# Patient Record
Sex: MALE | Race: BLACK OR AFRICAN AMERICAN | Employment: OTHER | URBAN - METROPOLITAN AREA
[De-identification: names, ages, dates, MRNs, and addresses within clinical notes are randomized per-mention and may not be internally consistent; named-entity substitution may affect disease eponyms.]

---

## 2022-07-08 ENCOUNTER — HOSPITAL ENCOUNTER (OUTPATIENT)
Dept: CT IMAGING | Age: 82
Discharge: HOME | End: 2022-07-08
Payer: COMMERCIAL

## 2022-07-08 DIAGNOSIS — R19.05 ABDOMINAL SWELLING, PERIUMBILICAL REGION: ICD-10-CM

## 2022-07-08 DIAGNOSIS — C34.32 MALIGNANT NEOPLASM OF LOWER LOBE BRONCHUS, LEFT (HCC): ICD-10-CM

## 2022-07-08 PROCEDURE — 71250 CT THORAX DX C-: CPT

## 2022-08-02 ENCOUNTER — APPOINTMENT (OUTPATIENT)
Dept: GENERAL RADIOLOGY | Age: 82
End: 2022-08-02
Payer: MEDICARE

## 2022-08-02 ENCOUNTER — HOSPITAL ENCOUNTER (OUTPATIENT)
Age: 82
Setting detail: OBSERVATION
Discharge: NURSING, REHAB OR LONG-TERM CARE | End: 2022-08-04
Attending: EMERGENCY MEDICINE
Payer: MEDICARE

## 2022-08-02 DIAGNOSIS — R07.9 CHEST PAIN, UNSPECIFIED TYPE: Primary | ICD-10-CM

## 2022-08-02 DIAGNOSIS — R07.2 PRECORDIAL PAIN: ICD-10-CM

## 2022-08-02 LAB
A/G RATIO: 1.2 (ref 1.1–2.2)
ALBUMIN SERPL-MCNC: 4 G/DL (ref 3.4–5)
ALP BLD-CCNC: 132 U/L (ref 40–129)
ALT SERPL-CCNC: 21 U/L (ref 10–40)
ANION GAP SERPL CALCULATED.3IONS-SCNC: 4 MMOL/L (ref 3–16)
AST SERPL-CCNC: 27 U/L (ref 15–37)
BASOPHILS ABSOLUTE: 0.1 K/UL (ref 0–0.2)
BASOPHILS RELATIVE PERCENT: 1.3 %
BILIRUB SERPL-MCNC: 0.7 MG/DL (ref 0–1)
BUN BLDV-MCNC: 12 MG/DL (ref 7–20)
CALCIUM SERPL-MCNC: 9.2 MG/DL (ref 8.3–10.6)
CHLORIDE BLD-SCNC: 101 MMOL/L (ref 99–110)
CHOLESTEROL, TOTAL: 118 MG/DL (ref 0–199)
CO2: 34 MMOL/L (ref 21–32)
CREAT SERPL-MCNC: 1.2 MG/DL (ref 0.8–1.3)
D DIMER: <0.27 UG/ML FEU (ref 0–0.6)
EOSINOPHILS ABSOLUTE: 0.2 K/UL (ref 0–0.6)
EOSINOPHILS RELATIVE PERCENT: 2.2 %
GFR AFRICAN AMERICAN: >60
GFR NON-AFRICAN AMERICAN: 58
GLUCOSE BLD-MCNC: 90 MG/DL (ref 70–99)
HCT VFR BLD CALC: 41.3 % (ref 40.5–52.5)
HDLC SERPL-MCNC: 50 MG/DL (ref 40–60)
HEMOGLOBIN: 14 G/DL (ref 13.5–17.5)
LDL CHOLESTEROL CALCULATED: 57 MG/DL
LYMPHOCYTES ABSOLUTE: 2.8 K/UL (ref 1–5.1)
LYMPHOCYTES RELATIVE PERCENT: 30.5 %
MCH RBC QN AUTO: 31.4 PG (ref 26–34)
MCHC RBC AUTO-ENTMCNC: 33.9 G/DL (ref 31–36)
MCV RBC AUTO: 92.7 FL (ref 80–100)
MONOCYTES ABSOLUTE: 1.1 K/UL (ref 0–1.3)
MONOCYTES RELATIVE PERCENT: 11.8 %
NEUTROPHILS ABSOLUTE: 4.9 K/UL (ref 1.7–7.7)
NEUTROPHILS RELATIVE PERCENT: 54.2 %
PDW BLD-RTO: 12.6 % (ref 12.4–15.4)
PLATELET # BLD: 249 K/UL (ref 135–450)
PMV BLD AUTO: 9.2 FL (ref 5–10.5)
POTASSIUM REFLEX MAGNESIUM: 4.2 MMOL/L (ref 3.5–5.1)
PRO-BNP: 208 PG/ML (ref 0–449)
RBC # BLD: 4.46 M/UL (ref 4.2–5.9)
SODIUM BLD-SCNC: 139 MMOL/L (ref 136–145)
TOTAL PROTEIN: 7.4 G/DL (ref 6.4–8.2)
TRIGL SERPL-MCNC: 55 MG/DL (ref 0–150)
TROPONIN: <0.01 NG/ML
TROPONIN: <0.01 NG/ML
VLDLC SERPL CALC-MCNC: 11 MG/DL
WBC # BLD: 9 K/UL (ref 4–11)

## 2022-08-02 PROCEDURE — 80061 LIPID PANEL: CPT

## 2022-08-02 PROCEDURE — 83880 ASSAY OF NATRIURETIC PEPTIDE: CPT

## 2022-08-02 PROCEDURE — G0378 HOSPITAL OBSERVATION PER HR: HCPCS

## 2022-08-02 PROCEDURE — 80053 COMPREHEN METABOLIC PANEL: CPT

## 2022-08-02 PROCEDURE — 6360000002 HC RX W HCPCS: Performed by: INTERNAL MEDICINE

## 2022-08-02 PROCEDURE — 36415 COLL VENOUS BLD VENIPUNCTURE: CPT

## 2022-08-02 PROCEDURE — 99285 EMERGENCY DEPT VISIT HI MDM: CPT

## 2022-08-02 PROCEDURE — 85025 COMPLETE CBC W/AUTO DIFF WBC: CPT

## 2022-08-02 PROCEDURE — 71045 X-RAY EXAM CHEST 1 VIEW: CPT

## 2022-08-02 PROCEDURE — 96372 THER/PROPH/DIAG INJ SC/IM: CPT

## 2022-08-02 PROCEDURE — 93005 ELECTROCARDIOGRAM TRACING: CPT | Performed by: EMERGENCY MEDICINE

## 2022-08-02 PROCEDURE — 6370000000 HC RX 637 (ALT 250 FOR IP): Performed by: INTERNAL MEDICINE

## 2022-08-02 PROCEDURE — 84484 ASSAY OF TROPONIN QUANT: CPT

## 2022-08-02 PROCEDURE — 85379 FIBRIN DEGRADATION QUANT: CPT

## 2022-08-02 RX ORDER — FAMOTIDINE 20 MG/1
20 TABLET, FILM COATED ORAL NIGHTLY PRN
COMMUNITY

## 2022-08-02 RX ORDER — ASPIRIN 81 MG/1
81 TABLET, CHEWABLE ORAL DAILY
Status: DISCONTINUED | OUTPATIENT
Start: 2022-08-02 | End: 2022-08-04 | Stop reason: HOSPADM

## 2022-08-02 RX ORDER — DONEPEZIL HYDROCHLORIDE 5 MG/1
10 TABLET, FILM COATED ORAL NIGHTLY
Status: DISCONTINUED | OUTPATIENT
Start: 2022-08-02 | End: 2022-08-04 | Stop reason: HOSPADM

## 2022-08-02 RX ORDER — TAMSULOSIN HYDROCHLORIDE 0.4 MG/1
0.4 CAPSULE ORAL DAILY
COMMUNITY

## 2022-08-02 RX ORDER — NITROGLYCERIN 0.4 MG/1
0.4 TABLET SUBLINGUAL EVERY 5 MIN PRN
Status: DISCONTINUED | OUTPATIENT
Start: 2022-08-02 | End: 2022-08-04 | Stop reason: HOSPADM

## 2022-08-02 RX ORDER — FAMOTIDINE 20 MG/1
20 TABLET, FILM COATED ORAL DAILY
Status: DISCONTINUED | OUTPATIENT
Start: 2022-08-02 | End: 2022-08-04 | Stop reason: HOSPADM

## 2022-08-02 RX ORDER — ASPIRIN 81 MG/1
81 TABLET, CHEWABLE ORAL DAILY
COMMUNITY

## 2022-08-02 RX ORDER — ALBUTEROL SULFATE 90 UG/1
2 AEROSOL, METERED RESPIRATORY (INHALATION) EVERY 4 HOURS PRN
Status: DISCONTINUED | OUTPATIENT
Start: 2022-08-02 | End: 2022-08-04 | Stop reason: HOSPADM

## 2022-08-02 RX ORDER — LANOLIN ALCOHOL/MO/W.PET/CERES
5 CREAM (GRAM) TOPICAL NIGHTLY
Status: DISCONTINUED | OUTPATIENT
Start: 2022-08-02 | End: 2022-08-04 | Stop reason: HOSPADM

## 2022-08-02 RX ORDER — ATENOLOL 25 MG/1
TABLET ORAL
COMMUNITY
Start: 2022-06-21

## 2022-08-02 RX ORDER — ATORVASTATIN CALCIUM 40 MG/1
TABLET, FILM COATED ORAL
COMMUNITY
Start: 2022-06-21 | End: 2022-09-04

## 2022-08-02 RX ORDER — ACETAMINOPHEN 325 MG/1
325 TABLET ORAL EVERY 6 HOURS PRN
Status: DISCONTINUED | OUTPATIENT
Start: 2022-08-02 | End: 2022-08-04 | Stop reason: HOSPADM

## 2022-08-02 RX ORDER — ALBUTEROL SULFATE 90 UG/1
2 AEROSOL, METERED RESPIRATORY (INHALATION) EVERY 4 HOURS PRN
COMMUNITY

## 2022-08-02 RX ORDER — ATORVASTATIN CALCIUM 40 MG/1
40 TABLET, FILM COATED ORAL NIGHTLY
Status: DISCONTINUED | OUTPATIENT
Start: 2022-08-02 | End: 2022-08-04 | Stop reason: HOSPADM

## 2022-08-02 RX ORDER — ACETAMINOPHEN 325 MG/1
325 TABLET ORAL EVERY 6 HOURS PRN
COMMUNITY

## 2022-08-02 RX ORDER — ENOXAPARIN SODIUM 100 MG/ML
30 INJECTION SUBCUTANEOUS DAILY
Status: DISCONTINUED | OUTPATIENT
Start: 2022-08-02 | End: 2022-08-04 | Stop reason: HOSPADM

## 2022-08-02 RX ORDER — LISINOPRIL 10 MG/1
TABLET ORAL
COMMUNITY
Start: 2022-06-21

## 2022-08-02 RX ORDER — LORATADINE 10 MG/1
10 TABLET ORAL DAILY
COMMUNITY

## 2022-08-02 RX ORDER — ATENOLOL 25 MG/1
25 TABLET ORAL DAILY
Status: DISCONTINUED | OUTPATIENT
Start: 2022-08-02 | End: 2022-08-04 | Stop reason: HOSPADM

## 2022-08-02 RX ORDER — ONDANSETRON 4 MG/1
4 TABLET, FILM COATED ORAL EVERY 8 HOURS PRN
Status: ON HOLD | COMMUNITY
End: 2022-09-06 | Stop reason: HOSPADM

## 2022-08-02 RX ORDER — CHOLECALCIFEROL (VITAMIN D3) 125 MCG
5 CAPSULE ORAL NIGHTLY
COMMUNITY

## 2022-08-02 RX ORDER — TAMSULOSIN HYDROCHLORIDE 0.4 MG/1
0.4 CAPSULE ORAL DAILY
Status: DISCONTINUED | OUTPATIENT
Start: 2022-08-02 | End: 2022-08-04 | Stop reason: HOSPADM

## 2022-08-02 RX ORDER — LISINOPRIL 10 MG/1
10 TABLET ORAL DAILY
Status: DISCONTINUED | OUTPATIENT
Start: 2022-08-02 | End: 2022-08-04 | Stop reason: HOSPADM

## 2022-08-02 RX ORDER — MORPHINE SULFATE 2 MG/ML
2 INJECTION, SOLUTION INTRAMUSCULAR; INTRAVENOUS EVERY 4 HOURS PRN
Status: DISCONTINUED | OUTPATIENT
Start: 2022-08-02 | End: 2022-08-04 | Stop reason: HOSPADM

## 2022-08-02 RX ORDER — VALACYCLOVIR HYDROCHLORIDE 500 MG/1
1000 TABLET, FILM COATED ORAL 2 TIMES DAILY
Status: DISCONTINUED | OUTPATIENT
Start: 2022-08-02 | End: 2022-08-04 | Stop reason: HOSPADM

## 2022-08-02 RX ORDER — TRAMADOL HYDROCHLORIDE 50 MG/1
50 TABLET ORAL EVERY 6 HOURS PRN
Status: DISCONTINUED | OUTPATIENT
Start: 2022-08-02 | End: 2022-08-04 | Stop reason: HOSPADM

## 2022-08-02 RX ORDER — ONDANSETRON 2 MG/ML
4 INJECTION INTRAMUSCULAR; INTRAVENOUS EVERY 6 HOURS PRN
Status: DISCONTINUED | OUTPATIENT
Start: 2022-08-02 | End: 2022-08-04 | Stop reason: HOSPADM

## 2022-08-02 RX ORDER — ONDANSETRON 4 MG/1
4 TABLET, ORALLY DISINTEGRATING ORAL EVERY 8 HOURS PRN
Status: DISCONTINUED | OUTPATIENT
Start: 2022-08-02 | End: 2022-08-04 | Stop reason: HOSPADM

## 2022-08-02 RX ORDER — DONEPEZIL HYDROCHLORIDE 10 MG/1
TABLET, FILM COATED ORAL
COMMUNITY
Start: 2022-06-21

## 2022-08-02 RX ADMIN — ASPIRIN 81 MG: 81 TABLET, CHEWABLE ORAL at 17:53

## 2022-08-02 RX ADMIN — ATENOLOL 25 MG: 25 TABLET ORAL at 17:53

## 2022-08-02 RX ADMIN — LISINOPRIL 10 MG: 10 TABLET ORAL at 17:53

## 2022-08-02 RX ADMIN — ENOXAPARIN SODIUM 30 MG: 100 INJECTION SUBCUTANEOUS at 17:54

## 2022-08-02 RX ADMIN — TRAMADOL HYDROCHLORIDE 50 MG: 50 TABLET, COATED ORAL at 17:53

## 2022-08-02 RX ADMIN — ATORVASTATIN CALCIUM 40 MG: 40 TABLET, FILM COATED ORAL at 20:30

## 2022-08-02 RX ADMIN — DONEPEZIL HYDROCHLORIDE 10 MG: 5 TABLET, FILM COATED ORAL at 20:30

## 2022-08-02 RX ADMIN — MELATONIN TAB 3 MG 4.5 MG: 3 TAB at 20:30

## 2022-08-02 RX ADMIN — VALACYCLOVIR HYDROCHLORIDE 1000 MG: 500 TABLET, FILM COATED ORAL at 20:30

## 2022-08-02 ASSESSMENT — PAIN SCALES - GENERAL
PAINLEVEL_OUTOF10: 4
PAINLEVEL_OUTOF10: 0

## 2022-08-02 ASSESSMENT — PAIN DESCRIPTION - LOCATION: LOCATION: GROIN

## 2022-08-02 ASSESSMENT — PAIN DESCRIPTION - DESCRIPTORS: DESCRIPTORS: SORE

## 2022-08-02 ASSESSMENT — PAIN DESCRIPTION - ORIENTATION: ORIENTATION: LEFT

## 2022-08-02 ASSESSMENT — HEART SCORE: ECG: 1

## 2022-08-02 NOTE — ED PROVIDER NOTES
[address] PROVIDER NOTE    Patient Identification  Pt Name: Alli Reilly  MRN: [id number]  Abelardogfjessica 1940  Date of evaluation: 8/2/2022  Provider: Ranjit Greene MD  PCP: No primary care provider on file. Chief Complaint  Chest Pain ([facility], c/o intermittent chest pain for the last few months, sometimes radiates to arm, every hour or two)      HPI  (History provided by patient)  This is a 78 y.o. male who was brought in by EMS transportation for intermittent chest pain for several months. He states the pain is usually barely noticeable. However, it was much more severe today, rated 8/10. It is substernal in location with pain radiating to both arms. He describes the pain as sharp. It is not worse with depe breathing or movement. He denies new cough, but has had an occasional cough for some time. He is not having fever or chills. He has occasional associated shortness of breath. He is not having nausea, vomiting, or abdominal pain. He denies modifiers to the pain. ROS  10 systems reviewed, pertinent positives/negatives per HPI otherwise noted to be negative. I have reviewed the following nursing documentation:  Allergies: Patient has no known allergies. Past medical history:   Past Medical History:   Diagnosis Date    Benign prostatic hyperplasia without lower urinary tract symptoms     Carpal tunnel syndrome     Cerebral infarction [facility])     Cognitive communication deficit     Emphysema, unspecified ([facility] [address]     Hyperlipidemia     Hypertension     Muscle weakness     Neuralgia and neuritis     Nicotine dependence     Polyosteoarthritis     Radiculopathy     TIA (transient ischemic attack)     Urge incontinence      Past surgical history: No known past surgical history. Home medications:   Previous Medications    No medications on file       Social history: No history of smoking.     Family history: No family history pertinent    Exam  ED Triage Vitals [08/02/22 0639]   BP Temp Temp Source Heart Rate Resp SpO2 Height Weight   (!) 180/74 98.7 °F (37.1 °C) Oral 60 21 99 % -- --     Nursing note and vitals reviewed. Constitutional: Well developed, well nourished. Non-toxic in appearance. HENT:      Head: Normocephalic and atraumatic. Ears: External ears normal.      Nose: Nose normal.     Mouth: Membrane mucosa moist and pink. Eyes: Anicteric sclera. No discharge. Neck: Supple. Trachea midline. Cardiovascular: RRR; no murmurs, rubs, or gallops. Pulmonary/Chest: Effort normal. No respiratory distress. CTAB. No stridor. No wheezes. No rales. Abdominal: Soft. No distension. Non-tender to palpation. Active bowel sounds. Musculoskeletal: Moves all extremities. No gross deformity. Neurological: Alert and oriented. Face symmetric. Speech is clear. Skin: Warm and dry. No rash. Psychiatric: Normal mood and affect. Behavior is normal.    EKG  The Ekg interpreted by me in the absence of a cardiologist shows. normal sinus rhythm with a rate of 61  Axis is   Normal  QTc is  within an acceptable range  Intervals and Durations are unremarkable. No specific ST-T wave changes appreciated. No evidence of acute ischemia. No previous EKG is available for comparison. Radiology  XR CHEST PORTABLE   Final Result   1. Patchy right basilar airspace disease either due to atelectasis or   developing pneumonia.              Labs  Results for orders placed or performed during the hospital encounter of 08/02/22   CBC with Auto Differential   Result Value Ref Range    WBC 9.0 4.0 - 11.0 K/uL    RBC 4.46 4.20 - 5.90 M/uL    Hemoglobin 14.0 13.5 - 17.5 g/dL    Hematocrit 41.3 40.5 - 52.5 %    MCV 92.7 80.0 - 100.0 fL    MCH 31.4 26.0 - 34.0 pg    MCHC 33.9 31.0 - 36.0 g/dL    RDW 12.6 12.4 - 15.4 %    Platelets 942 668 - 876 K/uL    MPV 9.2 5.0 - 10.5 fL    Neutrophils % 54.2 %    Lymphocytes % 30.5 %    Monocytes % 11.8 % Eosinophils % 2.2 %    Basophils % 1.3 %    Neutrophils Absolute 4.9 1.7 - 7.7 K/uL    Lymphocytes Absolute 2.8 1.0 - 5.1 K/uL    Monocytes Absolute 1.1 0.0 - 1.3 K/uL    Eosinophils Absolute 0.2 0.0 - 0.6 K/uL    Basophils Absolute 0.1 0.0 - 0.2 K/uL   Comprehensive Metabolic Panel w/ Reflex to MG   Result Value Ref Range    Sodium 139 136 - 145 mmol/L    Potassium reflex Magnesium 4.2 3.5 - 5.1 mmol/L    Chloride 101 99 - 110 mmol/L    CO2 34 (H) 21 - 32 mmol/L    Anion Gap 4 3 - 16    Glucose 90 70 - 99 mg/dL    BUN 12 7 - 20 mg/dL    Creatinine 1.2 0.8 - 1.3 mg/dL    GFR Non-African American 58 (A) >60    GFR African American >60 >60    Calcium 9.2 8.3 - 10.6 mg/dL    Total Protein 7.4 6.4 - 8.2 g/dL    Albumin 4.0 3.4 - 5.0 g/dL    Albumin/Globulin Ratio 1.2 1.1 - 2.2    Total Bilirubin 0.7 0.0 - 1.0 mg/dL    Alkaline Phosphatase 132 (H) 40 - 129 U/L    ALT 21 10 - 40 U/L    AST 27 15 - 37 U/L   D-Dimer, Quantitative   Result Value Ref Range    D-Dimer, Quant <0.27 0.00 - 0.60 ug/mL FEU   Brain Natriuretic Peptide   Result Value Ref Range    Pro- 0 - 449 pg/mL   Troponin   Result Value Ref Range    Troponin <0.01 <0.01 ng/mL     SEP-1  Is this patient to be included in the SEP-1 Core Measure due to severe sepsis or septic shock? No   Exclusion criteria - the patient is NOT to be included for SEP-1 Core Measure due to: Infection is not suspected     Screenings  Heart Score for chest pain patients  History: Moderately Suspicious  ECG: Non-Specifc repolarization disturbance/LBTB/PM  Patient Age: > 65 years  *Risk factors for Atherosclerotic disease: Hypercholesterolemia, Hypertension  Risk Factors: 1 or 2 risk factors  Troponin: < 1X normal limit  Heart Score Total: 5     MDM and ED Course  Patient presented to the emergency department from [facility] with chest pain. He has had intermittent episodes of chest pain for the last several months. They have generally been mild.  However, today's episode was much more severe and persistent. There is localized to the substernal chest with radiation down his arms. Initial troponin is normal. EKG shows no evidence of acute ischemia. Reviewing his records, he has never previously had a stress test or cardiac catheterization. He has multiple risk factors, including hyperlipidemia andhypertension. His heart score is 5. His D-dimer was negative, indicating very low risk of PE in this otherwise low risk patient. I consulted Dr. Elissa Arenas through [facility] for admission. He reviewed the patient's history, physical exam, labs, imaging studies, and emergency department course and has decided to admit Jessica Jon for further evaluation and treatment. As I have deemed necessary from their history, physical, and studies, I have considered and evaluated Jessica Jon for the following diagnoses:  PULMONARY EMBOLISM, ACUTE CORONARY SYNDROME, CARDIAC TAMPONADE, PNEUMOTHORAX, PNEUMONIA, PERICARDITIS, AORTIC DISSECTION/ANEURYSM, HEMOTHORAX    Final Impression  1. Chest pain, unspecified type        Blood pressure (!) 180/74, pulse 60, temperature 98.7 °F (37.1 °C), temperature source Oral, resp. rate 21, SpO2 99 %. Disposition:  DISPOSITION Decision To Admit 08/02/2022 09:27:30 AM    This chart was generated using the [address] [place] [address] dictation system. I created this record but it may contain dictation errors given the limitations of this technology.        Frankie Jeffery MD  08/10/22 1923

## 2022-08-02 NOTE — CARE COORDINATION
08/02/22 1415   Social/Functional History   Type of Home Facility  (Patient is LTC at Home at [facility]. Confirmed with Marlen in admissions.)   Services At/After Discharge   Mode of Transport at Discharge BLS   Condition of Participation: Discharge Planning   The Plan for Transition of Care is related to the following treatment goals: Plan is to return to Home at [facility] [address] at discharge.      Electronically signed by JEFF Overton, JOSE L on 8/2/2022 at 2:21 PM

## 2022-08-03 LAB
EKG ATRIAL RATE: 61 BPM
EKG DIAGNOSIS: NORMAL
EKG P AXIS: 81 DEGREES
EKG P-R INTERVAL: 160 MS
EKG Q-T INTERVAL: 470 MS
EKG QRS DURATION: 126 MS
EKG QTC CALCULATION (BAZETT): 473 MS
EKG R AXIS: 32 DEGREES
EKG T AXIS: 22 DEGREES
EKG VENTRICULAR RATE: 61 BPM
LV EF: 67 %
LVEF MODALITY: NORMAL

## 2022-08-03 PROCEDURE — 3430000000 HC RX DIAGNOSTIC RADIOPHARMACEUTICAL: Performed by: INTERNAL MEDICINE

## 2022-08-03 PROCEDURE — A9502 TC99M TETROFOSMIN: HCPCS | Performed by: INTERNAL MEDICINE

## 2022-08-03 PROCEDURE — 6370000000 HC RX 637 (ALT 250 FOR IP): Performed by: INTERNAL MEDICINE

## 2022-08-03 PROCEDURE — 78452 HT MUSCLE IMAGE SPECT MULT: CPT | Performed by: INTERNAL MEDICINE

## 2022-08-03 PROCEDURE — 6360000002 HC RX W HCPCS: Performed by: INTERNAL MEDICINE

## 2022-08-03 PROCEDURE — G0378 HOSPITAL OBSERVATION PER HR: HCPCS

## 2022-08-03 PROCEDURE — 93017 CV STRESS TEST TRACING ONLY: CPT | Performed by: INTERNAL MEDICINE

## 2022-08-03 PROCEDURE — 94760 N-INVAS EAR/PLS OXIMETRY 1: CPT

## 2022-08-03 PROCEDURE — 93010 ELECTROCARDIOGRAM REPORT: CPT | Performed by: INTERNAL MEDICINE

## 2022-08-03 RX ORDER — HALOPERIDOL 5 MG/ML
2 INJECTION INTRAMUSCULAR EVERY 6 HOURS PRN
Status: DISCONTINUED | OUTPATIENT
Start: 2022-08-03 | End: 2022-08-04 | Stop reason: HOSPADM

## 2022-08-03 RX ADMIN — ATENOLOL 25 MG: 25 TABLET ORAL at 08:06

## 2022-08-03 RX ADMIN — ASPIRIN 81 MG: 81 TABLET, CHEWABLE ORAL at 08:06

## 2022-08-03 RX ADMIN — VALACYCLOVIR HYDROCHLORIDE 1000 MG: 500 TABLET, FILM COATED ORAL at 12:20

## 2022-08-03 RX ADMIN — TETROFOSMIN 10 MILLICURIE: 1.38 INJECTION, POWDER, LYOPHILIZED, FOR SOLUTION INTRAVENOUS at 09:04

## 2022-08-03 RX ADMIN — VALACYCLOVIR HYDROCHLORIDE 1000 MG: 500 TABLET, FILM COATED ORAL at 20:05

## 2022-08-03 RX ADMIN — DONEPEZIL HYDROCHLORIDE 10 MG: 5 TABLET, FILM COATED ORAL at 20:05

## 2022-08-03 RX ADMIN — REGADENOSON 0.4 MG: 0.08 INJECTION, SOLUTION INTRAVENOUS at 10:41

## 2022-08-03 RX ADMIN — LISINOPRIL 10 MG: 10 TABLET ORAL at 08:06

## 2022-08-03 RX ADMIN — FAMOTIDINE 20 MG: 20 TABLET, FILM COATED ORAL at 08:06

## 2022-08-03 RX ADMIN — MELATONIN TAB 3 MG 4.5 MG: 3 TAB at 20:05

## 2022-08-03 RX ADMIN — TAMSULOSIN HYDROCHLORIDE 0.4 MG: 0.4 CAPSULE ORAL at 08:06

## 2022-08-03 RX ADMIN — ATORVASTATIN CALCIUM 40 MG: 40 TABLET, FILM COATED ORAL at 20:05

## 2022-08-03 RX ADMIN — TETROFOSMIN 30 MILLICURIE: 1.38 INJECTION, POWDER, LYOPHILIZED, FOR SOLUTION INTRAVENOUS at 10:37

## 2022-08-03 ASSESSMENT — PAIN DESCRIPTION - LOCATION: LOCATION: LEG

## 2022-08-03 ASSESSMENT — PAIN DESCRIPTION - ORIENTATION: ORIENTATION: LEFT

## 2022-08-03 ASSESSMENT — PAIN SCALES - GENERAL
PAINLEVEL_OUTOF10: 0
PAINLEVEL_OUTOF10: 4
PAINLEVEL_OUTOF10: 0
PAINLEVEL_OUTOF10: 0

## 2022-08-03 ASSESSMENT — PAIN DESCRIPTION - DESCRIPTORS: DESCRIPTORS: BURNING

## 2022-08-03 NOTE — PLAN OF CARE
Problem: Cardiovascular - Adult  Goal: Maintains optimal cardiac output and hemodynamic stability  8/2/2022 2304 by Molly William RN  Outcome: Progressing     Problem: Cardiovascular - Adult  Goal: Absence of cardiac dysrhythmias or at baseline  8/2/2022 2304 by Molly William RN  Outcome: Progressing     Problem: Skin/Tissue Integrity - Adult  Goal: Skin integrity remains intact  8/2/2022 2304 by Molly William RN  Outcome: Progressing     Problem: Pain  Goal: Verbalizes/displays adequate comfort level or baseline comfort level  8/2/2022 2304 by Molly William RN  Outcome: Progressing     Problem: ABCDS Injury Assessment  Goal: Absence of physical injury  8/2/2022 2304 by Molly William RN  Outcome: Progressing

## 2022-08-04 VITALS
DIASTOLIC BLOOD PRESSURE: 66 MMHG | BODY MASS INDEX: 23.51 KG/M2 | HEIGHT: 69 IN | SYSTOLIC BLOOD PRESSURE: 152 MMHG | HEART RATE: 64 BPM | OXYGEN SATURATION: 95 % | WEIGHT: 158.73 LBS | RESPIRATION RATE: 16 BRPM | TEMPERATURE: 98.2 F

## 2022-08-04 PROBLEM — I10 PRIMARY HYPERTENSION: Status: ACTIVE | Noted: 2022-08-04

## 2022-08-04 PROBLEM — F03.90 DEMENTIA (HCC): Status: ACTIVE | Noted: 2022-08-04

## 2022-08-04 PROBLEM — I20.0 UNSTABLE ANGINA (HCC): Status: ACTIVE | Noted: 2022-08-04

## 2022-08-04 LAB
ANION GAP SERPL CALCULATED.3IONS-SCNC: 7 MMOL/L (ref 3–16)
BASOPHILS ABSOLUTE: 0.1 K/UL (ref 0–0.2)
BASOPHILS RELATIVE PERCENT: 0.8 %
BUN BLDV-MCNC: 17 MG/DL (ref 7–20)
CALCIUM SERPL-MCNC: 9.4 MG/DL (ref 8.3–10.6)
CHLORIDE BLD-SCNC: 103 MMOL/L (ref 99–110)
CO2: 28 MMOL/L (ref 21–32)
CREAT SERPL-MCNC: 1 MG/DL (ref 0.8–1.3)
EOSINOPHILS ABSOLUTE: 0.2 K/UL (ref 0–0.6)
EOSINOPHILS RELATIVE PERCENT: 1.8 %
GFR AFRICAN AMERICAN: >60
GFR NON-AFRICAN AMERICAN: >60
GLUCOSE BLD-MCNC: 99 MG/DL (ref 70–99)
HCT VFR BLD CALC: 41.6 % (ref 40.5–52.5)
HEMOGLOBIN: 14 G/DL (ref 13.5–17.5)
LYMPHOCYTES ABSOLUTE: 3.3 K/UL (ref 1–5.1)
LYMPHOCYTES RELATIVE PERCENT: 31.2 %
MCH RBC QN AUTO: 31.4 PG (ref 26–34)
MCHC RBC AUTO-ENTMCNC: 33.7 G/DL (ref 31–36)
MCV RBC AUTO: 93.2 FL (ref 80–100)
MONOCYTES ABSOLUTE: 0.9 K/UL (ref 0–1.3)
MONOCYTES RELATIVE PERCENT: 8.7 %
NEUTROPHILS ABSOLUTE: 6.1 K/UL (ref 1.7–7.7)
NEUTROPHILS RELATIVE PERCENT: 57.5 %
PDW BLD-RTO: 12.7 % (ref 12.4–15.4)
PLATELET # BLD: 251 K/UL (ref 135–450)
PMV BLD AUTO: 9.3 FL (ref 5–10.5)
POTASSIUM SERPL-SCNC: 4.9 MMOL/L (ref 3.5–5.1)
RBC # BLD: 4.47 M/UL (ref 4.2–5.9)
SARS-COV-2, NAAT: NOT DETECTED
SODIUM BLD-SCNC: 138 MMOL/L (ref 136–145)
WBC # BLD: 10.6 K/UL (ref 4–11)

## 2022-08-04 PROCEDURE — G0378 HOSPITAL OBSERVATION PER HR: HCPCS

## 2022-08-04 PROCEDURE — 6370000000 HC RX 637 (ALT 250 FOR IP): Performed by: INTERNAL MEDICINE

## 2022-08-04 PROCEDURE — 80048 BASIC METABOLIC PNL TOTAL CA: CPT

## 2022-08-04 PROCEDURE — 36415 COLL VENOUS BLD VENIPUNCTURE: CPT

## 2022-08-04 PROCEDURE — 87635 SARS-COV-2 COVID-19 AMP PRB: CPT

## 2022-08-04 PROCEDURE — 85025 COMPLETE CBC W/AUTO DIFF WBC: CPT

## 2022-08-04 PROCEDURE — 99223 1ST HOSP IP/OBS HIGH 75: CPT | Performed by: INTERNAL MEDICINE

## 2022-08-04 RX ORDER — TRAMADOL HYDROCHLORIDE 50 MG/1
50 TABLET ORAL EVERY 6 HOURS PRN
Qty: 12 TABLET | Refills: 0 | Status: SHIPPED | OUTPATIENT
Start: 2022-08-04 | End: 2022-08-07

## 2022-08-04 RX ORDER — NITROGLYCERIN 0.4 MG/1
TABLET SUBLINGUAL
Qty: 25 TABLET | Refills: 3 | Status: SHIPPED | OUTPATIENT
Start: 2022-08-04

## 2022-08-04 RX ORDER — VALACYCLOVIR HYDROCHLORIDE 1 G/1
1000 TABLET, FILM COATED ORAL 2 TIMES DAILY
Qty: 10 TABLET | Refills: 0 | Status: SHIPPED | OUTPATIENT
Start: 2022-08-04 | End: 2022-08-09

## 2022-08-04 RX ADMIN — ATENOLOL 25 MG: 25 TABLET ORAL at 08:08

## 2022-08-04 RX ADMIN — ASPIRIN 81 MG: 81 TABLET, CHEWABLE ORAL at 08:08

## 2022-08-04 RX ADMIN — VALACYCLOVIR HYDROCHLORIDE 1000 MG: 500 TABLET, FILM COATED ORAL at 08:11

## 2022-08-04 RX ADMIN — FAMOTIDINE 20 MG: 20 TABLET, FILM COATED ORAL at 08:08

## 2022-08-04 RX ADMIN — TAMSULOSIN HYDROCHLORIDE 0.4 MG: 0.4 CAPSULE ORAL at 08:08

## 2022-08-04 RX ADMIN — LISINOPRIL 10 MG: 10 TABLET ORAL at 08:08

## 2022-08-04 ASSESSMENT — PAIN SCALES - GENERAL
PAINLEVEL_OUTOF10: 0

## 2022-08-04 NOTE — PLAN OF CARE
Problem: Discharge Planning  Goal: Discharge to home or other facility with appropriate resources  8/4/2022 1704 by Jasen Sexton RN  Outcome: Completed  8/4/2022 1110 by Jasen Sexton RN  Outcome: Progressing     Problem: Cardiovascular - Adult  Goal: Maintains optimal cardiac output and hemodynamic stability  8/4/2022 1704 by Jasen Sexton RN  Outcome: Completed  8/4/2022 1110 by Jasen Sexton RN  Outcome: Progressing  Goal: Absence of cardiac dysrhythmias or at baseline  8/4/2022 1704 by Jasen Sexton RN  Outcome: Completed  8/4/2022 1110 by Jasen Sexton RN  Outcome: Progressing     Problem: Skin/Tissue Integrity - Adult  Goal: Skin integrity remains intact  8/4/2022 1704 by Jasen Sexton RN  Outcome: Completed  8/4/2022 1110 by Jasen Sexton RN  Outcome: Progressing  Goal: Incisions, wounds, or drain sites healing without S/S of infection  8/4/2022 1704 by Jasen Sexton RN  Outcome: Completed  8/4/2022 1110 by Jasen Sexton RN  Outcome: Progressing  Goal: Oral mucous membranes remain intact  8/4/2022 1704 by Jasen Sexton RN  Outcome: Completed  8/4/2022 1110 by Jasen Sexton RN  Outcome: Progressing     Problem: Pain  Goal: Verbalizes/displays adequate comfort level or baseline comfort level  8/4/2022 1704 by Jasen Sexton RN  Outcome: Completed  8/4/2022 1110 by Jasen Sexton RN  Outcome: Progressing     Problem: ABCDS Injury Assessment  Goal: Absence of physical injury  8/4/2022 1704 by Jasen Sexton RN  Outcome: Completed  8/4/2022 1110 by Jasen Sexton RN  Outcome: Progressing     Problem: Safety - Adult  Goal: Free from fall injury  8/4/2022 1704 by Jasen Sexton RN  Outcome: Completed  8/4/2022 1110 by Jasne Sexton RN  Outcome: Progressing     Problem: Skin/Tissue Integrity  Goal: Absence of new skin breakdown  Description: 1. Monitor for areas of redness and/or skin breakdown  2. Assess vascular access sites hourly  3. Every 4-6 hours minimum:  Change oxygen saturation probe site  4.   Every 4-6 hours:  If on nasal continuous positive airway pressure, respiratory therapy assess nares and determine need for appliance change or resting period.   8/4/2022 1704 by Mckayla Reynaga RN  Outcome: Completed  8/4/2022 1110 by Mckayla Reynaga RN  Outcome: Progressing

## 2022-08-04 NOTE — PLAN OF CARE
Problem: Skin/Tissue Integrity - Adult  Goal: Skin integrity remains intact  Outcome: Progressing  Goal: Incisions, wounds, or drain sites healing without S/S of infection  Outcome: Progressing  Goal: Oral mucous membranes remain intact  Outcome: Progressing     Problem: Pain  Goal: Verbalizes/displays adequate comfort level or baseline comfort level  Outcome: Progressing     Problem: ABCDS Injury Assessment  Goal: Absence of physical injury  Outcome: Progressing

## 2022-08-04 NOTE — DISCHARGE INSTR - COC
Continuity of Care Form    Patient Name: Jerson Preciado   :  1940  MRN:  [id number]    Admit date:  2022  Discharge date:  2022    Code Status Order: Full Code   Advance Directives:     Admitting Physician:  Inga Stanley MD  PCP: No primary care provider on file.     Discharging Nurse: [facility] Unit/Room#: 3AN-3301/3301-01  Discharging Unit Phone Number: [phone]    Emergency Contact:   Extended Emergency Contact Information  Primary Emergency Contact: [address] Phone: [phone]  Relation: Child  Secondary Emergency Contact: [address] Phone: [phone]  Relation: Child    Past Surgical History:  Past Surgical History:   Procedure Laterality Date    LUNG REMOVAL, PARTIAL Right     noted on CT scan    TONSILLECTOMY         Immunization History:   Immunization History   Administered Date(s) Administered    COVID-19, PFIZER PURPLE top, DILUTE for use, (age 15 y+), 30mcg/0.3mL 2021, 2021, 2021       Active Problems:  Patient Active Problem List   Diagnosis Code    Chest pain R07.9    Unstable angina ([facility] [address] I20.0    Primary hypertension I10    Dementia ([facility] [address] F03.90       Isolation/Infection:   Isolation            Contact          Patient Infection Status       None to display            Nurse Assessment:  Last Vital Signs: /67   Pulse 58   Temp 98 °F (36.7 °C) (Oral)   Resp 16   Ht 5' 9\" (1.753 m)   Wt 158 lb 11.7 oz (72 kg)   SpO2 96%   BMI 23.44 kg/m²     Last documented pain score (0-10 scale): Pain Level: 0  Last Weight:   Wt Readings from Last 1 Encounters:   22 158 lb 11.7 oz (72 kg)     Mental Status:  disoriented and alert    IV Access:  - None    Nursing Mobility/ADLs:  Walking   Dependent  Transfer  Dependent  Bathing  Dependent  Dressing  Dependent  Toileting  Dependent  Feeding  Dependent  Med Admin  Assisted  Med Delivery   whole    Wound Care Documentation and Therapy: Elimination:  Continence: Bowel: No  Bladder: No  Urinary Catheter: None   Colostomy/Ileostomy/Ileal Conduit: No       Date of Last BM: 8/3/22    Intake/Output Summary (Last 24 hours) at 8/4/2022 1204  Last data filed at 8/4/2022 0616  Gross per 24 hour   Intake 240 ml   Output 800 ml   Net -560 ml     I/O last 3 completed shifts: In: 5 [P.O.:720]  Out: 1100 [Urine:1100]    Safety Concerns: At Risk for Falls and wheelchair bound     Impairments/Disabilities:      Wheelchair     Nutrition Therapy:  Current Nutrition Therapy:   - Oral Diet:  General    Routes of Feeding: Oral  Liquids: No Restrictions  Daily Fluid Restriction: no  Last Modified Barium Swallow with Video (Video Swallowing Test): not done    Treatments at the Time of Hospital Discharge:   Respiratory Treatments: none  Oxygen Therapy:  is not on home oxygen therapy. Ventilator:    - No ventilator support    Rehab Therapies: Physical Therapy and Occupational Therapy  Weight Bearing Status/Restrictions: No weight bearing restrictions  Other Medical Equipment (for information only, NOT a DME order):  wheelchair  Other Treatments: none    Patient's personal belongings (please select all that are sent with patient):  Marcell POWERS SIGNATURE:  Electronically signed by Jean Louis RN on 8/4/22 at 12:43 PM EDT    CASE MANAGEMENT/SOCIAL WORK SECTION    Inpatient Status Date: ***    Readmission Risk Assessment Score:  Readmission Risk              Risk of Unplanned Readmission:  0           Discharging to Facility/ Agency   Name:  Home at [facility].   Gilmer, Jeff Piedad  Report: [phone]  Fax: [phone]   Address:  Phone:  Fax:    Dialysis Facility (if applicable)   Name:  Address:  Dialysis Schedule:  Phone:  Fax:    / signature: Electronically signed by Joellen Izaguirre RN on 8/4/22 at 4:04 PM EDT    PHYSICIAN SECTION    Prognosis: Guarded    Condition at Discharge: Stable    Rehab Potential (if transferring to Rehab): Fair    Recommended Labs or Other Treatments After Discharge: Patient must follow up with Cardiology as out patient for positive stress test  for coronary studies     Physician Certification: I certify the above information and transfer of Jody Pretty  is necessary for the continuing treatment of the diagnosis listed and that he requires Intermediate Nursing Care for greater 30 days.      Update Admission H&P: No change in H&P    PHYSICIAN SIGNATURE:  Electronically signed by Shelley Mclean MD on 8/4/22 at 12:05 PM EDT

## 2022-08-04 NOTE — CARE COORDINATION
Discharge Plan:   Patient discharged to: Home at [facility]. [facility], [address] [facility]  Report: [phone]  Fax: [phone]     SW/DC Planner faxed, CHE and AVS   Narcotic Prescriptions faxed were: No  RN: Tyrell Beck will call report       Medical Transport with: [employer]  [phone]   time: 65  Family advised of discharge?:- son- Spurgeon Dubin?:   N/A    All discharge needs met per case management.

## 2022-08-04 NOTE — H&P
[facility]                     [facility], [address]                              HISTORY AND PHYSICAL    PATIENT NAME: Chace Berman                   :        1940  MED REC NO:   [id number]                          ROOM:       1413  ACCOUNT NO:   [de-identified]                           ADMIT DATE: 2022  PROVIDER:     Ramirez Israel MD    HISTORY OF PRESENT ILLNESS:  The patient is an 78-year-old black  American gentleman, who came to the emergency room from [facility] and Home at [facility] with an intermittent chest pain  for the last few months. Sometimes, it radiates to the arm. It is  definitely worse with activity. There is no nausea or vomiting. No  diaphoresis. No loss of consciousness. The patient is a poor  historian. He does appear to have some memory and orientation loss. PAST MEDICAL HISTORY:  Pertinent for cerebral infarction,  osteoarthritis, emphysema, benign prostatic hyperplasia, nicotine  dependence, hypertension, generalized muscle weakness, hyperlipidemia,  radiculopathy, TIA, carpal tunnel syndrome, peripheral neuropathy and  neuralgia, cognitive-communication deficit, urge incontinence, COVID-19  disease, acute prostatitis. PAST SURGICAL HISTORY:  Pertinent for tonsillectomy, partial  pneumonectomy. MEDICATIONS:  The patient is on Tenormin, Lipitor, Aricept, Lovenox,  Pepcid, lisinopril, melatonin, Flomax, and Valtrex. ALLERGIES:  No known allergies. SOCIAL HISTORY:  The patient is a  man. He had four children,  now there are three living. He quit smoking 40 years ago. He would  have an occasional alcoholic beverage. He used to work in a foundry. He has also worked as a . There is no history  of substance abuse. He is presently institutionalized at Home at  [facility]. There is no history of substance abuse.     FAMILY HISTORY:  Both his parents are  because of natural  causes. No further history available. REVIEW OF SYSTEM:  Negative for loss of consciousness. No diaphoresis. No visual blurring. No convulsions. No dysphagia. Nutritionally  adequate with a body mass index of 23.44. Does have symptoms consistent  with exertional angina. There is no orthopnea. No paroxysmal nocturnal  dyspnea. No abdominal pain. No hematemesis. No melena. No  genitourinary complaint except some dysuria. No hematuria. Does have  chronic musculoskeletal pain and polyarthralgia. PHYSICAL EXAMINATION:  GENERAL:  He is alert, awake, and oriented x1, moderately distressed,  confused 78year-old pleasant and cooperative man. VITAL SIGNS:  His temperature is 97.7, blood pressure 138/72,  respirations 16, heart rate 59. O2 sat 97% on room air. HEENT:  Oral mucosa dry. SKIN:  Warm and dry. NECK:  Supple. Faint carotid bruit. Mild jugular venous distention. No lymphadenopathy. No thyromegaly. CHEST:  Lungs with vesicular breath sound. No chest wall tenderness. There is no wheezing. HEART:  Regular rate and rhythm. S1 and S2, 2/6 holosystolic murmur. No gallop rhythm. ABDOMEN:  Soft, nontender. Bowel sounds present. EXTREMITIES:  Show no cyanosis or edema. Distal pulsations are weak. NEUROLOGIC:  The patient appears grossly intact. Generalized  neuromuscular weakness, poor coordination, unable to participate in a  detailed sensorimotor evaluation. Babinski's is equivocal on the left  side, present on the right side. LABORATORY DATA:  Lab evaluation shows sodium 139, potassium 4.2,  chloride 101, CO2 of 34, BUN 12, creatinine 1.2, anion gap is 4, GFR is  58. ProBNP level 208. While I am dictating this, at least two sets of  troponin have been negative. Bilirubin is 0.7. White blood cell count  9.0, hemoglobin/hematocrit 14 and 41.3, platelet count 517.     DIAGNOSTIC DATA:  Chest x-ray, portable, patchy right basilar airspace  disease due to atelectasis or developing pneumonia. While I am  dictating this, even nuclear medicine myocardial perfusion stress test  has been done. The patient developed symptoms likely related to  Power Pilling. There were stress-induced shortness of breath, dizziness, and  cramps. Symptoms resolved with aminophylline. No significant rhythm  abnormality. Myocardial perfusion test was abnormal with reversible  ischemia, moderate _____, low moderate risk. ASSESSMENT:  Unstable angina, atherosclerotic heart disease, dementia,  hypertension. PLAN:  The patient has been admitted. Put on chest pain pathway. Cardiology consultation. The patient may benefit from invasive coronary  valve. The patient is a full code.         Iker Marcano MD    D: 08/04/2022 0:35:15       T: 08/04/2022 0:38:28     [id number]  Job#: [id number]     Doc#: [id number]    CC:

## 2022-08-04 NOTE — CONSULTS
Cardiovascular Consultation     Attending Physician: Corrine Altamirano MD    PATIENT: Aurora Cardona  : 1940  MRN: [id number]    Reason for Consultation:   Chief Complaint   Patient presents with    Chest Pain     Pt via [facility] from [facility], c/o intermittent chest pain for the last few months, sometimes radiates to arm, every hour or two     History of present illness:   Mr. Aurora Cardona is a 78 y.o. male patient, who resides at [facility], was transferred by [facility]  with concerns of chest pain. Heena Phillips ruled out for acute coronary syndrome and completed nuclear stress testing. Consultation is to discuss results. Patient on arrival was found to be in contact isolation for concern of shingles. He has a history of dementia, CVA, hypertension, hyperlipidemia. He answers questions appropriately and states that chest pain started several months ago. He describes it as \"spasmodic\", being intermittent without pattern in his chest and at times radiating to his arm. No shortness of breath lightheadedness diaphoresis or vomiting. He is a fair historian but continues to state that it feels like spasm. Denies pain today.     Medical History:      Diagnosis Date    Acute prostatitis 2021    Benign prostatic hyperplasia without lower urinary tract symptoms     Carpal tunnel syndrome     Cerebral infarction [facility])     Cognitive communication deficit     COVID-19 2021    Emphysema, unspecified (HCC)     Hyperlipidemia     Hypertension     Muscle weakness     Neuralgia and neuritis     Nicotine dependence     Polyosteoarthritis     Radiculopathy     TIA (transient ischemic attack)     Urge incontinence        Surgical History:      Procedure Laterality Date    LUNG REMOVAL, PARTIAL Right     noted on CT scan    TONSILLECTOMY         Social History:  Social History     Socioeconomic History    Marital status:      Spouse name: Emeterio Monzon Number of children: 4    Years of education: 18    Highest education level: Not on file   Occupational History    Not on file   Tobacco Use    Smoking status: Former     Packs/day: 0.25     Years: 3.00     Pack years: 0.75     Types: Cigarettes     Quit date: 1987     Years since quittin.5    Smokeless tobacco: Never   Vaping Use    Vaping Use: Never used   Substance and Sexual Activity    Alcohol use: Never    Drug use: Never    Sexual activity: Not Currently     Partners: Female   Other Topics Concern    Not on file   Social History Narrative    Not on file     Social Determinants of Health     Financial Resource Strain: Not on file   Food Insecurity: Not on file   Transportation Needs: Not on file   Physical Activity: Not on file   Stress: Not on file   Social Connections: Not on file   Intimate Partner Violence: Not on file   Housing Stability: Not on file        Family History:  No evidence for sudden cardiac death or premature CAD.       Problem Relation Age of Onset    No Known Problems Mother     No Known Problems Father        Medications:  haloperidol lactate (HALDOL) injection 2 mg, Q6H PRN  nitroGLYCERIN (NITROSTAT) SL tablet 0.4 mg, Q5 Min PRN  morphine (PF) injection 2 mg, Q4H PRN  ondansetron (ZOFRAN) injection 4 mg, Q6H PRN  enoxaparin Sodium (LOVENOX) injection 30 mg, Daily  acetaminophen (TYLENOL) tablet 325 mg, Q6H PRN  albuterol sulfate HFA (PROVENTIL;VENTOLIN;PROAIR) 108 (90 Base) MCG/ACT inhaler 2 puff, Q4H PRN  aspirin chewable tablet 81 mg, Daily  atenolol (TENORMIN) tablet 25 mg, Daily  atorvastatin (LIPITOR) tablet 40 mg, Nightly  donepezil (ARICEPT) tablet 10 mg, Nightly  famotidine (PEPCID) tablet 20 mg, Daily  lisinopril (PRINIVIL;ZESTRIL) tablet 10 mg, Daily  melatonin tablet 4.5 mg, Nightly  ondansetron (ZOFRAN-ODT) disintegrating tablet 4 mg, Q8H PRN  tamsulosin (FLOMAX) capsule 0.4 mg, Daily  traMADol (ULTRAM) tablet 50 mg, Q6H PRN  valACYclovir (VALTREX) tablet 1,000 mg, BID      Allergies:  Patient has no known allergies.      Review of Systems:   [x]Full ROS obtained and negative except as mentioned in HPI    Physical Examination:    /67   Pulse 58   Temp 98 °F (36.7 °C) (Oral)   Resp 16   Ht 5' 9\" (1.753 m)   Wt 158 lb 11.7 oz (72 kg)   SpO2 96%   BMI 23.44 kg/m²   Wt Readings from Last 3 Encounters:   22 158 lb 11.7 oz (72 kg)       GENERAL: Well developed, well nourished, no acute distress  NEUROLOGICAL: Alert    PSYCH: Normal mood and affect   SKIN: Warm and dry, without lesions  HEENT: Normocephalic, atraumatic, Sclera non-icteric, mucous membranes moist  NECK: supple, JVP normal, thyroid not enlarged   CAROTID: Normal upstroke, no bruits  CARDIAC: Normal PMI, regular rate and rhythm, normal S1S2, no murmur, rub  RESPIRATORY: Normal respiratory effort, clear to auscultation bilaterally  EXTREMITIES: No cyanosis, clubbing or edema, palpable pulses bilaterally   MUSCULOSKELETAL: No joint swelling or tenderness, no chest wall tenderness  GASTROINTESTINAL:  soft, non-tender, no bruit    Labs:  Lab Review   Lab Results   Component Value Date/Time     2022 08:13 AM    K 4.9 2022 08:13 AM    K 4.2 2022 07:13 AM     2022 08:13 AM    CO2 28 2022 08:13 AM    BUN 17 2022 08:13 AM    CREATININE 1.0 2022 08:13 AM    GLUCOSE 99 2022 08:13 AM    CALCIUM 9.4 2022 08:13 AM     Lab Results   Component Value Date/Time    TROPONINI <0.01 2022 06:28 PM     Lab Results   Component Value Date/Time    WBC 10.6 2022 08:13 AM    HGB 14.0 2022 08:13 AM    HCT 41.6 2022 08:13 AM    MCV 93.2 2022 08:13 AM     2022 08:13 AM     Lab Results   Component Value Date/Time    CHOL 118 2022 07:13 AM    TRIG 55 2022 07:13 AM    HDL 50 2022 07:13 AM       Imaging:  I have reviewed the below testing personally:    EK/2/22  Normal sinus rhythm  Right bundle branch block    STRESS TEST:   8/3/22   Summary    The overall quality of the study is good. There is subdiaphragmatic    attenuation. Left ventricular cavity size is normal. Right ventricular cavity size is    normal.        SPECT imaging demonstrates a small area of mildly decreased perfusion in the    apex and apical inferior segment. The defect is absent at rest and present    at stress, consistent with ischemia. Sum stress score of 1. No visual TID. Calculated TID is 0.97. Left ventricular ejection fraction is normal at 67%. COVID19 negative   ProBNP 208   Troponin <0.01 x 2    Impression/Recommendations    Mr. Godfrey Hernandez is a 78 y.o. male patient    Chest pain, intermittent  Hypertension, stable   Hyperlipidemia, controlled  Cognitive deficit  Hx. CVA   COPD  Former tobacco        Keara Casas is limited as a historian. He does not characterize symptoms in recent months to be crescendo in nature. Nuclear pharmacologic stress test was with a small sized, mild intensity defect in question. Given concern for Shingles placing him in isolation, discussed managing the potential for mild ischemia and/or vasopasm medically at this time. Aspirin, statin, beta blocker. We can revisit additional medications in outpatient follow up. Thank you for allowing me to participate in the care of your patient. Please do not hesitate to call. Matthew Moore DO, [facility]  Interventional Cardiology     o: [phone]  [address] [place] [address]., [address] [facility], [address]      NOTE:  This report was transcribed using voice recognition software. Every effort was made to ensure accuracy; however, inadvertent computerized transcription errors may be present.

## 2022-09-04 ENCOUNTER — HOSPITAL ENCOUNTER (OUTPATIENT)
Age: 82
Setting detail: OBSERVATION
Discharge: NURSING, REHAB OR LONG-TERM CARE | End: 2022-09-06
Attending: PEDIATRICS | Admitting: INTERNAL MEDICINE
Payer: MEDICARE

## 2022-09-04 ENCOUNTER — APPOINTMENT (OUTPATIENT)
Dept: GENERAL RADIOLOGY | Age: 82
End: 2022-09-04
Payer: MEDICARE

## 2022-09-04 DIAGNOSIS — R07.9 CHEST PAIN, UNSPECIFIED TYPE: Primary | ICD-10-CM

## 2022-09-04 LAB
ANION GAP SERPL CALCULATED.3IONS-SCNC: 9 MMOL/L (ref 3–16)
BASOPHILS ABSOLUTE: 0 K/UL (ref 0–0.2)
BASOPHILS RELATIVE PERCENT: 0.6 %
BUN BLDV-MCNC: 15 MG/DL (ref 7–20)
CALCIUM SERPL-MCNC: 9.2 MG/DL (ref 8.3–10.6)
CHLORIDE BLD-SCNC: 99 MMOL/L (ref 99–110)
CO2: 29 MMOL/L (ref 21–32)
CREAT SERPL-MCNC: 1.2 MG/DL (ref 0.8–1.3)
EOSINOPHILS ABSOLUTE: 0.2 K/UL (ref 0–0.6)
EOSINOPHILS RELATIVE PERCENT: 2.1 %
GFR AFRICAN AMERICAN: >60
GFR NON-AFRICAN AMERICAN: 58
GLUCOSE BLD-MCNC: 94 MG/DL (ref 70–99)
HCT VFR BLD CALC: 37.8 % (ref 40.5–52.5)
HEMOGLOBIN: 13 G/DL (ref 13.5–17.5)
LYMPHOCYTES ABSOLUTE: 3.2 K/UL (ref 1–5.1)
LYMPHOCYTES RELATIVE PERCENT: 37.1 %
MCH RBC QN AUTO: 31.6 PG (ref 26–34)
MCHC RBC AUTO-ENTMCNC: 34.4 G/DL (ref 31–36)
MCV RBC AUTO: 91.9 FL (ref 80–100)
MONOCYTES ABSOLUTE: 0.5 K/UL (ref 0–1.3)
MONOCYTES RELATIVE PERCENT: 6.4 %
NEUTROPHILS ABSOLUTE: 4.6 K/UL (ref 1.7–7.7)
NEUTROPHILS RELATIVE PERCENT: 53.8 %
PDW BLD-RTO: 12.6 % (ref 12.4–15.4)
PLATELET # BLD: 239 K/UL (ref 135–450)
PMV BLD AUTO: 8.7 FL (ref 5–10.5)
POTASSIUM REFLEX MAGNESIUM: 4.5 MMOL/L (ref 3.5–5.1)
RBC # BLD: 4.12 M/UL (ref 4.2–5.9)
SODIUM BLD-SCNC: 137 MMOL/L (ref 136–145)
TROPONIN: <0.01 NG/ML
WBC # BLD: 8.5 K/UL (ref 4–11)

## 2022-09-04 PROCEDURE — 80048 BASIC METABOLIC PNL TOTAL CA: CPT

## 2022-09-04 PROCEDURE — 71045 X-RAY EXAM CHEST 1 VIEW: CPT

## 2022-09-04 PROCEDURE — 36415 COLL VENOUS BLD VENIPUNCTURE: CPT

## 2022-09-04 PROCEDURE — 84484 ASSAY OF TROPONIN QUANT: CPT

## 2022-09-04 PROCEDURE — 93005 ELECTROCARDIOGRAM TRACING: CPT | Performed by: PHYSICIAN ASSISTANT

## 2022-09-04 PROCEDURE — 85025 COMPLETE CBC W/AUTO DIFF WBC: CPT

## 2022-09-04 PROCEDURE — 99285 EMERGENCY DEPT VISIT HI MDM: CPT

## 2022-09-04 RX ORDER — AMLODIPINE BESYLATE 10 MG/1
1 TABLET ORAL DAILY
COMMUNITY
Start: 2022-08-22

## 2022-09-04 RX ORDER — ATORVASTATIN CALCIUM 20 MG/1
1 TABLET, FILM COATED ORAL DAILY
COMMUNITY
Start: 2022-08-18

## 2022-09-04 ASSESSMENT — HEART SCORE: ECG: 1

## 2022-09-05 LAB
EKG ATRIAL RATE: 56 BPM
EKG DIAGNOSIS: NORMAL
EKG P AXIS: 89 DEGREES
EKG P-R INTERVAL: 174 MS
EKG Q-T INTERVAL: 496 MS
EKG QRS DURATION: 134 MS
EKG QTC CALCULATION (BAZETT): 478 MS
EKG R AXIS: 56 DEGREES
EKG T AXIS: 49 DEGREES
EKG VENTRICULAR RATE: 56 BPM
TROPONIN: <0.01 NG/ML

## 2022-09-05 PROCEDURE — 6360000002 HC RX W HCPCS: Performed by: PEDIATRICS

## 2022-09-05 PROCEDURE — 1200000000 HC SEMI PRIVATE

## 2022-09-05 PROCEDURE — G0378 HOSPITAL OBSERVATION PER HR: HCPCS

## 2022-09-05 PROCEDURE — 93010 ELECTROCARDIOGRAM REPORT: CPT | Performed by: INTERNAL MEDICINE

## 2022-09-05 PROCEDURE — 6370000000 HC RX 637 (ALT 250 FOR IP): Performed by: PEDIATRICS

## 2022-09-05 PROCEDURE — 36415 COLL VENOUS BLD VENIPUNCTURE: CPT

## 2022-09-05 PROCEDURE — 96372 THER/PROPH/DIAG INJ SC/IM: CPT

## 2022-09-05 PROCEDURE — 97161 PT EVAL LOW COMPLEX 20 MIN: CPT

## 2022-09-05 PROCEDURE — 84484 ASSAY OF TROPONIN QUANT: CPT

## 2022-09-05 PROCEDURE — 99214 OFFICE O/P EST MOD 30 MIN: CPT | Performed by: INTERNAL MEDICINE

## 2022-09-05 PROCEDURE — 2580000003 HC RX 258: Performed by: PEDIATRICS

## 2022-09-05 PROCEDURE — 97530 THERAPEUTIC ACTIVITIES: CPT

## 2022-09-05 PROCEDURE — 94760 N-INVAS EAR/PLS OXIMETRY 1: CPT

## 2022-09-05 RX ORDER — FAMOTIDINE 20 MG/1
20 TABLET, FILM COATED ORAL NIGHTLY PRN
Status: DISCONTINUED | OUTPATIENT
Start: 2022-09-05 | End: 2022-09-06 | Stop reason: HOSPADM

## 2022-09-05 RX ORDER — TAMSULOSIN HYDROCHLORIDE 0.4 MG/1
0.4 CAPSULE ORAL DAILY
Status: DISCONTINUED | OUTPATIENT
Start: 2022-09-05 | End: 2022-09-06 | Stop reason: HOSPADM

## 2022-09-05 RX ORDER — ONDANSETRON 4 MG/1
4 TABLET, ORALLY DISINTEGRATING ORAL EVERY 8 HOURS PRN
Status: DISCONTINUED | OUTPATIENT
Start: 2022-09-05 | End: 2022-09-06 | Stop reason: HOSPADM

## 2022-09-05 RX ORDER — CETIRIZINE HYDROCHLORIDE 10 MG/1
10 TABLET ORAL DAILY
Status: DISCONTINUED | OUTPATIENT
Start: 2022-09-05 | End: 2022-09-06 | Stop reason: HOSPADM

## 2022-09-05 RX ORDER — DONEPEZIL HYDROCHLORIDE 10 MG/1
10 TABLET, FILM COATED ORAL NIGHTLY
Status: DISCONTINUED | OUTPATIENT
Start: 2022-09-05 | End: 2022-09-06 | Stop reason: HOSPADM

## 2022-09-05 RX ORDER — AMLODIPINE BESYLATE 10 MG/1
10 TABLET ORAL DAILY
Status: DISCONTINUED | OUTPATIENT
Start: 2022-09-05 | End: 2022-09-06 | Stop reason: HOSPADM

## 2022-09-05 RX ORDER — SODIUM CHLORIDE 9 MG/ML
INJECTION, SOLUTION INTRAVENOUS PRN
Status: DISCONTINUED | OUTPATIENT
Start: 2022-09-05 | End: 2022-09-06 | Stop reason: HOSPADM

## 2022-09-05 RX ORDER — ATENOLOL 25 MG/1
25 TABLET ORAL DAILY
Status: DISCONTINUED | OUTPATIENT
Start: 2022-09-05 | End: 2022-09-06 | Stop reason: HOSPADM

## 2022-09-05 RX ORDER — LANOLIN ALCOHOL/MO/W.PET/CERES
6 CREAM (GRAM) TOPICAL NIGHTLY
Status: DISCONTINUED | OUTPATIENT
Start: 2022-09-05 | End: 2022-09-05

## 2022-09-05 RX ORDER — ACETAMINOPHEN 650 MG/1
650 SUPPOSITORY RECTAL EVERY 6 HOURS PRN
Status: DISCONTINUED | OUTPATIENT
Start: 2022-09-05 | End: 2022-09-06 | Stop reason: HOSPADM

## 2022-09-05 RX ORDER — SODIUM CHLORIDE 0.9 % (FLUSH) 0.9 %
5-40 SYRINGE (ML) INJECTION EVERY 12 HOURS SCHEDULED
Status: DISCONTINUED | OUTPATIENT
Start: 2022-09-05 | End: 2022-09-06 | Stop reason: HOSPADM

## 2022-09-05 RX ORDER — LISINOPRIL 10 MG/1
10 TABLET ORAL DAILY
Status: DISCONTINUED | OUTPATIENT
Start: 2022-09-05 | End: 2022-09-06 | Stop reason: HOSPADM

## 2022-09-05 RX ORDER — ALBUTEROL SULFATE 90 UG/1
2 AEROSOL, METERED RESPIRATORY (INHALATION) EVERY 4 HOURS PRN
Status: DISCONTINUED | OUTPATIENT
Start: 2022-09-05 | End: 2022-09-06 | Stop reason: HOSPADM

## 2022-09-05 RX ORDER — ONDANSETRON 2 MG/ML
4 INJECTION INTRAMUSCULAR; INTRAVENOUS EVERY 6 HOURS PRN
Status: DISCONTINUED | OUTPATIENT
Start: 2022-09-05 | End: 2022-09-06 | Stop reason: HOSPADM

## 2022-09-05 RX ORDER — ACETAMINOPHEN 325 MG/1
650 TABLET ORAL EVERY 6 HOURS PRN
Status: DISCONTINUED | OUTPATIENT
Start: 2022-09-05 | End: 2022-09-06 | Stop reason: HOSPADM

## 2022-09-05 RX ORDER — ACETAMINOPHEN 325 MG/1
325 TABLET ORAL EVERY 6 HOURS PRN
Status: DISCONTINUED | OUTPATIENT
Start: 2022-09-05 | End: 2022-09-05

## 2022-09-05 RX ORDER — AMLODIPINE BESYLATE 10 MG/1
10 TABLET ORAL DAILY
Status: DISCONTINUED | OUTPATIENT
Start: 2022-09-05 | End: 2022-09-05

## 2022-09-05 RX ORDER — POLYETHYLENE GLYCOL 3350 17 G/17G
17 POWDER, FOR SOLUTION ORAL DAILY PRN
Status: DISCONTINUED | OUTPATIENT
Start: 2022-09-05 | End: 2022-09-06 | Stop reason: HOSPADM

## 2022-09-05 RX ORDER — ENOXAPARIN SODIUM 100 MG/ML
40 INJECTION SUBCUTANEOUS DAILY
Status: DISCONTINUED | OUTPATIENT
Start: 2022-09-05 | End: 2022-09-06 | Stop reason: HOSPADM

## 2022-09-05 RX ORDER — SODIUM CHLORIDE 0.9 % (FLUSH) 0.9 %
5-40 SYRINGE (ML) INJECTION PRN
Status: DISCONTINUED | OUTPATIENT
Start: 2022-09-05 | End: 2022-09-06 | Stop reason: HOSPADM

## 2022-09-05 RX ORDER — ATORVASTATIN CALCIUM 20 MG/1
20 TABLET, FILM COATED ORAL DAILY
Status: DISCONTINUED | OUTPATIENT
Start: 2022-09-05 | End: 2022-09-06 | Stop reason: HOSPADM

## 2022-09-05 RX ORDER — ASPIRIN 81 MG/1
81 TABLET, CHEWABLE ORAL DAILY
Status: DISCONTINUED | OUTPATIENT
Start: 2022-09-05 | End: 2022-09-06 | Stop reason: HOSPADM

## 2022-09-05 RX ORDER — LANOLIN ALCOHOL/MO/W.PET/CERES
6 CREAM (GRAM) TOPICAL NIGHTLY
Status: DISCONTINUED | OUTPATIENT
Start: 2022-09-05 | End: 2022-09-06 | Stop reason: HOSPADM

## 2022-09-05 RX ADMIN — Medication 6 MG: at 02:07

## 2022-09-05 RX ADMIN — LISINOPRIL 10 MG: 10 TABLET ORAL at 09:06

## 2022-09-05 RX ADMIN — ATORVASTATIN CALCIUM 20 MG: 20 TABLET, FILM COATED ORAL at 09:06

## 2022-09-05 RX ADMIN — Medication 10 ML: at 09:08

## 2022-09-05 RX ADMIN — ATENOLOL 25 MG: 25 TABLET ORAL at 09:06

## 2022-09-05 RX ADMIN — TAMSULOSIN HYDROCHLORIDE 0.4 MG: 0.4 CAPSULE ORAL at 09:06

## 2022-09-05 RX ADMIN — DONEPEZIL HYDROCHLORIDE 10 MG: 10 TABLET, FILM COATED ORAL at 21:13

## 2022-09-05 RX ADMIN — Medication 5000 UNITS: at 09:06

## 2022-09-05 RX ADMIN — CETIRIZINE HYDROCHLORIDE 10 MG: 10 TABLET, FILM COATED ORAL at 09:06

## 2022-09-05 RX ADMIN — ENOXAPARIN SODIUM 40 MG: 100 INJECTION SUBCUTANEOUS at 09:05

## 2022-09-05 RX ADMIN — AMLODIPINE BESYLATE 10 MG: 10 TABLET ORAL at 09:06

## 2022-09-05 RX ADMIN — ASPIRIN 81 MG CHEWABLE TABLET 81 MG: 81 TABLET CHEWABLE at 09:06

## 2022-09-05 RX ADMIN — DONEPEZIL HYDROCHLORIDE 10 MG: 10 TABLET, FILM COATED ORAL at 02:07

## 2022-09-05 RX ADMIN — Medication 10 ML: at 21:14

## 2022-09-05 RX ADMIN — Medication 6 MG: at 21:13

## 2022-09-05 ASSESSMENT — ENCOUNTER SYMPTOMS
SHORTNESS OF BREATH: 1
VOMITING: 0
COUGH: 0
NAUSEA: 1
CHEST TIGHTNESS: 1
COLOR CHANGE: 0

## 2022-09-05 NOTE — ED NOTES
.ED SBAR report provider to Mathieu Fairchild[address]. Patient to be transported to Room 5116 via stretcher by ED tech. IV site clean, dry, and intact. MEWS score 0 and pain assessed as 0/10 and documented.       [address]  09/05/22 8415

## 2022-09-05 NOTE — PROGRESS NOTES
Pt S/E. Pt was admitted overnight with chest pain  H&P noted and agree with the plan  Labs noted    Cardio consulted due to previous stress test 8/2/22 with small area of reversible ischemia, was to be managed medically with asa, bb, statin. Troponins negative, ecg reviewed, no ischemic changes. Pt, ot  Can be discharged back to snf today if able, but given holiday may be tomorrow.

## 2022-09-05 NOTE — PROGRESS NOTES
Prognosis: Good  Decision Making: Low Complexity  History: See below  Exam: Strength; ROM; Balance; Ambulation  Clinical Presentation: Stable  Activity Tolerance  Activity Tolerance: Patient tolerated evaluation without incident     Plan   Plan  Specific Instructions for Next Treatment: No acute PT goals  Safety Devices  Type of Devices: All fall risk precautions in place, Call light within reach, Bed alarm in place, Left in bed  Restraints  Restraints Initially in Place: No     Restrictions  Restrictions/Precautions  Restrictions/Precautions: Fall Risk     Subjective   General  Chart Reviewed: Yes  Additional Pertinent Hx: Alli Reilly is a 78 y.o. M h/o HTN, HLD who presents with central chest pressure graded 5/10 without radiation, dyspnea, N, diaphoresis that lasted several hours last night without any exacerbating or alleviating factors that occurred spontaneously. No orthopnea/pnd/sick contacts. Upon workup in ED, EKG showed SR with RBBB without ST-T wave changes, Jia negative x 3, CXR unremarkable. Response To Previous Treatment: Not applicable  Referring Practitioner: Dr. Yvonne Gibbs  Referral Date : 09/05/22  Diagnosis: Chest pain; Unstable angina  Follows Commands: Within Functional Limits  Subjective  Subjective: Pt disoriented to time. Oriented to place and situation. Agreeable to evaluation.          Social/Functional History  Social/Functional History  Lives With: Alone  Type of Home: Facility (Home at [facility] - unclear if senior living vs (I) living)  Home Layout: One level  Home Access: Level entry  Bathroom Shower/Tub: Walk-in shower  Bathroom Toilet: Handicap height  Bathroom Equipment: Grab bars in shower, Shower chair, Grab bars around toilet  Home Equipment: [facility], Rollator  Has the patient had two or more falls in the past year or any fall with injury in the past year?: No  ADL Assistance: Independent  Ambulation Assistance: Independent (With/without rollator)  Transfer Assistance: Independent  Active : Yes  Additional Comments: Will need to verify PLOF information    Vision/Hearing  Vision  Vision: Impaired  Vision Exceptions: Wears glasses for reading  Hearing  Hearing: Within functional limits      Objective     AROM RLE (degrees)  RLE AROM: WFL  AROM LLE (degrees)  LLE AROM : WFL  AROM RUE (degrees)  RUE AROM : WFL  AROM LUE (degrees)  LUE AROM : WFL    Strength RLE  Strength RLE: WFL  Strength LLE  Strength LLE: WFL  Strength RUE  Strength RUE: WFL  Strength LUE  Strength LUE: WFL       Bed mobility  Supine to Sit: Independent  Sit to Supine: Independent    Transfers  Sit to Stand: Modified independent  Stand to sit: Modified independent    Ambulation  Surface: level tile  Device: Rolling Walker  Assistance: Modified Independent  Quality of Gait: Fast pace, mildly kyphotic posture, no LOB. No c/o chest pain or fatigue. Distance: 50', 25'  Stairs/Curb  Stairs?: No      AM-PAC Score  AM-PAC Inpatient Mobility Raw Score : 23 (09/05/22 1434)  AM-PAC Inpatient T-Scale Score : 56.93 (09/05/22 1434)  Mobility Inpatient CMS 0-100% Score: 11.2 (09/05/22 1434)  Mobility Inpatient CMS G-Code Modifier : CI (09/05/22 1434)     Goals  Short Term Goals  Time Frame for Short term goals: No acute PT goals  Patient Goals   Patient goals :  To return to facility      Therapy Time   Individual Concurrent Group Co-treatment   Time In 1405         Time Out 1435         Minutes 30         Timed Code Treatment Minutes: [address] [facility] Evaluation    Lorenzo Medrano PT   Electronically signed by Lorenzo Medrano, [place] [id number] on 9/5/2022 at 2:40 PM

## 2022-09-05 NOTE — ED PROVIDER NOTES
[id number]        Pt Name: Paula Arroyo  MRN: [id number]  [place] 1940  Date of evaluation: 9/4/2022  Provider: MELISA Berrios  PCP: No primary care provider on file. Note Started: 1:32 AM EDT       SUJEY. I have evaluated this patient. My supervising physician was available for consultation. CHIEF COMPLAINT       Chief Complaint   Patient presents with    Chest Pain     States he was having chest pain around 1830. Patient was given 3 nitro by nursing home and 324 of asprin by EMS. EMS states they found him in a wheelchair. Pain is 9/10 pressure. Glucose 79       HISTORY OF PRESENT ILLNESS   (Location, Timing/Onset, Context/Setting, Quality, Duration, Modifying Factors, Severity, Associated Signs and Symptoms)  Note limiting factors. Chief Complaint: Chest pain     Paula Arroyo is a 78 y.o. male who presents to the emergency department today via EMS with complaints of chest pain. Patient reports that he was sitting down resting, when he began to experience chest pain. He describes it as a crushing type pain located in his chest that went into his arm. He felt short of breath, nauseated. He is unsure if he has had pain like this before. He states he does not have a lot of pain at this moment. He has no further complaints. Nursing Notes were all reviewed and agreed with or any disagreements were addressed in the HPI. REVIEW OF SYSTEMS    (2-9 systems for level 4, 10 or more for level 5)     Review of Systems   Constitutional:  Negative for chills and fever. Respiratory:  Positive for chest tightness and shortness of breath. Negative for cough. Cardiovascular:  Negative for chest pain and palpitations. Gastrointestinal:  Positive for nausea. Negative for vomiting. Skin:  Negative for color change and rash. Neurological:  Negative for syncope and light-headedness.    Psychiatric/Behavioral:  Negative for agitation and confusion. Positives and Pertinent negatives as per HPI. Except as noted above in the ROS, all other systems were reviewed and negative. PAST MEDICAL HISTORY     Past Medical History:   Diagnosis Date    Acute prostatitis 11/02/2021    Benign prostatic hyperplasia without lower urinary tract symptoms     Carpal tunnel syndrome     Cerebral infarction [facility])     Cognitive communication deficit     COVID-19 12/30/2021    Emphysema, unspecified (HCC)     Hyperlipidemia     Hypertension     Muscle weakness     Neuralgia and neuritis     Nicotine dependence     Polyosteoarthritis     Radiculopathy     TIA (transient ischemic attack)     Urge incontinence          SURGICAL HISTORY     Past Surgical History:   Procedure Laterality Date    LUNG REMOVAL, PARTIAL Right     noted on CT scan    TONSILLECTOMY           CURRENTMEDICATIONS       Current Discharge Medication List        CONTINUE these medications which have NOT CHANGED    Details   atorvastatin (LIPITOR) 20 MG tablet Take 1 tablet by mouth daily      amLODIPine (NORVASC) 10 MG tablet Take 1 tablet by mouth daily      nitroGLYCERIN (NITROSTAT) 0.4 MG SL tablet up to max of 3 total doses. If no relief after 1 dose, call 911. Qty: 25 tablet, Refills: 3      atenolol (TENORMIN) 25 MG tablet       donepezil (ARICEPT) 10 MG tablet       lisinopril (PRINIVIL;ZESTRIL) 10 MG tablet       albuterol sulfate HFA (PROVENTIL;VENTOLIN;PROAIR) 108 (90 Base) MCG/ACT inhaler Inhale 2 puffs into the lungs every 4 hours as needed for Wheezing      aspirin 81 MG chewable tablet Take 81 mg by mouth in the morning. loratadine (CLARITIN) 10 MG tablet Take 10 mg by mouth in the morning. famotidine (PEPCID) 20 MG tablet Take 20 mg by mouth nightly as needed      tamsulosin (FLOMAX) 0.4 MG capsule Take 0.4 mg by mouth in the morning.       melatonin 5 MG TABS tablet Take 5 mg by mouth nightly      ondansetron (ZOFRAN) 4 MG tablet Take 4 mg by mouth every 8 hours as needed for Nausea or Vomiting      acetaminophen (TYLENOL) 325 MG tablet Take 325 mg by mouth every 6 hours as needed for Pain      Cholecalciferol (VITAMIN D3) 125 MCG (5000 UT) TABS Take 1 tablet by mouth in the morning. ALLERGIES     Patient has no known allergies. FAMILYHISTORY       Family History   Problem Relation Age of Onset    No Known Problems Mother     No Known Problems Father           SOCIAL HISTORY       Social History     Tobacco Use    Smoking status: Former     Packs/day: 0.25     Years: 3.00     Pack years: 0.75     Types: Cigarettes     Quit date: 1987     Years since quittin.6    Smokeless tobacco: Never   Vaping Use    Vaping Use: Never used   Substance Use Topics    Alcohol use: Never    Drug use: Never       SCREENINGS      Heart Score for chest pain patients  History: Moderately Suspicious  ECG: Non-Specifc repolarization disturbance/LBTB/PM  Patient Age: > 65 years  *Risk factors for Atherosclerotic disease: Hypertension, Hypercholesterolemia, Cigarette smoking  Risk Factors: > 3 Risk factors or history of atherosclerotic disease*  Troponin: < 1X normal limit  Heart Score Total: 6      PHYSICAL EXAM    (up to 7 for level 4, 8 or more for level 5)     ED Triage Vitals [22]   BP Temp Temp Source Heart Rate Resp SpO2 Height Weight   (!) 152/60 97.8 °F (36.6 °C) Oral 61 13 97 % 5' 9\" (1.753 m) 156 lb 8.4 oz (71 kg)       Physical Exam  Vitals and nursing note reviewed. Constitutional:       General: He is not in acute distress. Appearance: He is well-developed. He is not ill-appearing, toxic-appearing or diaphoretic. HENT:      Head: Normocephalic and atraumatic. Eyes:      Conjunctiva/sclera: Conjunctivae normal.      Pupils: Pupils are equal, round, and reactive to light. Cardiovascular:      Rate and Rhythm: Normal rate and regular rhythm. Pulmonary:      Effort: Pulmonary effort is normal. No respiratory distress.       Breath sounds: Normal breath sounds. Chest:      Chest wall: No tenderness. Abdominal:      General: Bowel sounds are normal. There is no distension. Palpations: Abdomen is soft. Tenderness: There is no abdominal tenderness. There is no guarding or rebound. Musculoskeletal:      Right lower leg: No edema. Left lower leg: No edema. Skin:     General: Skin is warm and dry. Findings: No rash. Neurological:      General: No focal deficit present. Mental Status: He is alert and oriented to person, place, and time. Psychiatric:         Mood and Affect: Mood normal.         Behavior: Behavior normal. Behavior is cooperative. DIAGNOSTIC RESULTS   LABS:    Labs Reviewed   CBC WITH AUTO DIFFERENTIAL - Abnormal; Notable for the following components:       Result Value    RBC 4.12 (*)     Hemoglobin 13.0 (*)     Hematocrit 37.8 (*)     All other components within normal limits   BASIC METABOLIC PANEL W/ REFLEX TO MG FOR LOW K - Abnormal; Notable for the following components:    GFR Non- 58 (*)     All other components within normal limits   TROPONIN   TROPONIN   TROPONIN       When ordered only abnormal lab results are displayed. All other labs were within normal range or not returned as of this dictation. EKG: When ordered, EKG's are interpreted by the Emergency Department Physician in the absence of a cardiologist.  Please see their note for interpretation of EKG. RADIOLOGY:   Non-plain film images such as CT, Ultrasound and MRI are read by the radiologist. Plain radiographic images are visualized and preliminarily interpreted by the ED Provider with the below findings:    Interpretation per the Radiologist below, if available at the time of this note:    XR CHEST PORTABLE   Preliminary Result   No acute cardiopulmonary findings.            XR CHEST PORTABLE    Result Date: 9/4/2022  EXAMINATION: ONE XRAY VIEW OF THE CHEST 9/4/2022 9:14 pm COMPARISON: Radiograph 08/02/2022, CT chest 07/08/2022 HISTORY: ORDERING SYSTEM PROVIDED HISTORY: Chest pain TECHNOLOGIST PROVIDED HISTORY: Reason for exam:->Chest pain Reason for Exam: chest pain FINDINGS: The cardiomediastinal silhouette is unchanged. Partially calcified left upper lobe mass is similar to prior. 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 No new focal consolidation, pneumothorax, or pleural effusion. Osseous structures are grossly unchanged and diffusely demineralized. No acute cardiopulmonary findings.            PROCEDURES   Unless otherwise noted below, none     Procedures      CONSULTS:  IP CONSULT TO HOSPITALIST  IP CONSULT TO CARDIOLOGY      EMERGENCY DEPARTMENT COURSE and DIFFERENTIAL DIAGNOSIS/MDM:   Vitals:    Vitals:    09/04/22 2045 09/05/22 0030 09/05/22 0100 09/05/22 0108   BP: (!) 152/60 133/60 (!) 150/65    Pulse: 61 51 53    Resp: 13 13 12    Temp: 97.8 °F (36.6 °C)  97.5 °F (36.4 °C)    TempSrc: Oral  Oral    SpO2: 97% 97% 96%    Weight: 156 lb 8.4 oz (71 kg)   150 lb 9.2 oz (68.3 kg)   Height: 5' 9\" (1.753 m)          Patient was given the following medications:  Medications   tamsulosin (FLOMAX) capsule 0.4 mg (has no administration in time range)   melatonin tablet 5 mg (has no administration in time range)   cetirizine (ZYRTEC) tablet 10 mg (has no administration in time range)   lisinopril (PRINIVIL;ZESTRIL) tablet 10 mg (has no administration in time range)   famotidine (PEPCID) tablet 20 mg (has no administration in time range)   donepezil (ARICEPT) tablet 10 mg (has no administration in time range)   Vitamin D3 TABS 5,000 Units (has no administration in time range)   atorvastatin (LIPITOR) tablet 20 mg (has no administration in time range)   atenolol (TENORMIN) tablet 25 mg (has no administration in time range)   aspirin chewable tablet 81 mg (has no administration in time range)   amLODIPine (NORVASC) tablet 10 mg (has no administration in time range)   albuterol sulfate HFA (PROVENTIL;VENTOLIN;PROAIR) 108 (90 Base) MCG/ACT inhaler 2 puff (has no administration in time range)   sodium chloride flush 0.9 % injection 5-40 mL (has no administration in time range)   sodium chloride flush 0.9 % injection 5-40 mL (has no administration in time range)   0.9 % sodium chloride infusion (has no administration in time range)   enoxaparin (LOVENOX) injection 40 mg (has no administration in time range)   ondansetron (ZOFRAN-ODT) disintegrating tablet 4 mg (has no administration in time range)     Or   ondansetron (ZOFRAN) injection 4 mg (has no administration in time range)   polyethylene glycol (GLYCOLAX) packet 17 g (has no administration in time range)   acetaminophen (TYLENOL) tablet 650 mg (has no administration in time range)     Or   acetaminophen (TYLENOL) suppository 650 mg (has no administration in time range)         Is this patient to be included in the SEP-1 Core Measure due to severe sepsis or septic shock? No   Exclusion criteria - the patient is NOT to be included for SEP-1 Core Measure due to: Infection is not suspected    ED COURSE & MEDICAL DECISION MAKING    - The patient presented to the ER with complaints of chest pain. Vital signs were reviewed. Exam as above. Peripheral IV placed. Labs, Imaging ordered. - Pertinent Labs & Imaging studies reviewed. (See chart for details)   -  Patient seen and evaluated in the emergency department. -  Triage and nursing notes reviewed and incorporated. -  Old chart records reviewed and incorporated. Based on record review, it appears that the patient had a similar episode earlier this month. He did have a stress test done that showed a reversible area of ischemia that was considered a low to moderate risk study. However further testing was declined at the time and medical management was recommended because the patient also had concurrent shingles. -  SUJEY. I have evaluated this patient.   My supervising physician was available for consultation.  -  Differential diagnosis includes: acute coronary syndrome, pulmonary embolism, COPD/asthma, pneumonia, musculoskeletal, reflux/PUD/gastritis, pneumothorax, CHF, thoracic aortic dissection, anxiety  -  Work-up included:  See above  -  ED treatment included: Prior to arrival patient was given aspirin nitroglycerin by EMS, and on my reevaluation he is pain-free  - Consults: Hospitalist for admission  -  Results discussed with patient and/or family. Labs show no leukocytosis, hemoglobin of 13, hematocrit of 33.8, metabolic panel with no concerning abnormalities. Troponin is less than 0.01. Imaging studies show no acute process on chest x-ray. Please see attending physician's note for EKG interpretation. His heart score is 6. At this time, we recommend admission, as the patient would benefit from admission for further evaluation management. The patient and/or family is agreeable with plan of care and disposition.  -  Disposition: Admission  - Critical Care: The total critical care time I independently spent while evaluating and treating this patient was 12 minutes. This excludes time spent doing separately billable procedures. This includes time at the bedside, data interpretation, medication management, obtaining critical history from collateral sources if the patient is unable to provide it directly, and physician consultation. Specifics of interventions taken and potentially life-threatening diagnostic considerations are listed above in the medical decision making. If this was a shared visit with an physician, the time in this attestation is non-concurrent critical care time out of the total shared critical care time provided by the physician and myself. FINAL IMPRESSION      1. Chest pain, unspecified type          DISPOSITION/PLAN   DISPOSITION Admitted 09/05/2022 12:17:19 AM      PATIENT REFERRED TO:  No follow-up provider specified.     DISCHARGE MEDICATIONS:  Current Discharge Medication List          DISCONTINUED MEDICATIONS:  Current Discharge Medication List                 (Please note that portions of this note were completed with a voice recognition program.  Efforts were made to edit the dictations but occasionally words are mis-transcribed.)    MELISA Blount (electronically signed)           Colette Blount  09/05/22 6154

## 2022-09-05 NOTE — PLAN OF CARE
Problem: Safety - Adult  Goal: Free from fall injury  Outcome: Progressing  Bed and chair alarm utilized     Problem: Musculoskeletal - Adult  Goal: Return mobility to safest level of function  Outcome: Progressing   Patient has ambulated to the bathroom, and to the chair this shift.

## 2022-09-05 NOTE — CONSULTS
Cardiology Consultation   Date: 9/5/2022  Admit Date:  9/4/2022  Reason for Consultation: chest pressure, dyspnea, N  Consult Requesting Physician: Mayte Samaniego DO     Chief Complaint   Patient presents with    Chest Pain     States he was having chest pain around 1830. Patient was given 3 nitro by nursing home and 324 of asprin by EMS. EMS states they found him in a wheelchair. Pain is 9/10 pressure. Glucose 79     HPI: Eura Leyden is a 78 y.o. M h/o HTN, HLD who presents with central chest pressure graded 5/10 without radiation, dyspnea, N, diaphoresis that lasted several hours last night without any exacerbating or alleviating factors that occurred spontaneously. No orthopnea/pnd/sick contacts. Upon workup in ED, EKG showed SR with RBBB without ST-T wave changes, Jia negative x 3, CXR unremarkable. Past Medical History:   Diagnosis Date    Acute prostatitis 11/02/2021    Benign prostatic hyperplasia without lower urinary tract symptoms     Carpal tunnel syndrome     Cerebral infarction [facility])     Cognitive communication deficit     COVID-19 12/30/2021    Emphysema, unspecified (HCC)     Hyperlipidemia     Hypertension     Muscle weakness     Neuralgia and neuritis     Nicotine dependence     Polyosteoarthritis     Radiculopathy     TIA (transient ischemic attack)     Urge incontinence         Past Surgical History:   Procedure Laterality Date    LUNG REMOVAL, PARTIAL Right     noted on CT scan    TONSILLECTOMY         No Known Allergies    Social History:  Reviewed. reports that he quit smoking about 35 years ago. His smoking use included cigarettes. He has a 0.75 pack-year smoking history. He has never used smokeless tobacco. He reports that he does not drink alcohol and does not use drugs. Family History:  Reviewed. family history includes No Known Problems in his father and mother. No premature CAD. Review of System:  All other systems reviewed except for that noted above.  Pertinent negatives and positives are:     General: negative for fever, chills   Ophthalmic ROS: negative for - eye pain or loss of vision  ENT ROS: negative for - headaches, sore throat   Respiratory: negative for - cough, sputum  Cardiovascular: Reviewed in HPI  Gastrointestinal: negative for - abdominal pain, diarrhea, N/V  Hematology: negative for - bleeding, blood clots, bruising or jaundice  Genito-Urinary:  negative for - Dysuria or incontinence  Musculoskeletal: negative for - Joint swelling, muscle pain  Neurological: negative for - confusion, dizziness, headaches   Psychiatric: No anxiety, no depression. Dermatological: negative for - rash    Physical Examination:  Vitals:    22 0906   BP: (!) 152/61   Pulse: 62   Resp:    Temp:    SpO2:         Intake/Output Summary (Last 24 hours) at 2022 1112  Last data filed at 2022 0908  Gross per 24 hour   Intake 10 ml   Output --   Net 10 ml     In: 10 [I.V.:10]  Out: -    Wt Readings from Last 3 Encounters:   22 150 lb 9.2 oz (68.3 kg)   22 158 lb 11.7 oz (72 kg)     Temp  Av.7 °F (36.5 °C)  Min: 97.5 °F (36.4 °C)  Max: 97.8 °F (36.6 °C)  Pulse  Av.8  Min: 51  Max: 62  BP  Min: 133/60  Max: 154/69  SpO2  Av.6 %  Min: 96 %  Max: 100 %    Telemetry: Sinus rhythm with v-rates 50's bpm  Constitutional: Alert. Oriented to person, place, and time. No distress. Head: Normocephalic and atraumatic. Mouth/Throat: Lips appear moist. Oropharynx is clear and moist.  Eyes: Conjunctivae normal. EOM are normal.   Neck: Neck supple. No lymphadenopathy. No rigidity. No JVD present. Cardiovascular: Normal rate, regular rhythm. Normal S1&S2. Carotid pulse 2+ bilaterally. Pulmonary/Chest: Bilateral respiratory sounds present. No respiratory accessory muscle use. No wheezes, No rhonchi. No bibasilar rales. Abdominal: Soft. Normal bowel sounds present. No distension, No tenderness. No splenomegaly. No hernia. Musculoskeletal: No tenderness. Full range of motion in bilateral upper and lower extremities. No pitting BLE edema    Lymphadenopathy: Has no cervical adenopathy. Neurological: Alert and oriented. Cranial nerve II-XII grossly intact, No gross deficit to touch. Skin: Skin is warm and dry. No rash, lesions, ulcerations noted. Psychiatric: No anxiety nor agitation. Labs:  Reviewed. Recent Labs     09/04/22 2123      K 4.5   CL 99   CO2 29   BUN 15   CREATININE 1.2     Recent Labs     09/04/22 2123   WBC 8.5   HGB 13.0*   HCT 37.8*   MCV 91.9        Lab Results   Component Value Date/Time    TROPONINI <0.01 09/05/2022 08:42 AM     No results found for: BNP  No results found for: PROTIME, INR  Lab Results   Component Value Date/Time    CHOL 118 08/02/2022 07:13 AM    HDL 50 08/02/2022 07:13 AM    TRIG 55 08/02/2022 07:13 AM       Diagnostic and imaging results reviewed. ECG: SR with RBBB with v-rates 56 bpm without ST-T wave changes. NMS 8/3/2022:   Summary    The overall quality of the study is good. There is subdiaphragmatic    attenuation. Left ventricular cavity size is normal. Right ventricular cavity size is    normal.        SPECT imaging demonstrates a small area of mildly decreased perfusion in the    apex and apical inferior segment. The defect is absent at rest and present    at stress, consistent with ischemia. Sum stress score of 1. No visual TID. Calculated TID is 0.97. Left ventricular ejection fraction is normal at 67%. Cath: n/a    I independently reviewed and interpreted the ECG, telemetry, serology, echocardiographic results.     Scheduled Meds:   tamsulosin  0.4 mg Oral Daily    cetirizine  10 mg Oral Daily    lisinopril  10 mg Oral Daily    donepezil  10 mg Oral Nightly    vitamin D  5,000 Units Oral Weekly    atorvastatin  20 mg Oral Daily    atenolol  25 mg Oral Daily    aspirin  81 mg Oral Daily    sodium chloride flush  5-40 mL IntraVENous 2 times per day    enoxaparin  40 mg SubCUTAneous Daily    amLODIPine  10 mg Oral Daily    melatonin  6 mg Oral Nightly     Continuous Infusions:   sodium chloride       PRN Meds:.famotidine, albuterol sulfate HFA, sodium chloride flush, sodium chloride, ondansetron **OR** ondansetron, polyethylene glycol, acetaminophen **OR** acetaminophen     Assessment:   Patient Active Problem List    Diagnosis Date Noted    Unstable angina ([facility] [address] 08/04/2022    Primary hypertension 08/04/2022    Dementia ([facility] 08/04/2022    Chest pain 08/02/2022      Active Hospital Problems    Diagnosis Date Noted    Chest pain [R07.9] 08/02/2022     Priority: Medium         Recommendation(s):    Chest pressure  -history does not appear consistent with ACS  -Jia negative x 3  -EKG unremarkable for ACS  -NMS 8/2022 reveals a very small area of possible ischemia. Would not suspect that this is clinically relevant to this hospital's admission. Would recommend outpatient general cardiology follow-up to manage such.  -cont ASA 81mg, atenolol 25mg, atorva 20, lisinopril    Will sign off. Thank you for allowing me to participate in the care of Luis Enrique [facility] . If you have any questions/comments, please do not hesitate to contact us.       Itzel Myrick MD, MS, [facility], [facility]  Cardiac Electrophysiology  [address]  [address] [facility], [address] [facility]  [place] [facility] [address]  [phone]

## 2022-09-05 NOTE — H&P
Hospital Medicine History & Physical      PCP: No primary care provider on file. Date of Admission: 2022    Date of Service: Pt seen/examined on 22 and Admitted to Inpatient with expected LOS greater than two midnights due to medical therapy. Chief Complaint:  chest pain       History Of Present Illness:       78 y.o. male who presented to [facility] with report that he had chest pain tonight, he thought he was going to pass out, and he had a hard time catching his breath. He described the sensation to ED staff as a large plank sitting on his chest.   He reports symptoms started while he was walking. Symptoms lasted 20-30 minutes before lessening. He reports pressure in his chest now is \"not as bad. \"     Presenting TNI < 0.01    Heena Phillpis reports he did receive aspirin today. He had a NM myocardial spect study 22  Results included    SPECT imaging demonstrates a small area of mildly decreased perfusion in the    apex and apical inferior segment. The defect is absent at rest and present    at stress, consistent with ischemia. **Myocardial perfusion is abnormal. Reversible ischemia. Low-moderate risk    scan**     It seems that further workup was limited as he had recently suffered from shingles - but this has since resolved. See note from cardiology consult 22 - medical management was planned at that time. He denies running out of his medications at home and taking them as prescribed.      ROS:   No fever   + intermittent cough   No nausea   No vomiting   Occasional constipation   No dysuria   Denies recent bleeding     FamHx:   - he isn't sure what his parents  from     SocHx:   - occasionally smokes, cigarettes   - does not drink   -    - has 4 children       Past Medical History:          Diagnosis Date    Acute prostatitis 2021    Benign prostatic hyperplasia without lower urinary tract symptoms     Carpal tunnel syndrome     Cerebral infarction [facility])     Cognitive communication deficit     COVID-19 12/30/2021    Emphysema, unspecified ([facility] [address]     Hyperlipidemia     Hypertension     Muscle weakness     Neuralgia and neuritis     Nicotine dependence     Polyosteoarthritis     Radiculopathy     TIA (transient ischemic attack)     Urge incontinence        Past Surgical History:          Procedure Laterality Date    LUNG REMOVAL, PARTIAL Right     noted on CT scan    TONSILLECTOMY         Medications Prior to Admission:      Prior to Admission medications    Medication Sig Start Date End Date Taking? Authorizing Provider   atorvastatin (LIPITOR) 20 MG tablet Take 1 tablet by mouth daily 8/18/22   Historical Provider, MD   amLODIPine (NORVASC) 10 MG tablet Take 1 tablet by mouth daily 8/22/22   Historical Provider, MD   nitroGLYCERIN (NITROSTAT) 0.4 MG SL tablet up to max of 3 total doses. If no relief after 1 dose, call 911. 8/4/22   José Miguel Martinez MD   atenolol (TENORMIN) 25 MG tablet  6/21/22   Historical Provider, MD   donepezil (ARICEPT) 10 MG tablet  6/21/22   Historical Provider, MD   lisinopril (PRINIVIL;ZESTRIL) 10 MG tablet  6/21/22   Historical Provider, MD   albuterol sulfate HFA (PROVENTIL;VENTOLIN;PROAIR) 108 (90 Base) MCG/ACT inhaler Inhale 2 puffs into the lungs every 4 hours as needed for Wheezing    Historical Provider, MD   aspirin 81 MG chewable tablet Take 81 mg by mouth in the morning. Historical Provider, MD   loratadine (CLARITIN) 10 MG tablet Take 10 mg by mouth in the morning. Historical Provider, MD   famotidine (PEPCID) 20 MG tablet Take 20 mg by mouth nightly as needed    Historical Provider, MD   tamsulosin (FLOMAX) 0.4 MG capsule Take 0.4 mg by mouth in the morning.     Historical Provider, MD   melatonin 5 MG TABS tablet Take 5 mg by mouth nightly    Historical Provider, MD   ondansetron (ZOFRAN) 4 MG tablet Take 4 mg by mouth every 8 hours as needed for Nausea or Vomiting    Historical Provider, MD   acetaminophen (TYLENOL) 325 MG tablet Take 325 mg by mouth every 6 hours as needed for Pain    Historical Provider, MD   Cholecalciferol (VITAMIN D3) 125 MCG (5000 UT) TABS Take 1 tablet by mouth in the morning. Historical Provider, MD       Allergies:  Patient has no known allergies. Social History:      The patient currently lives at home with his wife     TOBACCO:   reports that he quit smoking about 35 years ago. His smoking use included cigarettes. He has a 0.75 pack-year smoking history. He has never used smokeless tobacco.  ETOH:   reports no history of alcohol use. E-cigarette/Vaping       Questions Responses    E-cigarette/Vaping Use Never User    Start Date     Passive Exposure No    Quit Date     Counseling Given No    Comments               Family History:               Problem Relation Age of Onset    No Known Problems Mother     No Known Problems Father        REVIEW OF SYSTEMS COMPLETED:   Pertinent positives as noted in the HPI. All other systems reviewed and negative. PHYSICAL EXAM PERFORMED:    BP (!) 150/65   Pulse 53   Temp 97.5 °F (36.4 °C) (Oral)   Resp 12   Ht 5' 9\" (1.753 m)   Wt 150 lb 9.2 oz (68.3 kg)   SpO2 96%   BMI 22.24 kg/m²     General appearance:  No apparent distress, appears stated age and cooperative. HEENT:  Normal cephalic, atraumatic without obvious deformity. Pupils equal, round, and reactive to light. Extra ocular muscles intact. Conjunctivae/corneas clear. Neck: Supple, with full range of motion. No jugular venous distention. Trachea midline. Respiratory:  Normal respiratory effort. Clear to auscultation, bilaterally without Rales/Wheezes/Rhonchi. Cardiovascular:  Regular rate and rhythm with normal S1/S2 without murmurs, rubs or gallops. Abdomen: Soft, non-tender, non-distended with normal bowel sounds. Musculoskeletal:  No clubbing, cyanosis or edema bilaterally. Full range of motion without deformity.   Skin: Skin color, texture, turgor normal.  No rashes or lesions. Neurologic:    Speech clear   No facial droop  Moves ext x 4   Psychiatric:  Alert and oriented   Capillary Refill: Brisk,3 seconds, normal  Peripheral Pulses: +2 palpable, equal bilaterally       Labs:     Recent Labs     09/04/22 2123   WBC 8.5   HGB 13.0*   HCT 37.8*        Recent Labs     09/04/22 2123      K 4.5   CL 99   CO2 29   BUN 15   CREATININE 1.2   CALCIUM 9.2     No results for input(s): AST, ALT, BILIDIR, BILITOT, ALKPHOS in the last 72 hours. No results for input(s): INR in the last 72 hours. Recent Labs     09/04/22 2123 09/05/22  0247   TROPONINI <0.01 <0.01       Urinalysis:    No results found for: Nito Neth, BACTERIA, RBCUA, BLOODU, [address], GLUCOSEU    Radiology:     CXR: I have reviewed the CXR with the following interpretation:   EKG:  I have reviewed the EKG with the following interpretation:     XR CHEST PORTABLE   Preliminary Result   No acute cardiopulmonary findings.              Consults:    IP CONSULT TO HOSPITALIST  IP CONSULT TO CARDIOLOGY    ASSESSMENT:    Active Hospital Problems    Diagnosis Date Noted    Chest pain [R07.9] 08/02/2022     Priority: Medium       Berdine Alicia is a 78 y.o. male     Chest pain:   - cardiology consulted   - serial TNI   - telemetry   - aspirin 81 mg po daily   - atenolol 25 mg po daily     Normocytic anemia:   - pt denies recent bleeding   - follow     Hx of cerebral infarction with communication deficit, dementia:   - donepezil 10 mg po daily     Hypertension:   - amlodipine 10 mg po daily   - lisinopril 10 mg po daily     Dyslipidemia:   - atorvastatin 20 mg po daily     Reflux:   - famotidine 20 mg po qhs     Rhinitis:   - loratadine --> cetirizine     BPH:   - tamsulosin 0.4 mg po daily     Sleep aid:   - melatonin qhs     Supplement:   - vit D     DVT Prophylaxis: enoxaparin   Diet: Diet NPO Exceptions are: Sips of Water with Meds, Ice Chips, Sips of Clear Liquids  Code Status: Full Code  - alternative decision maker - son Alan Reddy)     PT/OT Eval Status: not consulted     Dispo - pending eval / improvement        Shelly Mendez MD    Thank you No primary care provider on file. for the opportunity to be involved in this patient's care.

## 2022-09-06 VITALS
HEART RATE: 54 BPM | BODY MASS INDEX: 22.73 KG/M2 | RESPIRATION RATE: 18 BRPM | HEIGHT: 69 IN | WEIGHT: 153.44 LBS | TEMPERATURE: 98.5 F | OXYGEN SATURATION: 98 % | SYSTOLIC BLOOD PRESSURE: 137 MMHG | DIASTOLIC BLOOD PRESSURE: 77 MMHG

## 2022-09-06 PROBLEM — R07.89 MUSCULOSKELETAL CHEST PAIN: Status: ACTIVE | Noted: 2022-09-06

## 2022-09-06 LAB
ALBUMIN SERPL-MCNC: 3.5 G/DL (ref 3.4–5)
ANION GAP SERPL CALCULATED.3IONS-SCNC: 11 MMOL/L (ref 3–16)
BASOPHILS ABSOLUTE: 0.1 K/UL (ref 0–0.2)
BASOPHILS RELATIVE PERCENT: 0.8 %
BUN BLDV-MCNC: 17 MG/DL (ref 7–20)
CALCIUM SERPL-MCNC: 9.2 MG/DL (ref 8.3–10.6)
CHLORIDE BLD-SCNC: 104 MMOL/L (ref 99–110)
CO2: 26 MMOL/L (ref 21–32)
CREAT SERPL-MCNC: 1.2 MG/DL (ref 0.8–1.3)
EOSINOPHILS ABSOLUTE: 0.1 K/UL (ref 0–0.6)
EOSINOPHILS RELATIVE PERCENT: 1.9 %
GFR AFRICAN AMERICAN: >60
GFR NON-AFRICAN AMERICAN: 58
GLUCOSE BLD-MCNC: 85 MG/DL (ref 70–99)
HCT VFR BLD CALC: 36.6 % (ref 40.5–52.5)
HEMOGLOBIN: 12.8 G/DL (ref 13.5–17.5)
LYMPHOCYTES ABSOLUTE: 3.1 K/UL (ref 1–5.1)
LYMPHOCYTES RELATIVE PERCENT: 42.5 %
MAGNESIUM: 1.9 MG/DL (ref 1.8–2.4)
MCH RBC QN AUTO: 31.8 PG (ref 26–34)
MCHC RBC AUTO-ENTMCNC: 35 G/DL (ref 31–36)
MCV RBC AUTO: 91 FL (ref 80–100)
MONOCYTES ABSOLUTE: 0.6 K/UL (ref 0–1.3)
MONOCYTES RELATIVE PERCENT: 8.3 %
NEUTROPHILS ABSOLUTE: 3.4 K/UL (ref 1.7–7.7)
NEUTROPHILS RELATIVE PERCENT: 46.5 %
PDW BLD-RTO: 13 % (ref 12.4–15.4)
PHOSPHORUS: 3.6 MG/DL (ref 2.5–4.9)
PLATELET # BLD: 223 K/UL (ref 135–450)
PMV BLD AUTO: 9.3 FL (ref 5–10.5)
POTASSIUM SERPL-SCNC: 4.6 MMOL/L (ref 3.5–5.1)
RBC # BLD: 4.03 M/UL (ref 4.2–5.9)
SARS-COV-2, NAAT: NOT DETECTED
SODIUM BLD-SCNC: 141 MMOL/L (ref 136–145)
WBC # BLD: 7.3 K/UL (ref 4–11)

## 2022-09-06 PROCEDURE — 85025 COMPLETE CBC W/AUTO DIFF WBC: CPT

## 2022-09-06 PROCEDURE — 36415 COLL VENOUS BLD VENIPUNCTURE: CPT

## 2022-09-06 PROCEDURE — 6370000000 HC RX 637 (ALT 250 FOR IP): Performed by: PEDIATRICS

## 2022-09-06 PROCEDURE — G0378 HOSPITAL OBSERVATION PER HR: HCPCS

## 2022-09-06 PROCEDURE — 96372 THER/PROPH/DIAG INJ SC/IM: CPT

## 2022-09-06 PROCEDURE — 83735 ASSAY OF MAGNESIUM: CPT

## 2022-09-06 PROCEDURE — 80069 RENAL FUNCTION PANEL: CPT

## 2022-09-06 PROCEDURE — 87635 SARS-COV-2 COVID-19 AMP PRB: CPT

## 2022-09-06 PROCEDURE — 2580000003 HC RX 258: Performed by: PEDIATRICS

## 2022-09-06 PROCEDURE — 6360000002 HC RX W HCPCS: Performed by: PEDIATRICS

## 2022-09-06 RX ADMIN — LISINOPRIL 10 MG: 10 TABLET ORAL at 10:37

## 2022-09-06 RX ADMIN — AMLODIPINE BESYLATE 10 MG: 10 TABLET ORAL at 10:37

## 2022-09-06 RX ADMIN — ASPIRIN 81 MG CHEWABLE TABLET 81 MG: 81 TABLET CHEWABLE at 10:37

## 2022-09-06 RX ADMIN — CETIRIZINE HYDROCHLORIDE 10 MG: 10 TABLET, FILM COATED ORAL at 10:37

## 2022-09-06 RX ADMIN — ENOXAPARIN SODIUM 40 MG: 100 INJECTION SUBCUTANEOUS at 10:36

## 2022-09-06 RX ADMIN — TAMSULOSIN HYDROCHLORIDE 0.4 MG: 0.4 CAPSULE ORAL at 10:37

## 2022-09-06 RX ADMIN — ATORVASTATIN CALCIUM 20 MG: 20 TABLET, FILM COATED ORAL at 10:37

## 2022-09-06 RX ADMIN — ATENOLOL 25 MG: 25 TABLET ORAL at 10:37

## 2022-09-06 RX ADMIN — Medication 10 ML: at 10:37

## 2022-09-06 NOTE — DISCHARGE INSTR - COC
Continuity of Care Form    Patient Name: Ken Subramanian   :  1940  MRN:  [id number]    Admit date:  2022  Discharge date:  22    Code Status Order: Full Code   Advance Directives:     Admitting Physician:  Angelina Navarrete MD  PCP: No primary care provider on file.     Discharging Nurse: [facility] DR LEANNA PINEDA Unit/Room#: 7900  1826 Unit Phone Number: [phone]    Emergency Contact:   Extended Emergency Contact Information  Primary Emergency Contact: [address] Phone: [phone]  Relation: Child  Secondary Emergency Contact: [address] Phone: [phone]  Relation: Child    Past Surgical History:  Past Surgical History:   Procedure Laterality Date    LUNG REMOVAL, PARTIAL Right     noted on CT scan    TONSILLECTOMY         Immunization History:   Immunization History   Administered Date(s) Administered    COVID-19, PFIZER PURPLE top, DILUTE for use, (age 15 y+), 30mcg/0.3mL 2021, 2021, 2021       Active Problems:  Patient Active Problem List   Diagnosis Code    Chest pain R07.9    Unstable angina ([facility] [address] I20.0    Primary hypertension I10    Dementia ([facility] [address] F03.90    Musculoskeletal chest pain R07.89       Isolation/Infection:   Isolation            No Isolation          Patient Infection Status       None to display            Nurse Assessment:  Last Vital Signs: BP (!) 142/63   Pulse 62   Temp 98.1 °F (36.7 °C) (Oral)   Resp 14   Ht 5' 9\" (1.753 m)   Wt 153 lb 7 oz (69.6 kg)   SpO2 97%   BMI 22.66 kg/m²     Last documented pain score (0-10 scale):    Last Weight:   Wt Readings from Last 1 Encounters:   22 153 lb 7 oz (69.6 kg)     Mental Status:  disoriented, alert, and able to concentrate and follow conversation    IV Access:  - None    Nursing Mobility/ADLs:  Walking   Assisted  Transfer  Assisted  Bathing  Assisted  Dressing  Assisted  Toileting  Assisted  Feeding  [address]  Dependent  Med Delivery whole    Wound Care Documentation and Therapy:        Elimination:  Continence: Bowel: Yes  Bladder: No  Urinary Catheter: None   Colostomy/Ileostomy/Ileal Conduit: No       Date of Last BM: 9/6/22    Intake/Output Summary (Last 24 hours) at 9/6/2022 0930  Last data filed at 9/5/2022 1400  Gross per 24 hour   Intake 360 ml   Output --   Net 360 ml     I/O last 3 completed shifts: In: 400 [P.O.:390; I.V.:10]  Out: -     Safety Concerns:     None    Impairments/Disabilities:      None    Nutrition Therapy:  Current Nutrition Therapy:   - Oral Diet:  Cardiac    Routes of Feeding: Oral  Liquids: Thin Liquids  Daily Fluid Restriction: no  Last Modified Barium Swallow with Video (Video Swallowing Test): not done    Treatments at the Time of Hospital Discharge:   Respiratory Treatments: None  Oxygen Therapy:  is not on home oxygen therapy.   Ventilator:    - No ventilator support    Rehab Therapies: Facility to obtain part B therapies  Weight Bearing Status/Restrictions: No weight bearing restrictions  Other Medical Equipment (for information only, NOT a DME order):  wheelchair  Other Treatments: NA    Patient's personal belongings (please select all that are sent with patient):  Shoes, sweat pants    RN SIGNATURE:  Electronically signed by Yasmin Sierra RN on 9/6/22 at 2:25 PM EDT    CASE MANAGEMENT/SOCIAL WORK SECTION    Inpatient Status Date: 9/4/22    Readmission Risk Assessment Score:  Readmission Risk              Risk of Unplanned Readmission:  13         Discharging to Facility/ Agency   Name: [facility]  Address:  [address] [place] [address]  Phone:  [phone]  Fax:  [phone]     Transportation with [address] [facility] at 3:15pm    / signature: Electronically signed by Carmelo Pavon RN on 9/6/22 at 11:16 AM EDT    PHYSICIAN SECTION    Prognosis: Good    Condition at Discharge: Stable    Rehab Potential (if transferring to Rehab): Good    Recommended Labs or Other Treatments After Discharge: NA    Physician Certification: I certify the above information and transfer of Paula Arroyo  is necessary for the continuing treatment of the diagnosis listed and that he requires [facility] for less 30 days.      Update Admission H&P: No change in H&P    PHYSICIAN SIGNATURE:  Electronically signed by Jimy John MD on 9/6/22 at 9:30 AM EDT

## 2022-09-06 NOTE — ACP (ADVANCE CARE PLANNING)
Advance Care Planning     Advance Care Planning Activator (Inpatient)  Conversation Note      Date of ACP Conversation: 9/6/2022     Conversation Conducted with: Patient with [address] 49: Next of Kin by law (only applies in absence of above) (name) Blanche Dyson and [employer]    ACP Activator: Parvez Ingram RN    Health Care Decision Maker:     Current Designated Health Care Decision Maker:     Primary Decision Maker: Jazmine Teixeira - [phone]    Secondary Decision Maker: Landry Jones - Child - [phone]    Today we documented Decision Maker(s) consistent with Legal Next of Kin hierarchy. Care Preferences    Ventilation: \"If you were in your present state of health and suddenly became very ill and were unable to breathe on your own, what would your preference be about the use of a ventilator (breathing machine) if it were available to you? \"      Would the patient desire the use of ventilator (breathing machine)?: yes    \"If your health worsens and it becomes clear that your chance of recovery is unlikely, what would your preference be about the use of a ventilator (breathing machine) if it were available to you? \"     Would the patient desire the use of ventilator (breathing machine)?: No      Resuscitation  \"CPR works best to restart the heart when there is a sudden event, like a heart attack, in someone who is otherwise healthy. Unfortunately, CPR does not typically restart the heart for people who have serious health conditions or who are very sick. \"    \"In the event your heart stopped as a result of an underlying serious health condition, would you want attempts to be made to restart your heart (answer \"yes\" for attempt to resuscitate) or would you prefer a natural death (answer \"no\" for do not attempt to resuscitate)? \" yes       [] Yes   [] No   Educated Patient / Azalai Carson regarding differences between Advance Directives and portable DNR orders.     Length of ACP Conversation in minutes:      Conversation Outcomes:  [x] ACP discussion completed  [] Existing advance directive reviewed with patient; no changes to patient's previously recorded wishes  [] New Advance Directive completed  [] Portable Do Not Rescitate prepared for Provider review and signature  [] POLST/POST/MOLST/MOST prepared for Provider review and signature      Follow-up plan:    [] Schedule follow-up conversation to continue planning  [] Referred individual to Provider for additional questions/concerns   [] Advised patient/agent/surrogate to review completed ACP document and update if needed with changes in condition, patient preferences or care setting    [] This note routed to one or more involved healthcare providers    #[phone]  Electronically signed by Nahid Edwards RN on 9/6/2022 at 11:09 AM

## 2022-09-06 NOTE — CARE COORDINATION
INITIAL CASE MANAGEMENT ASSESSMENT    Met with patient to assess possible discharge needs. Explained Case Management role/services. 09/06/22 1109   Service Assessment   Patient Orientation Alert and Oriented;Person   Cognition Alert  (confusion)   Primary Caregiver Other (Comment)  (LTC at Home at [facility])   Dominic Null Children;Family Members   [address] [facility] is: Legal Next of Neville Garcia   PCP Verified by CM Yes  (provided by facility)   Prior Functional Level Assistance with the following:;Independent in ADLs/IADLs   Current Functional Level Independent in ADLs/IADLs   Can patient return to prior living arrangement Yes   Ability to make needs known: Good   Family able to assist with home care needs: Yes   Would you like for me to discuss the discharge plan with any other family members/significant others, and if so, who? No   Social/Functional History   Lives With Alone   Type of Home Facility   ADL Assistance Independent   Ambulation Assistance Independent   Transfer Assistance Independent     PT/OT Recs: Patient could benefit from further therapy. Facility to obtain part B therapies. PLAN/COMMENTS: Plan to return [address] to Home at [facility]. Patient could bene    Patient came from Home at [facility] prior to arrival.  Call to Michell at [phone] who confirmed the patient is:  [x] [address], no Precert required for return.  [] [facility] will be required for return. [] Skilled Nursing Care, no Precert required for return. [] [address] required for return.    [] Is fully vaccinated for Covid. [x] Has started Covid vaccination, but is not complete. [] Is not vaccinated for Covid. [] No Covid Test needed for return. [x] Rapid Covid test needed before return within: [x] 48 hours, [] 72 hours, [] 5 days, [] other   [] PCR Covid test needed before return.     [x] Confirmed with the patient or family that the plan is to

## 2022-09-06 NOTE — CARE COORDINATION
Patient to discharge back to Home at [facility] by [facility] [place] [facility] at 3:15. Attempted to notify Loc flores son, voicemail is full. Juan Villagran daughter with no answer. Notified Tim Lebron at [facility] and patient of transport back to facility.     #[phone]  Electronically signed by Peter Davis RN on 9/6/2022 at 11:19 AM

## 2022-09-06 NOTE — CARE COORDINATION
CASE MANAGEMENT DISCHARGE SUMMARY:    DISCHARGE DATE: 9/6/22    Discharging to Facility/ Agency   Name: [facility] at [address] [place] [address],[address]:  [address] [facility], Patricia Rajendra [address]  Phone:  [phone]  Fax:  [phone]: [address]: 3:15pm     INSURANCE [address] OBTAINED: N/A    JACK/PASALTHEA COMPLETED: N/A    #[phone]  Electronically signed by Amado Villa RN on 9/6/2022 at 11:14 AM

## 2022-09-06 NOTE — PROGRESS NOTES
Patient discharging, IV removed, catheter removed intact, dressing applied. Called Home at [facility] and gave Fenton March report on patient. Discharge paperwork sent with EMS/parametrics. Son, Cornel Canavan, called; no answer, left message to return call. Patient's belongings, shoes and sweat pants, sent with patient.

## 2022-09-06 NOTE — PROGRESS NOTES
Hospital Medicine Progress Note     Date:  9/6/2022    PCP: No primary care provider on file. (Tel: None)    Date of Admission: 9/4/2022    Chief complaint:   Chief Complaint   Patient presents with    Chest Pain     States he was having chest pain around 1830. Patient was given 3 nitro by nursing home and 324 of asprin by EMS. EMS states they found him in a wheelchair. Pain is 9/10 pressure. Glucose 79       Brief admission history: 69-year-old male with history of dementia, CVA, COPD, hyperlipidemia, essential hypertension, osteoarthritis, BPH, who presents to the emergency room with complaints of reproducible left-sided chest pain that has been ongoing for the past 1 to 2 weeks. He had normal serial troponin. Unremarkable EKG. He had a recent abnormal stress test on 8/2/2022 with small area of reversible defect in the apex and apical inferior segment. Because of this, cardiology was consulted to assist with evaluation. Per cardiologist, this area of defect is not clinically relevant to patient's presentation. Patient had reproducible chest wall tenderness on exam.  He also had normal D-dimer on presentation. Etiology of chest pain is not to be musculoskeletal.  Patient is then discharged in stable condition. Assessment/plan:  Musculoskeletal chest pain. Patient had negative serial troponin, EKG. Evaluated by cardiologist, deemed noncardiac etiology of chest pain. Patient advised to take Tylenol as needed for pain. He is stable for discharge back to SNF. Other comorbidities: history of dementia, CVA, COPD, hyperlipidemia, essential hypertension, osteoarthritis, BPH. Diet: ADULT DIET; Regular; Low Fat/Low Chol/High Fiber/DANE    Code status: Full Code   ----------  Subjective  Patient reports left-sided chest pain, ongoing for the past 1 to 2 weeks.     Objective  Physical exam:  Vitals: BP (!) 142/63   Pulse 62   Temp 98.1 °F (36.7 °C) (Oral)   Resp 14   Ht 5' 9\" (1.753 m)   Wt 153 lb 7 oz (69.6 kg)   SpO2 97%   BMI 22.66 kg/m²   Gen/overall appearance: Not in acute distress. Alert. Oriented x3. Head: Normocephalic, atraumatic  Eyes: EOMI, good acuity  ENT: Oral mucosa moist  Neck: No JVD, thyromegaly  CVS: Nml S1S2, no MRG, RRR  Pulm: Clear bilaterally. No crackles/wheezes  Chest: Reproducible left chest wall tenderness to palpation. Gastrointestinal: Soft, NT/ND, +BS  Musculoskeletal: No edema. Warm  Neuro: No focal deficit. Moves extremity spontaneously. Psychiatry: Appropriate affect. Not agitated. Skin: Warm, dry with normal turgor. No rash  Capillary refill: Brisk,< 3 seconds   Peripheral Pulses: +2 palpable, equal bilaterally      24HR INTAKE/OUTPUT:    Intake/Output Summary (Last 24 hours) at 9/6/2022 0922  Last data filed at 9/5/2022 1400  Gross per 24 hour   Intake 360 ml   Output --   Net 360 ml     I/O last 3 completed shifts: In: 400 [P.O.:390; I.V.:10]  Out: -   No intake/output data recorded.   Meds:    tamsulosin  0.4 mg Oral Daily    cetirizine  10 mg Oral Daily    lisinopril  10 mg Oral Daily    donepezil  10 mg Oral Nightly    vitamin D  5,000 Units Oral Weekly    atorvastatin  20 mg Oral Daily    atenolol  25 mg Oral Daily    aspirin  81 mg Oral Daily    sodium chloride flush  5-40 mL IntraVENous 2 times per day    enoxaparin  40 mg SubCUTAneous Daily    amLODIPine  10 mg Oral Daily    melatonin  6 mg Oral Nightly     Infusions:    sodium chloride       PRN Meds: famotidine, albuterol sulfate HFA, sodium chloride flush, sodium chloride, ondansetron **OR** ondansetron, polyethylene glycol, acetaminophen **OR** acetaminophen    Labs/imaging:  CBC:   Recent Labs     09/04/22 2123 09/06/22 0449   WBC 8.5 7.3   HGB 13.0* 12.8*    223     BMP:    Recent Labs     09/04/22 2123 09/06/22 0449    141   K 4.5  --    CL 99 104   CO2 29 26   BUN 15 17   CREATININE 1.2 1.2   GLUCOSE 94 85       Tono Villalobos MD  -------------------------------  Rounding

## 2022-09-08 NOTE — DISCHARGE SUMMARY
Hospital Discharge Summary    Patient's PCP: No primary care provider on file. Admit Date: 9/4/2022   Discharge Date: 9/6/2022    Admitting Physician: Dr. Hugh Lozada MD  Discharge Physician: Dr. Hugh Lozada MD     Consults:   IP CONSULT TO HOSPITALIST  IP CONSULT TO CARDIOLOGY    Brief HPI: Patient admitted with chest pain    Brief hospital course: 67-year-old male with history of dementia, CVA, COPD, hyperlipidemia, essential hypertension, osteoarthritis, BPH, who presents to the emergency room with complaints of reproducible left-sided chest pain that has been ongoing for the past 1 to 2 weeks. He had normal serial troponin. Unremarkable EKG. He had a recent abnormal stress test on 8/2/2022 with small area of reversible defect in the apex and apical inferior segment. Because of this, cardiology was consulted to assist with evaluation. Per cardiologist, this area of defect is not clinically relevant to patient's presentation. Patient had reproducible chest wall tenderness on exam.  He also had normal D-dimer on presentation. Etiology of chest pain is not to be musculoskeletal.  Patient is then discharged in stable condition. Assessment/plan:  Musculoskeletal chest pain. Patient had negative serial troponin, EKG. Evaluated by cardiologist, deemed noncardiac etiology of chest pain. Patient advised to take Tylenol as needed for pain. He is stable for discharge back to SNF. Other comorbidities: history of dementia, CVA, COPD, hyperlipidemia, essential hypertension, osteoarthritis, BPH. Invasive procedures:  None. Discharge Diagnoses:   See above. Physical Exam: /77   Pulse 54   Temp 98.5 °F (36.9 °C) (Oral)   Resp 18   Ht 5' 9\" (1.753 m)   Wt 153 lb 7 oz (69.6 kg)   SpO2 98%   BMI 22.66 kg/m²   Gen/overall appearance: Not in acute distress. Alert.  Oriented X3  Head: Normocephalic, atraumatic  Eyes: EOMI, good acuity  ENT: Oral mucosa moist  Neck: No JVD, thyromegaly  CVS: Nml S1S2, no MRG, RRR  Pulm: Clear bilaterally. No crackles/wheezes  Gastrointestinal: Soft, NT/ND, +BS  Musculoskeletal: No edema. Warm  Neuro: No focal deficit. Moves extremity spontaneously. Psychiatry: Appropriate affect. Not agitated. Skin: Warm, dry with normal turgor. No rash  Capillary refill: Brisk,< 3 seconds   Peripheral Pulses: +2 palpable, equal bilaterally     Significant diagnostic studies that may require follow up:  XR CHEST PORTABLE    Result Date: 9/5/2022  EXAMINATION: ONE XRAY VIEW OF THE CHEST 9/4/2022 9:14 pm COMPARISON: Radiograph 08/02/2022, CT chest 07/08/2022 HISTORY: ORDERING SYSTEM PROVIDED HISTORY: Chest pain TECHNOLOGIST PROVIDED HISTORY: Reason for exam:->Chest pain Reason for Exam: chest pain FINDINGS: The cardiomediastinal silhouette is unchanged. Partially calcified left upper lobe mass is similar to prior. 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 No new focal consolidation, pneumothorax, or pleural effusion. Osseous structures are grossly unchanged and diffusely demineralized. No acute cardiopulmonary findings. Treatments: As above. Discharge Medications:     Medication List        CONTINUE taking these medications      acetaminophen 325 MG tablet  Commonly known as: TYLENOL     albuterol sulfate  (90 Base) MCG/ACT inhaler  Commonly known as: PROVENTIL;VENTOLIN;PROAIR     amLODIPine 10 MG tablet  Commonly known as: NORVASC     aspirin 81 MG chewable tablet     atenolol 25 MG tablet  Commonly known as: TENORMIN     atorvastatin 20 MG tablet  Commonly known as: LIPITOR     donepezil 10 MG tablet  Commonly known as: ARICEPT     famotidine 20 MG tablet  Commonly known as: PEPCID     lisinopril 10 MG tablet  Commonly known as: PRINIVIL;ZESTRIL     loratadine 10 MG tablet  Commonly known as: CLARITIN     melatonin 5 MG Tabs tablet     nitroGLYCERIN 0.4 MG SL tablet  Commonly known as: NITROSTAT  up to max of 3 total doses.  If no relief after 1 dose, call 911.     tamsulosin 0.4 MG capsule  Commonly known as: FLOMAX     Vitamin D3 125 MCG (5000 UT) Tabs            STOP taking these medications      ondansetron 4 MG tablet  Commonly known as: ZOFRAN              Activity: activity as tolerated  Diet: Regular diet    Disposition: home  Discharged Condition: Stable  Follow Up: Matthew Jiménez, [address] [facility]  [address] [facility] [place] [address]  [phone]    Schedule an appointment as soon as possible for a visit in 1 week(s)      [facility] at [address] [place] [address]  [phone]          Code status:  Full     Total time spent on discharge, finalizing medications, referrals and arranging outpatient follow up was more than 30 minutes      Thank you Dr. Kim primary care provider on file. for the opportunity to be involved in this patients care.

## 2022-09-09 NOTE — DISCHARGE SUMMARY
[facility]                     [facility], [address]                               DISCHARGE SUMMARY    PATIENT NAME: Nguyễn Estrada                   :        1940  MED REC NO:   [id number]                          ROOM:       2838  ACCOUNT NO:   [de-identified]                           ADMIT DATE: 2022  PROVIDER:     Gucci Quezada MD                  DISCHARGE DATE:  2022    FINAL DIAGNOSES:  1. Unstable angina. 2.  Atherosclerotic heart disease. 3.  Dementia. 4.  Hypertension. DISCHARGE MEDICATIONS:  1. Tramadol 50 every 6 hours p.r.n.  2.  Nitrostat sublingual p.r.n.  3.  Valtrex 1 gm one tablet in the morning and one tablet before bedtime  for 5 days. 4.  Albuterol sulfate two puffs every 4 hours p.r.n.  5.  Aspirin 81 mg once a day. 6.  Claritin 10 mg once a day. 7.  Pepcid 20 mg nightly. 8.  Flomax 0.4 mg nightly. 9.  Melatonin 5 mg nightly. 10.  Tylenol 650 q. 6h. p.r.n. 11.  Vitamin D3 at 5000 units every day. 12.  Zofran orally disintegrating tablet 4 mg every 8 hours p.r.n. 13.  Atenolol 25 mg daily. 14.  Aricept 10 mg once a day. 15.  Lisinopril 10 mg once a day. 16.  Lipitor 40 mg once a day. HOSPITAL COURSE:  This 75-year-old black American gentleman came to the  emergency room from [facility] with an intermittent  chest pain lasting for few months. The patient was only oriented x1. Blood pressure was 138/72, temperature 97.7, respirations 16, heart rate  59. Lab eval showed sodium 139, potassium 4.2, chloride 101, CO2 of 34,  BUN 12, creatinine 1.2. Anion gap was 4, GFR 58. ProBNP level 208. Two sets of troponin were negative. Bilirubin was 0.7. White blood  cell count 9, hemoglobin/hematocrit was 14 and 41.3. Chest x-ray shows  patchy right basilar airspace disease due to atelectasis.   Nuclear  medicine myocardial perfusion stress test was done and the patient  developed symptoms likely related to [place]. There were  stress-induced shortness of breath, dizziness and cramp symptoms;  resolved with aminophylline. No significant rhythm abnormality. Myocardial perfusion test was abnormal with reversible ischemia. The  patient was referred to Cardiology. Dr. Jd Hanks saw the  patient. Mild intensity defect on the nuclear medicine. Pharmacologic  stress test was in question, which could be due to mild ischemia or  vasospasm. At this time, no invasive cardiology workup was recommended  and Dr. Walter Key offered to follow up the patient as an outpatient. Louise Gonzales made necessary arrangement for the patient to go back to  home at [facility]. The patient discharged in stable condition with  medical management.         Mary Kay Trejo MD    D: 09/08/2022 9:05:34       T: 09/08/2022 9:07:53     [email or web address]  Job#: [id number]     Doc#: [id number]    CC:

## 2022-11-16 NOTE — PROGRESS NOTES
08/02/22 1718   RT Protocol   History Pulmonary Disease 1   Respiratory pattern 0   Breath sounds 2   Cough 0   Bronchodilator Assessment Score 3
4 Eyes Skin Assessment     NAME:  Vasile Choudhury  YOB: 1940  MEDICAL RECORD NUMBER:  [id number]    The patient is being assess for  Admission    I agree that 2 RN's have performed a thorough Head to Toe Skin Assessment on the patient. ALL assessment sites listed below have been assessed. Areas assessed by both nurses:    Head, Face, Ears, Shoulders, Back, Chest, Arms, Elbows, Hands, Sacrum. Buttock, Coccyx, Ischium, and Legs. Feet and Heels        Does the Patient have a Wound? Other : Pt has x5 clusters of raised red bumps/blisters. Four are located on upper LLE. One cluster is located on lower back, above coccyx. Clusters are painful to the touch per pt.         [place] Prevention initiated:  No   Wound Care Orders initiated:  No    Pressure Injury (Stage 3,4, Unstageable, DTI, NWPT, and Complex wounds) if present place referral/consult order under [de-identified] No    New and Established Ostomies if present place consult order under : NA      Nurse 1 eSignature: Electronically signed by Dali Lawler RN on 8/2/22 at 6:37 PM EDT    **SHARE this note so that the co-signing nurse is able to place an eSignature**    Nurse 2 eSignature: Electronically signed by Qian Parisi RN on 8/2/22 at 6:47 PM EDT
Data- discharge order received, pt verbalized agreement to discharge, disposition to [facility] at [facility], [address] [place] reviewed and signed by physician. Action- AVS prepared, CHE completed/ reported faxed by case management/. Discharge instruction summary: Diet- regular, Activity- wheelchair bound, immunizations reviewed and updated, medications prescriptions to be filled at receiving facility except for the controlled prescriptions to be sent: n/a., Transfer code status: Full Code, LDAs to remain with discharge: none. DME used: none. Response- Bedside RN to call report to receiving facility. Pt belongings gathered, peripheral IV and cardiac monitoring removed. Disposition to Discharged via cart/stretcher and via ambulance to skilled nursing by EMS transportation, no complications reported. 1. WEIGHT: Admit Weight: 157 lb 13.6 oz (71.6 kg) (08/02/22 1730)        Today  Weight: 158 lb 11.7 oz (72 kg) (08/04/22 0007)       2.  O2 SAT.: SpO2: 96 % (08/04/22 1130)
Patient Active Problem List   Diagnosis    Chest pain    Unstable angina (HCC)    Primary hypertension    Dementia ([facility] [address]   H&P dictated
Patient seen , discharge dictated scripts given , arrangements made , CHE completed .  Discussed with nursing staff  And   If applicable ,  Discussed with  Patient's family , all questions answered and concerns addressed  When applicable
Patient seen in ED, room 23. Admission completed except for: Rights and Responsibilities, orientation to room, care plan, education, white board, height and weight, pain assessment and head to toe assessment. Patient is alert and oriented to person, place and situation. [facility] and is being admitted for intermittent chest pain. He states that he takes no medications, but a medication list did accompany him from the facility. All questions answered. Patient is from the long term care facility and all answers were provided by the patient and should be verified for accuracy.
RN call pt's son and updated him on the plan of care. RN was also able to get consent over the phone for the [facility]. Second Rn signed consent with this RN.
RN gave report to Anais Snider RN at Home at [facility].
Rounded on pt. Pt's IV was pulled out on the floor. Pt is a difficult stick. Unable to place new PIV. Call placed to DIT.
5

## 2023-02-07 ENCOUNTER — HOSPITAL ENCOUNTER (EMERGENCY)
Age: 83
Discharge: HOME | End: 2023-02-08
Payer: MEDICARE

## 2023-02-07 ENCOUNTER — APPOINTMENT (OUTPATIENT)
Dept: CT IMAGING | Age: 83
End: 2023-02-07
Payer: MEDICARE

## 2023-02-07 DIAGNOSIS — N30.01 ACUTE CYSTITIS WITH HEMATURIA: Primary | ICD-10-CM

## 2023-02-07 DIAGNOSIS — R10.30 LOWER ABDOMINAL PAIN: ICD-10-CM

## 2023-02-07 LAB
A/G RATIO: 0.9 (ref 1.1–2.2)
ALBUMIN SERPL-MCNC: 3.6 G/DL (ref 3.4–5)
ALP BLD-CCNC: 134 U/L (ref 40–129)
ALT SERPL-CCNC: 28 U/L (ref 10–40)
ANION GAP SERPL CALCULATED.3IONS-SCNC: 8 MMOL/L (ref 3–16)
AST SERPL-CCNC: 31 U/L (ref 15–37)
BACTERIA: ABNORMAL /HPF
BASOPHILS ABSOLUTE: 0.1 K/UL (ref 0–0.2)
BASOPHILS RELATIVE PERCENT: 0.6 %
BILIRUB SERPL-MCNC: 0.4 MG/DL (ref 0–1)
BILIRUBIN URINE: NEGATIVE
BLOOD, URINE: ABNORMAL
BUN BLDV-MCNC: 14 MG/DL (ref 7–20)
CALCIUM SERPL-MCNC: 9.4 MG/DL (ref 8.3–10.6)
CHLORIDE BLD-SCNC: 102 MMOL/L (ref 99–110)
CLARITY: ABNORMAL
CO2: 31 MMOL/L (ref 21–32)
COLOR: YELLOW
CREAT SERPL-MCNC: 1 MG/DL (ref 0.8–1.3)
EOSINOPHILS ABSOLUTE: 0.2 K/UL (ref 0–0.6)
EOSINOPHILS RELATIVE PERCENT: 2.1 %
EPITHELIAL CELLS, UA: 1 /HPF (ref 0–5)
GFR SERPL CREATININE-BSD FRML MDRD: >60 ML/MIN/{1.73_M2}
GLUCOSE BLD-MCNC: 87 MG/DL (ref 70–99)
GLUCOSE URINE: NEGATIVE MG/DL
HCT VFR BLD CALC: 35.4 % (ref 40.5–52.5)
HEMOGLOBIN: 11.8 G/DL (ref 13.5–17.5)
HYALINE CASTS: 2 /LPF (ref 0–8)
KETONES, URINE: ABNORMAL MG/DL
LACTIC ACID, SEPSIS: 1.5 MMOL/L (ref 0.4–1.9)
LEUKOCYTE ESTERASE, URINE: ABNORMAL
LIPASE: 37 U/L (ref 13–60)
LYMPHOCYTES ABSOLUTE: 3.8 K/UL (ref 1–5.1)
LYMPHOCYTES RELATIVE PERCENT: 38.1 %
MCH RBC QN AUTO: 31.6 PG (ref 26–34)
MCHC RBC AUTO-ENTMCNC: 33.4 G/DL (ref 31–36)
MCV RBC AUTO: 94.6 FL (ref 80–100)
MICROSCOPIC EXAMINATION: YES
MONOCYTES ABSOLUTE: 0.7 K/UL (ref 0–1.3)
MONOCYTES RELATIVE PERCENT: 7.4 %
NEUTROPHILS ABSOLUTE: 5.2 K/UL (ref 1.7–7.7)
NEUTROPHILS RELATIVE PERCENT: 51.8 %
NITRITE, URINE: NEGATIVE
PDW BLD-RTO: 14.3 % (ref 12.4–15.4)
PH UA: 5.5 (ref 5–8)
PLATELET # BLD: 302 K/UL (ref 135–450)
PMV BLD AUTO: 9.8 FL (ref 5–10.5)
POTASSIUM SERPL-SCNC: 4.4 MMOL/L (ref 3.5–5.1)
PROTEIN UA: 30 MG/DL
RBC # BLD: 3.74 M/UL (ref 4.2–5.9)
RBC UA: 8 /HPF (ref 0–4)
SODIUM BLD-SCNC: 141 MMOL/L (ref 136–145)
SPECIFIC GRAVITY UA: 1.02 (ref 1–1.03)
TOTAL PROTEIN: 7.7 G/DL (ref 6.4–8.2)
TROPONIN: <0.01 NG/ML
URINE REFLEX TO CULTURE: YES
URINE TYPE: ABNORMAL
UROBILINOGEN, URINE: 1 E.U./DL
WBC # BLD: 10.1 K/UL (ref 4–11)
WBC UA: 201 /HPF (ref 0–5)

## 2023-02-07 PROCEDURE — 81001 URINALYSIS AUTO W/SCOPE: CPT

## 2023-02-07 PROCEDURE — 85025 COMPLETE CBC W/AUTO DIFF WBC: CPT

## 2023-02-07 PROCEDURE — 87186 SC STD MICRODIL/AGAR DIL: CPT

## 2023-02-07 PROCEDURE — 99284 EMERGENCY DEPT VISIT MOD MDM: CPT

## 2023-02-07 PROCEDURE — 84484 ASSAY OF TROPONIN QUANT: CPT

## 2023-02-07 PROCEDURE — 80053 COMPREHEN METABOLIC PANEL: CPT

## 2023-02-07 PROCEDURE — 93005 ELECTROCARDIOGRAM TRACING: CPT | Performed by: PHYSICIAN ASSISTANT

## 2023-02-07 PROCEDURE — 83690 ASSAY OF LIPASE: CPT

## 2023-02-07 PROCEDURE — 6370000000 HC RX 637 (ALT 250 FOR IP): Performed by: PHYSICIAN ASSISTANT

## 2023-02-07 PROCEDURE — 87077 CULTURE AEROBIC IDENTIFY: CPT

## 2023-02-07 PROCEDURE — 83605 ASSAY OF LACTIC ACID: CPT

## 2023-02-07 PROCEDURE — 74176 CT ABD & PELVIS W/O CONTRAST: CPT

## 2023-02-07 PROCEDURE — 87086 URINE CULTURE/COLONY COUNT: CPT

## 2023-02-07 RX ORDER — CEFUROXIME AXETIL 250 MG/1
250 TABLET ORAL 2 TIMES DAILY
Qty: 14 TABLET | Refills: 0 | Status: SHIPPED | OUTPATIENT
Start: 2023-02-07 | End: 2023-02-14

## 2023-02-07 RX ORDER — CEFUROXIME AXETIL 250 MG/1
250 TABLET ORAL ONCE
Status: COMPLETED | OUTPATIENT
Start: 2023-02-08 | End: 2023-02-07

## 2023-02-07 RX ADMIN — CEFUROXIME AXETIL 250 MG: 250 TABLET ORAL at 23:54

## 2023-02-07 ASSESSMENT — PAIN DESCRIPTION - FREQUENCY: FREQUENCY: CONTINUOUS

## 2023-02-07 ASSESSMENT — PAIN DESCRIPTION - DESCRIPTORS: DESCRIPTORS: ACHING;SHARP

## 2023-02-07 ASSESSMENT — LIFESTYLE VARIABLES
HOW OFTEN DO YOU HAVE A DRINK CONTAINING ALCOHOL: NEVER
HOW MANY STANDARD DRINKS CONTAINING ALCOHOL DO YOU HAVE ON A TYPICAL DAY: PATIENT DOES NOT DRINK

## 2023-02-07 ASSESSMENT — ENCOUNTER SYMPTOMS
RHINORRHEA: 0
NAUSEA: 0
COUGH: 0
VOMITING: 0
SHORTNESS OF BREATH: 0
ABDOMINAL PAIN: 1
DIARRHEA: 0

## 2023-02-07 ASSESSMENT — PAIN - FUNCTIONAL ASSESSMENT: PAIN_FUNCTIONAL_ASSESSMENT: 0-10

## 2023-02-07 ASSESSMENT — PAIN DESCRIPTION - LOCATION: LOCATION: ABDOMEN

## 2023-02-07 ASSESSMENT — PAIN DESCRIPTION - PAIN TYPE: TYPE: ACUTE PAIN

## 2023-02-07 ASSESSMENT — PAIN DESCRIPTION - ORIENTATION: ORIENTATION: LEFT;LOWER;OUTER

## 2023-02-07 ASSESSMENT — PAIN SCALES - GENERAL: PAINLEVEL_OUTOF10: 3

## 2023-02-08 VITALS
HEART RATE: 55 BPM | WEIGHT: 150 LBS | SYSTOLIC BLOOD PRESSURE: 143 MMHG | TEMPERATURE: 98.5 F | HEIGHT: 70 IN | BODY MASS INDEX: 21.47 KG/M2 | RESPIRATION RATE: 13 BRPM | OXYGEN SATURATION: 96 % | DIASTOLIC BLOOD PRESSURE: 58 MMHG

## 2023-02-08 LAB
EKG ATRIAL RATE: 59 BPM
EKG DIAGNOSIS: NORMAL
EKG P AXIS: 70 DEGREES
EKG P-R INTERVAL: 156 MS
EKG Q-T INTERVAL: 462 MS
EKG QRS DURATION: 122 MS
EKG QTC CALCULATION (BAZETT): 457 MS
EKG R AXIS: -2 DEGREES
EKG T AXIS: 19 DEGREES
EKG VENTRICULAR RATE: 59 BPM

## 2023-02-08 PROCEDURE — 93010 ELECTROCARDIOGRAM REPORT: CPT | Performed by: INTERNAL MEDICINE

## 2023-02-08 NOTE — ED NOTES
Medical transport here to take patient back to [facility]     Marvin Israel RN  02/08/23 7683

## 2023-02-08 NOTE — ED NOTES
IV infiltrated, attempted to stick patient another 2 times without success. CT tech also attempted IV start x2 unsuccessful.      Maged Navarro RN  02/07/23 7312

## 2023-02-08 NOTE — ED TRIAGE NOTES
Patient lives at [address] at [facility]. Started to c/o to staff about abdominal pain around 1800, they told EMS that they thought he was talking about his chest. 3 nitros given with no relief. On arrival to ED, pt c/o LLQ abdominal pain with movement. Denies pain when laying in bed. Denies any changes in bowel movements or urination. However patient does have a listed history of dementia and is talking about \"visiting [place] at his home last Sunday\" while he sees President Bryson on the TV in the room. VSS. On cardiac monitor, in NSR at 70. IV started in R forearm and blood drawn. Patient identified at high risk for falls per [facility] scale. [place] fall precaution initiated. Instructed to call for any assistance with any ambulation. Siderails up. Stretcher in low position and locked. Call light within reach. Arm band applied, yellow stretcher flag and yellow socks in use. Bed alarm on.

## 2023-02-08 NOTE — ED NOTES
Report called to [facility]. Patient transport set up for 0130. Paperwork completed with copy of DNR for transport. Son called and updated on plan of care.      Scottie Stewart RN  02/08/23 0757

## 2023-02-08 NOTE — ED PROVIDER NOTES
[address] [facility]        Pt Name: Houston Rose  MRN: [id number]  [place] 1940  Date of evaluation: 2/7/2023  Provider: Jack Pollard PA-C  PCP: No primary care provider on file. Note Started: 10:17 PM EST 2/7/23      SUJEY. I have evaluated this patient. My supervising physician was available for consultation. CHIEF COMPLAINT       Chief Complaint   Patient presents with    Abdominal Pain     LLQ, started this evening around 1800. Nursing Home thought that patient was complaining of chest pain and told the EMS that they gave the patient 3 nitro with no relief in pain. HISTORY OF PRESENT ILLNESS: 1 or more Elements     History From: Patient, EMS  Limitations to history : History of dementia    Houston Rose is a 78 y.o. male who presents to the emergency department today for evaluation for abdominal pain. Patient is from the nursing home facility. When he arrived to the ED he is alert and oriented times person. This is his baseline. The patient apparently was complaining of pain to his lower abdomen earlier tonight, and EMS initially was told that he was having chest pain. He did receive nitro in route, and EMS then realized that patient was complaining of pain only to the abdomen. Per EMS the patient is never complained of chest pain for them and the patient has no chest pain to for me. No reports of any shortness of breath. No fever, cough, vomiting or diarrhea there history is able to be obtained at this time    Nursing Notes were all reviewed and agreed with or any disagreements were addressed in the HPI. REVIEW OF SYSTEMS :      Review of Systems   Constitutional:  Negative for activity change, appetite change, chills and fever. HENT:  Negative for congestion and rhinorrhea. Respiratory:  Negative for cough and shortness of breath. Cardiovascular:  Negative for chest pain.    Gastrointestinal:  Positive for abdominal pain. Negative for diarrhea, nausea and vomiting. Genitourinary:  Negative for difficulty urinating, dysuria and hematuria. Positives and Pertinent negatives as per HPI. SURGICAL HISTORY     Past Surgical History:   Procedure Laterality Date    LUNG REMOVAL, PARTIAL Right     noted on CT scan    TONSILLECTOMY         CURRENTMEDICATIONS       Previous Medications    ACETAMINOPHEN (TYLENOL) 325 MG TABLET    Take 325 mg by mouth every 6 hours as needed for Pain    ALBUTEROL SULFATE HFA (PROVENTIL;VENTOLIN;PROAIR) 108 (90 BASE) MCG/ACT INHALER    Inhale 2 puffs into the lungs every 4 hours as needed for Wheezing    AMLODIPINE (NORVASC) 10 MG TABLET    Take 1 tablet by mouth daily    ASPIRIN 81 MG CHEWABLE TABLET    Take 81 mg by mouth in the morning. ATENOLOL (TENORMIN) 25 MG TABLET        ATORVASTATIN (LIPITOR) 20 MG TABLET    Take 1 tablet by mouth daily    CHOLECALCIFEROL (VITAMIN D3) 125 MCG (5000 UT) TABS    Take 1 tablet by mouth in the morning. DONEPEZIL (ARICEPT) 10 MG TABLET        FAMOTIDINE (PEPCID) 20 MG TABLET    Take 20 mg by mouth nightly as needed    LISINOPRIL (PRINIVIL;ZESTRIL) 10 MG TABLET        LORATADINE (CLARITIN) 10 MG TABLET    Take 10 mg by mouth in the morning. MELATONIN 5 MG TABS TABLET    Take 5 mg by mouth nightly    NITROGLYCERIN (NITROSTAT) 0.4 MG SL TABLET    up to max of 3 total doses. If no relief after 1 dose, call 911. TAMSULOSIN (FLOMAX) 0.4 MG CAPSULE    Take 0.4 mg by mouth in the morning. ALLERGIES     Patient has no known allergies.     FAMILYHISTORY       Family History   Problem Relation Age of Onset    No Known Problems Mother     No Known Problems Father         SOCIAL HISTORY       Social History     Tobacco Use    Smoking status: Former     Packs/day: 0.25     Years: 3.00     Pack years: 0.75     Types: Cigarettes     Quit date: 1987     Years since quittin.0    Smokeless tobacco: Never   Vaping Use    Vaping Use: Never used Substance Use Topics    Alcohol use: Never    Drug use: Never       SCREENINGS        Miguel A Coma Scale  Eye Opening: Spontaneous  Best Verbal Response: Oriented  Best Motor Response: Obeys commands  [place] Coma Scale Score: 15                CIWA Assessment  BP: 136/63  Heart Rate: 61           PHYSICAL EXAM  1 or more Elements     ED Triage Vitals [02/07/23 2157]   BP Temp Temp Source Heart Rate Resp SpO2 Height Weight   (!) 149/67 98.5 °F (36.9 °C) Oral 63 14 95 % 5' 10\" (1.778 m) 150 lb (68 kg)       Physical Exam  Vitals and nursing note reviewed. Constitutional:       Appearance: He is well-developed. He is not diaphoretic. HENT:      Head: Normocephalic and atraumatic. Right Ear: External ear normal.      Left Ear: External ear normal.      Nose: Nose normal.   Eyes:      General:         Right eye: No discharge. Left eye: No discharge. Neck:      Trachea: No tracheal deviation. Cardiovascular:      Rate and Rhythm: Normal rate and regular rhythm. Pulmonary:      Effort: Pulmonary effort is normal. No respiratory distress. Breath sounds: Normal breath sounds. No wheezing or rales. Abdominal:      General: Bowel sounds are normal. There is distension. Palpations: Abdomen is soft. Tenderness: There is abdominal tenderness in the suprapubic area. There is no guarding. Comments: There is tenderness, minimal distention noted of the suprapubic abdomen,   Musculoskeletal:         General: Normal range of motion. Cervical back: Normal range of motion and neck supple. Skin:     General: Skin is warm and dry. Neurological:      General: No focal deficit present. Mental Status: He is alert. Mental status is at baseline.    Psychiatric:         Behavior: Behavior normal.           DIAGNOSTIC RESULTS   LABS:    Labs Reviewed   CBC WITH AUTO DIFFERENTIAL - Abnormal; Notable for the following components:       Result Value    RBC 3.74 (*)     Hemoglobin 11.8 (*) Hematocrit 35.4 (*)     All other components within normal limits   COMPREHENSIVE METABOLIC PANEL - Abnormal; Notable for the following components:    Albumin/Globulin Ratio 0.9 (*)     Alkaline Phosphatase 134 (*)     All other components within normal limits   URINALYSIS WITH REFLEX TO CULTURE - Abnormal; Notable for the following components:    Clarity, UA CLOUDY (*)     Ketones, Urine TRACE (*)     Blood, Urine TRACE (*)     Protein, UA 30 (*)     Leukocyte Esterase, Urine LARGE (*)     All other components within normal limits   MICROSCOPIC URINALYSIS - Abnormal; Notable for the following components:    Bacteria, UA 4+ (*)     WBC,  (*)     RBC, UA 8 (*)     All other components within normal limits   CULTURE, URINE   LIPASE   LACTATE, SEPSIS   TROPONIN   LACTATE, SEPSIS       When ordered only abnormal lab results are displayed. All other labs were within normal range or not returned as of this dictation. EKG: When ordered, EKG's are interpreted by the Emergency Department Physician in the absence of a cardiologist.  Please see their note for interpretation of EKG. RADIOLOGY:   Non-plain film images such as CT, Ultrasound and MRI are read by the radiologist. Plain radiographic images are visualized and preliminarily interpreted by the ED Provider with the below findings:        Interpretation per the Radiologist below, if available at the time of this note:    CT ABDOMEN PELVIS WO CONTRAST Additional Contrast? None   Final Result   No acute finding in the abdomen or pelvis. Mild sigmoid colon diverticulosis with no evidence of acute diverticulitis. There is a small fat containing left inguinal hernia. No evidence of   incarceration. Linear and nodular opacity in the posterior basal segment of the left lower   lobe increasing in size/length from 07/08/2022. In light of the history of a   primary tumor of the left lower lobe, recurrent/metastatic tumor must be   considered.   A follow-up CT chest to compare with 07/08/2022 is recommended. No results found. No results found. PROCEDURES   Unless otherwise noted below, none     Procedures    CRITICAL CARE TIME (.cctime)       PAST MEDICAL HISTORY      has a past medical history of Acute prostatitis (11/02/2021), Benign prostatic hyperplasia without lower urinary tract symptoms, Carpal tunnel syndrome, Cerebral infarction [facility]), Cognitive communication deficit, COVID-19 (12/30/2021), Emphysema, unspecified ([address] Hyperlipidemia, Hypertension, Muscle weakness, Neuralgia and neuritis, Nicotine dependence, Polyosteoarthritis, Radiculopathy, TIA (transient ischemic attack), and Urge incontinence. EMERGENCY DEPARTMENT COURSE and DIFFERENTIAL DIAGNOSIS/MDM:   Vitals:    Vitals:    02/07/23 2215 02/07/23 2245 02/07/23 2300 02/07/23 2315   BP: (!) 149/67 (!) 145/66 (!) 137/56 136/63   Pulse: 68 62 59 61   Resp: 19 16 13 15   Temp:       TempSrc:       SpO2:  98%     Weight:       Height:           Patient was given the following medications:  Medications   cefUROXime (CEFTIN) tablet 250 mg (250 mg Oral Given 2/7/23 1644)             Is this patient to be included in the SEP-1 Core Measure due to severe sepsis or septic shock? No   Exclusion criteria - the patient is NOT to be included for SEP-1 Core Measure due to:  2+ SIRS criteria are not met    Chronic Conditions affecting care:  has a past medical history of Acute prostatitis (11/02/2021), Benign prostatic hyperplasia without lower urinary tract symptoms, Carpal tunnel syndrome, Cerebral infarction [facility]), Cognitive communication deficit, COVID-19 (12/30/2021), Emphysema, unspecified ([address] Hyperlipidemia, Hypertension, Muscle weakness, Neuralgia and neuritis, Nicotine dependence, Polyosteoarthritis, Radiculopathy, TIA (transient ischemic attack), and Urge incontinence.     CONSULTS: (Who and What was discussed)  None      Social Determinants Significantly Affecting Health : None    Records Reviewed (External and Source) previous emergency room records, admission record    CC/HPI Summary, DDx, ED Course, and Reassessment: Briefly, this is an 78-year-old male, history of dementia, who presents to the emergency department today for complaints of lower abdominal pain. EMS reports that they initially thought that they were bringing the patient in for chest pain, however patient has been only complaining of abdominal pain to them. Patient denies any chest pain to me. On examination, his vital signs are stable. He does have some distention, tenderness noted over the suprapubic abdomen. Disposition Considerations (tests considered but not done, Admit vs D/C, Shared Decision Making, Pt Expectation of Test or Tx.):    The bladder scan is broken in the emergency room, I am concerned for some urinary retention, the patient initially had a Brown catheter placed, and several 100 mL were drained. Urine does show infection, with large leukocytes and 201 white blood cells. No other further urine was drained after the 700 mL's, patient otherwise does not have any retention, and otherwise she does not need the Brown catheter replaced, this will be discontinued    CBC shows no evidence of leukocytosis or anemia. CMP is unremarkable. Lipase is normal.  Lactic acid is normal    She has no acute process. Patient never complained of any chest pain, however due to his dementia, EKG and troponin were obtained, and are negative. I feel that clinically the symptoms today are likely due to acute cystitis, will treat with Ceftin for home, he was given a first dose here. He is to obtain follow-up this primary care physician within 2 to 3 days. He is to return to the ED for any new or worsening symptoms. Is otherwise stable for discharge and will be transferred to the nursing home facility in stable condition.    Results for orders placed or performed during the hospital encounter of 02/07/23 CBC with Auto Differential   Result Value Ref Range    WBC 10.1 4.0 - 11.0 K/uL    RBC 3.74 (L) 4.20 - 5.90 M/uL    Hemoglobin 11.8 (L) 13.5 - 17.5 g/dL    Hematocrit 35.4 (L) 40.5 - 52.5 %    MCV 94.6 80.0 - 100.0 fL    MCH 31.6 26.0 - 34.0 pg    MCHC 33.4 31.0 - 36.0 g/dL    RDW 14.3 12.4 - 15.4 %    Platelets 247 635 - 632 K/uL    MPV 9.8 5.0 - 10.5 fL    Neutrophils % 51.8 %    Lymphocytes % 38.1 %    Monocytes % 7.4 %    Eosinophils % 2.1 %    Basophils % 0.6 %    Neutrophils Absolute 5.2 1.7 - 7.7 K/uL    Lymphocytes Absolute 3.8 1.0 - 5.1 K/uL    Monocytes Absolute 0.7 0.0 - 1.3 K/uL    Eosinophils Absolute 0.2 0.0 - 0.6 K/uL    Basophils Absolute 0.1 0.0 - 0.2 K/uL   Comprehensive Metabolic Panel   Result Value Ref Range    Sodium 141 136 - 145 mmol/L    Potassium 4.4 3.5 - 5.1 mmol/L    Chloride 102 99 - 110 mmol/L    CO2 31 21 - 32 mmol/L    Anion Gap 8 3 - 16    Glucose 87 70 - 99 mg/dL    BUN 14 7 - 20 mg/dL    Creatinine 1.0 0.8 - 1.3 mg/dL    Est, Glom Filt Rate >60 >60    Calcium 9.4 8.3 - 10.6 mg/dL    Total Protein 7.7 6.4 - 8.2 g/dL    Albumin 3.6 3.4 - 5.0 g/dL    Albumin/Globulin Ratio 0.9 (L) 1.1 - 2.2    Total Bilirubin 0.4 0.0 - 1.0 mg/dL    Alkaline Phosphatase 134 (H) 40 - 129 U/L    ALT 28 10 - 40 U/L    AST 31 15 - 37 U/L   Lipase   Result Value Ref Range    Lipase 37.0 13.0 - 60.0 U/L   Urinalysis with Reflex to Culture    Specimen: Urine   Result Value Ref Range    Color, UA Yellow Straw/Yellow    Clarity, UA CLOUDY (A) Clear    Glucose, Ur Negative Negative mg/dL    Bilirubin Urine Negative Negative    Ketones, Urine TRACE (A) Negative mg/dL    Specific Gravity, UA 1.020 1.005 - 1.030    Blood, Urine TRACE (A) Negative    pH, UA 5.5 5.0 - 8.0    Protein, UA 30 (A) Negative mg/dL    Urobilinogen, Urine 1.0 <2.0 E.U./dL    Nitrite, Urine Negative Negative    Leukocyte Esterase, Urine LARGE (A) Negative    Microscopic Examination YES     Urine Type Voided     Urine Reflex to Culture Yes Lactate, Sepsis   Result Value Ref Range    Lactic Acid, Sepsis 1.5 0.4 - 1.9 mmol/L   Troponin   Result Value Ref Range    Troponin <0.01 <0.01 ng/mL   Microscopic Urinalysis   Result Value Ref Range    Bacteria, UA 4+ (A) None Seen /HPF    Hyaline Casts, UA 2 0 - 8 /LPF    WBC,  (H) 0 - 5 /HPF    RBC, UA 8 (H) 0 - 4 /HPF    Epithelial Cells, UA 1 0 - 5 /HPF         I estimate there is LOW risk acute appendicitis, acute cholecystitis, cholangitis, pancreatitis, diverticulitis, perforated viscus, perforated ulcer, colitis, C. diff colitis, ischemic colitis, bowel obstruction, acute dissection, thus I consider the discharge disposition reasonable. Also, there is no evidence or peritonitis, sepsis, or toxicity. Cal Louis and I have discussed the diagnosis and risks, and we agree with discharging home to follow-up with their primary doctor. We also discussed returning to the Emergency Department immediately if new or worsening symptoms occur. We have discussed the symptoms which are most concerning (e.g., bloody stool, fever, changing or worsening pain, vomiting) that necessitate immediate return. I am the Primary Clinician of Record. FINAL IMPRESSION      1. Acute cystitis with hematuria    2.  Lower abdominal pain          DISPOSITION/PLAN     DISPOSITION Decision To Discharge 02/07/2023 11:39:25 PM      PATIENT REFERRED TO:  [facility]) Pre-Services  [phone]  Schedule an appointment as soon as possible for a visit in 2 days      [facility] Emergency Department  [address] [facility]  [phone]    As needed, If symptoms worsen    DISCHARGE MEDICATIONS:  New Prescriptions    CEFUROXIME (CEFTIN) 250 MG TABLET    Take 1 tablet by mouth 2 times daily for 7 days       DISCONTINUED MEDICATIONS:  Discontinued Medications    No medications on file              (Please note that portions of this note were completed with a voice recognition program.  Efforts were made to edit the dictations but occasionally words are mis-transcribed.)    Kendra Gonzáles PA-C (electronically signed)        Kendra Gonzáles PA-C  02/08/23 0056

## 2023-02-08 NOTE — ED PROVIDER NOTES
I did not perform history or physical on Jelani Martínez. This patient was seen independently by an SUJEY. I did review the EKG, which is documented below. EKG  The Ekg interpreted by me in the absence of a cardiologist shows. sinus bradycardia, rate=59  Axis is   Left axis deviation  QTc is  normal  RBBB  No specific ST-T wave changes appreciated. No evidence of acute ischemia.    No significant change from prior EKG dated 9/4/2022          Royal Mendoza MD  02/08/23 7076

## 2023-02-08 NOTE — ED NOTES
Patient's son Ludmila Bateman called and states that he recently had a care plan conference with Dr. Mauro Moseley, the physician at the nursing home. They agreed that the patient is not going to receive any aggressive forms of treatment for any condition, and that the nursing home staff is to alert Dr. Mauro Moseley to get permission to transfer patient to the hospital. Nursing home staff didn't call the doctor or the son prior to sending him here for treatment. Son is requesting call back with test results, and letting him know prior to when patient is discharged back to [facility].      Sherren Rodes, RN  02/07/23 5777

## 2023-02-08 NOTE — ED NOTES
Patient dressed in clothes, no additional belongings to send with patient. Clean depends in place, non-skid socks on feet. Brown removed, patient tolerated well.      Delicia Cruz RN  02/08/23 3140

## 2023-02-09 LAB
ORGANISM: ABNORMAL
URINE CULTURE, ROUTINE: ABNORMAL

## 2023-05-30 ENCOUNTER — HOSPITAL ENCOUNTER (EMERGENCY)
Age: 83
Discharge: HOME | End: 2023-05-30
Attending: EMERGENCY MEDICINE
Payer: MEDICARE

## 2023-05-30 ENCOUNTER — APPOINTMENT (OUTPATIENT)
Dept: GENERAL RADIOLOGY | Age: 83
End: 2023-05-30
Payer: MEDICARE

## 2023-05-30 VITALS
BODY MASS INDEX: 21 KG/M2 | WEIGHT: 150 LBS | SYSTOLIC BLOOD PRESSURE: 188 MMHG | RESPIRATION RATE: 14 BRPM | OXYGEN SATURATION: 100 % | DIASTOLIC BLOOD PRESSURE: 87 MMHG | TEMPERATURE: 98.8 F | HEART RATE: 65 BPM | HEIGHT: 71 IN

## 2023-05-30 DIAGNOSIS — R55 SYNCOPE AND COLLAPSE: Primary | ICD-10-CM

## 2023-05-30 LAB
ALBUMIN SERPL-MCNC: 3.4 G/DL (ref 3.4–5)
ALBUMIN/GLOB SERPL: 0.8 {RATIO} (ref 1.1–2.2)
ALP SERPL-CCNC: 162 U/L (ref 40–129)
ALT SERPL-CCNC: 32 U/L (ref 10–40)
ANION GAP SERPL CALCULATED.3IONS-SCNC: 9 MMOL/L (ref 3–16)
AST SERPL-CCNC: 41 U/L (ref 15–37)
BACTERIA URNS QL MICRO: NORMAL /HPF
BASOPHILS # BLD: 0.1 K/UL (ref 0–0.2)
BASOPHILS NFR BLD: 0.7 %
BILIRUB SERPL-MCNC: 0.4 MG/DL (ref 0–1)
BILIRUB UR QL STRIP.AUTO: NEGATIVE
BUN SERPL-MCNC: 21 MG/DL (ref 7–20)
CALCIUM SERPL-MCNC: 9.3 MG/DL (ref 8.3–10.6)
CHLORIDE SERPL-SCNC: 100 MMOL/L (ref 99–110)
CLARITY UR: CLEAR
CO2 SERPL-SCNC: 31 MMOL/L (ref 21–32)
COLOR UR: YELLOW
CREAT SERPL-MCNC: 1.2 MG/DL (ref 0.8–1.3)
DEPRECATED RDW RBC AUTO: 13.5 % (ref 12.4–15.4)
EOSINOPHIL # BLD: 0.2 K/UL (ref 0–0.6)
EOSINOPHIL NFR BLD: 2.2 %
EPI CELLS #/AREA URNS AUTO: 1 /HPF (ref 0–5)
GFR SERPLBLD CREATININE-BSD FMLA CKD-EPI: 59 ML/MIN/{1.73_M2}
GLUCOSE SERPL-MCNC: 86 MG/DL (ref 70–99)
GLUCOSE UR STRIP.AUTO-MCNC: NEGATIVE MG/DL
HCT VFR BLD AUTO: 43.1 % (ref 40.5–52.5)
HGB BLD-MCNC: 14.4 G/DL (ref 13.5–17.5)
HGB UR QL STRIP.AUTO: ABNORMAL
HYALINE CASTS #/AREA URNS AUTO: 1 /LPF (ref 0–8)
KETONES UR STRIP.AUTO-MCNC: NEGATIVE MG/DL
LEUKOCYTE ESTERASE UR QL STRIP.AUTO: NEGATIVE
LYMPHOCYTES # BLD: 3.4 K/UL (ref 1–5.1)
LYMPHOCYTES NFR BLD: 34.5 %
MAGNESIUM SERPL-MCNC: 2.2 MG/DL (ref 1.8–2.4)
MCH RBC QN AUTO: 31.3 PG (ref 26–34)
MCHC RBC AUTO-ENTMCNC: 33.3 G/DL (ref 31–36)
MCV RBC AUTO: 93.9 FL (ref 80–100)
MONOCYTES # BLD: 0.8 K/UL (ref 0–1.3)
MONOCYTES NFR BLD: 8.2 %
NEUTROPHILS # BLD: 5.3 K/UL (ref 1.7–7.7)
NEUTROPHILS NFR BLD: 54.4 %
NITRITE UR QL STRIP.AUTO: NEGATIVE
PH UR STRIP.AUTO: 6.5 [PH] (ref 5–8)
PLATELET # BLD AUTO: 242 K/UL (ref 135–450)
PMV BLD AUTO: 10.3 FL (ref 5–10.5)
POTASSIUM SERPL-SCNC: 4.7 MMOL/L (ref 3.5–5.1)
PROT SERPL-MCNC: 7.7 G/DL (ref 6.4–8.2)
PROT UR STRIP.AUTO-MCNC: >=1000 MG/DL
RBC # BLD AUTO: 4.59 M/UL (ref 4.2–5.9)
RBC CLUMPS #/AREA URNS AUTO: 2 /HPF (ref 0–4)
SODIUM SERPL-SCNC: 140 MMOL/L (ref 136–145)
SP GR UR STRIP.AUTO: 1.02 (ref 1–1.03)
TROPONIN, HIGH SENSITIVITY: 17 NG/L (ref 0–22)
TROPONIN, HIGH SENSITIVITY: 18 NG/L (ref 0–22)
UA COMPLETE W REFLEX CULTURE PNL UR: ABNORMAL
UA DIPSTICK W REFLEX MICRO PNL UR: YES
URN SPEC COLLECT METH UR: ABNORMAL
UROBILINOGEN UR STRIP-ACNC: 1 E.U./DL
WBC # BLD AUTO: 9.8 K/UL (ref 4–11)
WBC #/AREA URNS AUTO: 1 /HPF (ref 0–5)

## 2023-05-30 PROCEDURE — 93005 ELECTROCARDIOGRAM TRACING: CPT | Performed by: EMERGENCY MEDICINE

## 2023-05-30 PROCEDURE — 99285 EMERGENCY DEPT VISIT HI MDM: CPT

## 2023-05-30 PROCEDURE — 83735 ASSAY OF MAGNESIUM: CPT

## 2023-05-30 PROCEDURE — 84484 ASSAY OF TROPONIN QUANT: CPT

## 2023-05-30 PROCEDURE — 85025 COMPLETE CBC W/AUTO DIFF WBC: CPT

## 2023-05-30 PROCEDURE — 71045 X-RAY EXAM CHEST 1 VIEW: CPT

## 2023-05-30 PROCEDURE — 81001 URINALYSIS AUTO W/SCOPE: CPT

## 2023-05-30 PROCEDURE — 2580000003 HC RX 258: Performed by: EMERGENCY MEDICINE

## 2023-05-30 PROCEDURE — 80053 COMPREHEN METABOLIC PANEL: CPT

## 2023-05-30 RX ORDER — 0.9 % SODIUM CHLORIDE 0.9 %
1000 INTRAVENOUS SOLUTION INTRAVENOUS ONCE
Status: COMPLETED | OUTPATIENT
Start: 2023-05-30 | End: 2023-05-30

## 2023-05-30 RX ADMIN — SODIUM CHLORIDE 1000 ML: 9 INJECTION, SOLUTION INTRAVENOUS at 17:26

## 2023-05-30 NOTE — ED NOTES
Pt. Placed in primofit and urinated almost immediately. Will send lad to sample. Will attempt for IV access using ultrasound 1 more time. Pt. Very difficult IV stick. Pt. Alert and oriented to self.  PETERSON Pederson RN  05/30/23 3623

## 2023-05-30 NOTE — ED PROVIDER NOTES
Straw/Yellow    Clarity, UA Clear Clear    Glucose, Ur Negative Negative mg/dL    Bilirubin Urine Negative Negative    Ketones, Urine Negative Negative mg/dL    Specific Gravity, UA 1.025 1.005 - 1.030    Blood, Urine TRACE (A) Negative    pH, UA 6.5 5.0 - 8.0    Protein, UA >=1000 Negative mg/dL    Urobilinogen, Urine 1.0 <2.0 E.U./dL    Nitrite, Urine Negative Negative    Leukocyte Esterase, Urine Negative Negative    Microscopic Examination YES     Urine Type NotGiven     Urine Reflex to Culture Not Indicated    Troponin   Result Value Ref Range    Troponin, High Sensitivity 18 0 - 22 ng/L   Troponin   Result Value Ref Range    Troponin, High Sensitivity 17 0 - 22 ng/L   Microscopic Urinalysis   Result Value Ref Range    Bacteria, UA None Seen None Seen /HPF    Hyaline Casts, UA 1 0 - 8 /LPF    WBC, UA 1 0 - 5 /HPF    RBC, UA 2 0 - 4 /HPF    Epithelial Cells, UA 1 0 - 5 /HPF   EKG 12 Lead   Result Value Ref Range    Ventricular Rate 63 BPM    Atrial Rate 63 BPM    P-R Interval 174 ms    QRS Duration 132 ms    Q-T Interval 464 ms    QTc Calculation (Bazett) 474 ms    P Axis 82 degrees    R Axis -15 degrees    T Axis -6 degrees    Diagnosis       Normal sinus rhythmRight bundle branch blockModerate voltage criteria for LVH, may be normal variantAbnormal ECG       RADIOLOGY  Any applicable radiology studies including x-ray, CT, MRI, and/or ultrasound, were reviewed independently by me in addition to the radiologist.  I reviewed all radiology images and reports as well from this evaluation. XR CHEST PORTABLE   Final Result   No radiographic evidence of acute pulmonary abnormality seen. Overall, no   significant changes from the prior study are identified. ED COURSE/MDM  Old records reviewed. Labs and imaging reviewed. Patient seen and evaluated by myself. Patient does present for evaluation of syncope. Patient is well-appearing here.   His work-up is fairly unremarkable he is hypertensive but looking

## 2023-05-31 LAB
EKG ATRIAL RATE: 63 BPM
EKG DIAGNOSIS: NORMAL
EKG P AXIS: 82 DEGREES
EKG P-R INTERVAL: 174 MS
EKG Q-T INTERVAL: 464 MS
EKG QRS DURATION: 132 MS
EKG QTC CALCULATION (BAZETT): 474 MS
EKG R AXIS: -15 DEGREES
EKG T AXIS: -6 DEGREES
EKG VENTRICULAR RATE: 63 BPM

## 2023-05-31 PROCEDURE — 93010 ELECTROCARDIOGRAM REPORT: CPT | Performed by: INTERNAL MEDICINE

## 2024-01-03 ENCOUNTER — APPOINTMENT (OUTPATIENT)
Dept: GENERAL RADIOLOGY | Age: 84
DRG: 689 | End: 2024-01-03
Payer: MEDICARE

## 2024-01-03 ENCOUNTER — HOSPITAL ENCOUNTER (INPATIENT)
Age: 84
LOS: 4 days | Discharge: NURSING, REHAB OR LONG-TERM CARE | DRG: 689 | End: 2024-01-07
Attending: EMERGENCY MEDICINE | Admitting: INTERNAL MEDICINE
Payer: MEDICARE

## 2024-01-03 DIAGNOSIS — R41.82 ALTERED MENTAL STATUS, UNSPECIFIED ALTERED MENTAL STATUS TYPE: ICD-10-CM

## 2024-01-03 DIAGNOSIS — J18.9 PNEUMONIA DUE TO INFECTIOUS ORGANISM, UNSPECIFIED LATERALITY, UNSPECIFIED PART OF LUNG: ICD-10-CM

## 2024-01-03 DIAGNOSIS — N39.0 URINARY TRACT INFECTION WITHOUT HEMATURIA, SITE UNSPECIFIED: Primary | ICD-10-CM

## 2024-01-03 PROBLEM — I69.30 LATE EFFECTS OF CEREBRAL ISCHEMIC STROKE: Status: ACTIVE | Noted: 2024-01-03

## 2024-01-03 LAB
ALBUMIN SERPL-MCNC: 2.6 G/DL (ref 3.4–5)
ALBUMIN/GLOB SERPL: 0.5 {RATIO} (ref 1.1–2.2)
ALP SERPL-CCNC: 135 U/L (ref 40–129)
ALT SERPL-CCNC: 75 U/L (ref 10–40)
AMORPHOUS: PRESENT
ANION GAP SERPL CALCULATED.3IONS-SCNC: 9 MMOL/L (ref 3–16)
AST SERPL-CCNC: 113 U/L (ref 15–37)
BACTERIA URNS QL MICRO: ABNORMAL /HPF
BASOPHILS # BLD: 0.2 K/UL (ref 0–0.2)
BASOPHILS NFR BLD: 1.1 %
BILIRUB SERPL-MCNC: 0.7 MG/DL (ref 0–1)
BILIRUB UR QL STRIP.AUTO: NEGATIVE
BUN SERPL-MCNC: 30 MG/DL (ref 7–20)
CALCIUM SERPL-MCNC: 8.7 MG/DL (ref 8.3–10.6)
CHARACTER UR: ABNORMAL
CHLORIDE SERPL-SCNC: 107 MMOL/L (ref 99–110)
CLARITY UR: ABNORMAL
CO2 SERPL-SCNC: 24 MMOL/L (ref 21–32)
COLOR UR: ABNORMAL
CREAT SERPL-MCNC: 1.2 MG/DL (ref 0.8–1.3)
DEPRECATED RDW RBC AUTO: 13.5 % (ref 12.4–15.4)
EOSINOPHIL # BLD: 0.1 K/UL (ref 0–0.6)
EOSINOPHIL NFR BLD: 0.3 %
EPI CELLS #/AREA URNS AUTO: 3 /HPF (ref 0–5)
FLUAV RNA RESP QL NAA+PROBE: NOT DETECTED
FLUBV RNA RESP QL NAA+PROBE: NOT DETECTED
GFR SERPLBLD CREATININE-BSD FMLA CKD-EPI: 59 ML/MIN/{1.73_M2}
GLUCOSE BLD-MCNC: 87 MG/DL (ref 70–99)
GLUCOSE SERPL-MCNC: 108 MG/DL (ref 70–99)
GLUCOSE UR STRIP.AUTO-MCNC: NEGATIVE MG/DL
HCT VFR BLD AUTO: 42.3 % (ref 40.5–52.5)
HGB BLD-MCNC: 14.3 G/DL (ref 13.5–17.5)
HGB UR QL STRIP.AUTO: ABNORMAL
HYALINE CASTS #/AREA URNS LPF: ABNORMAL /LPF (ref 0–2)
KETONES UR STRIP.AUTO-MCNC: NEGATIVE MG/DL
LEUKOCYTE ESTERASE UR QL STRIP.AUTO: ABNORMAL
LYMPHOCYTES # BLD: 2.5 K/UL (ref 1–5.1)
LYMPHOCYTES NFR BLD: 15 %
MCH RBC QN AUTO: 31.4 PG (ref 26–34)
MCHC RBC AUTO-ENTMCNC: 33.9 G/DL (ref 31–36)
MCV RBC AUTO: 92.9 FL (ref 80–100)
MONOCYTES # BLD: 1.1 K/UL (ref 0–1.3)
MONOCYTES NFR BLD: 6.5 %
MUCUS: PRESENT
NEUTROPHILS # BLD: 12.9 K/UL (ref 1.7–7.7)
NEUTROPHILS NFR BLD: 77.1 %
NITRITE UR QL STRIP.AUTO: POSITIVE
PERFORMED ON: NORMAL
PH UR STRIP.AUTO: 6 [PH] (ref 5–8)
PLATELET # BLD AUTO: 486 K/UL (ref 135–450)
PMV BLD AUTO: 10.9 FL (ref 5–10.5)
POTASSIUM SERPL-SCNC: 4.5 MMOL/L (ref 3.5–5.1)
PROT SERPL-MCNC: 7.6 G/DL (ref 6.4–8.2)
PROT UR STRIP.AUTO-MCNC: >=1000 MG/DL
RBC # BLD AUTO: 4.55 M/UL (ref 4.2–5.9)
RBC #/AREA URNS HPF: ABNORMAL /HPF (ref 0–4)
REASON FOR REJECTION: NORMAL
REJECTED TEST: NORMAL
RSV AG NOSE QL: NEGATIVE
SARS-COV-2 RNA RESP QL NAA+PROBE: NOT DETECTED
SODIUM SERPL-SCNC: 140 MMOL/L (ref 136–145)
SP GR UR STRIP.AUTO: 1.02 (ref 1–1.03)
UA COMPLETE W REFLEX CULTURE PNL UR: YES
UA DIPSTICK W REFLEX MICRO PNL UR: YES
URN SPEC COLLECT METH UR: ABNORMAL
UROBILINOGEN UR STRIP-ACNC: 2 E.U./DL
WBC # BLD AUTO: 16.8 K/UL (ref 4–11)
WBC #/AREA URNS AUTO: 833 /HPF (ref 0–5)

## 2024-01-03 PROCEDURE — 2580000003 HC RX 258: Performed by: INTERNAL MEDICINE

## 2024-01-03 PROCEDURE — 2580000003 HC RX 258: Performed by: PHYSICIAN ASSISTANT

## 2024-01-03 PROCEDURE — 36415 COLL VENOUS BLD VENIPUNCTURE: CPT

## 2024-01-03 PROCEDURE — 71045 X-RAY EXAM CHEST 1 VIEW: CPT

## 2024-01-03 PROCEDURE — 87077 CULTURE AEROBIC IDENTIFY: CPT

## 2024-01-03 PROCEDURE — 85025 COMPLETE CBC W/AUTO DIFF WBC: CPT

## 2024-01-03 PROCEDURE — 1200000000 HC SEMI PRIVATE

## 2024-01-03 PROCEDURE — 87636 SARSCOV2 & INF A&B AMP PRB: CPT

## 2024-01-03 PROCEDURE — 87807 RSV ASSAY W/OPTIC: CPT

## 2024-01-03 PROCEDURE — 87086 URINE CULTURE/COLONY COUNT: CPT

## 2024-01-03 PROCEDURE — 80053 COMPREHEN METABOLIC PANEL: CPT

## 2024-01-03 PROCEDURE — 99285 EMERGENCY DEPT VISIT HI MDM: CPT

## 2024-01-03 PROCEDURE — 87186 SC STD MICRODIL/AGAR DIL: CPT

## 2024-01-03 PROCEDURE — 96374 THER/PROPH/DIAG INJ IV PUSH: CPT

## 2024-01-03 PROCEDURE — 6360000002 HC RX W HCPCS: Performed by: PHYSICIAN ASSISTANT

## 2024-01-03 PROCEDURE — 6370000000 HC RX 637 (ALT 250 FOR IP): Performed by: INTERNAL MEDICINE

## 2024-01-03 PROCEDURE — 81001 URINALYSIS AUTO W/SCOPE: CPT

## 2024-01-03 PROCEDURE — 6370000000 HC RX 637 (ALT 250 FOR IP): Performed by: PHYSICIAN ASSISTANT

## 2024-01-03 RX ORDER — ASPIRIN 81 MG/1
81 TABLET, CHEWABLE ORAL DAILY
Status: DISCONTINUED | OUTPATIENT
Start: 2024-01-04 | End: 2024-01-07 | Stop reason: HOSPADM

## 2024-01-03 RX ORDER — LATANOPROST 50 UG/ML
1 SOLUTION/ DROPS OPHTHALMIC NIGHTLY
COMMUNITY

## 2024-01-03 RX ORDER — GUAIFENESIN 200 MG/10ML
200 LIQUID ORAL 4 TIMES DAILY PRN
Status: DISCONTINUED | OUTPATIENT
Start: 2024-01-03 | End: 2024-01-07 | Stop reason: HOSPADM

## 2024-01-03 RX ORDER — ACETAMINOPHEN 500 MG
1000 TABLET ORAL ONCE
Status: COMPLETED | OUTPATIENT
Start: 2024-01-03 | End: 2024-01-03

## 2024-01-03 RX ORDER — DEXTRAN 70, AND HYPROMELLOSE 2910 1; 3 MG/ML; MG/ML
2 SOLUTION/ DROPS OPHTHALMIC 3 TIMES DAILY
COMMUNITY

## 2024-01-03 RX ORDER — TIMOLOL MALEATE 5 MG/ML
1 SOLUTION/ DROPS OPHTHALMIC 2 TIMES DAILY
Status: DISCONTINUED | OUTPATIENT
Start: 2024-01-03 | End: 2024-01-07 | Stop reason: HOSPADM

## 2024-01-03 RX ORDER — ATENOLOL 25 MG/1
25 TABLET ORAL DAILY
Status: DISCONTINUED | OUTPATIENT
Start: 2024-01-04 | End: 2024-01-05

## 2024-01-03 RX ORDER — ONDANSETRON 4 MG/1
4 TABLET, FILM COATED ORAL EVERY 8 HOURS PRN
COMMUNITY

## 2024-01-03 RX ORDER — DORZOLAMIDE HYDROCHLORIDE AND TIMOLOL MALEATE 20; 5 MG/ML; MG/ML
1 SOLUTION/ DROPS OPHTHALMIC 2 TIMES DAILY
Status: DISCONTINUED | OUTPATIENT
Start: 2024-01-03 | End: 2024-01-03 | Stop reason: SDUPTHER

## 2024-01-03 RX ORDER — DORZOLAMIDE HCL 20 MG/ML
1 SOLUTION/ DROPS OPHTHALMIC 2 TIMES DAILY
Status: DISCONTINUED | OUTPATIENT
Start: 2024-01-03 | End: 2024-01-07 | Stop reason: HOSPADM

## 2024-01-03 RX ORDER — LISINOPRIL 20 MG/1
20 TABLET ORAL DAILY
Status: DISCONTINUED | OUTPATIENT
Start: 2024-01-04 | End: 2024-01-07 | Stop reason: HOSPADM

## 2024-01-03 RX ORDER — FAMOTIDINE 20 MG/1
20 TABLET, FILM COATED ORAL DAILY
Status: DISCONTINUED | OUTPATIENT
Start: 2024-01-04 | End: 2024-01-03

## 2024-01-03 RX ORDER — ATORVASTATIN CALCIUM 80 MG/1
80 TABLET, FILM COATED ORAL DAILY
Status: DISCONTINUED | OUTPATIENT
Start: 2024-01-04 | End: 2024-01-07 | Stop reason: HOSPADM

## 2024-01-03 RX ORDER — 0.9 % SODIUM CHLORIDE 0.9 %
500 INTRAVENOUS SOLUTION INTRAVENOUS ONCE
Status: COMPLETED | OUTPATIENT
Start: 2024-01-03 | End: 2024-01-03

## 2024-01-03 RX ORDER — BRIMONIDINE TARTRATE 2 MG/ML
1 SOLUTION/ DROPS OPHTHALMIC 2 TIMES DAILY
Status: DISCONTINUED | OUTPATIENT
Start: 2024-01-03 | End: 2024-01-07 | Stop reason: HOSPADM

## 2024-01-03 RX ORDER — MIRTAZAPINE 15 MG/1
7.5 TABLET, FILM COATED ORAL NIGHTLY
Status: DISCONTINUED | OUTPATIENT
Start: 2024-01-03 | End: 2024-01-07 | Stop reason: HOSPADM

## 2024-01-03 RX ORDER — MIRTAZAPINE 7.5 MG/1
7.5 TABLET, FILM COATED ORAL NIGHTLY
COMMUNITY

## 2024-01-03 RX ORDER — ONDANSETRON 4 MG/1
4 TABLET, ORALLY DISINTEGRATING ORAL EVERY 8 HOURS PRN
Status: DISCONTINUED | OUTPATIENT
Start: 2024-01-03 | End: 2024-01-07 | Stop reason: HOSPADM

## 2024-01-03 RX ORDER — GUAIFENESIN 200 MG/10ML
200 LIQUID ORAL 4 TIMES DAILY PRN
COMMUNITY

## 2024-01-03 RX ORDER — ACETAMINOPHEN 500 MG
1000 TABLET ORAL EVERY 6 HOURS PRN
Status: DISCONTINUED | OUTPATIENT
Start: 2024-01-03 | End: 2024-01-07 | Stop reason: HOSPADM

## 2024-01-03 RX ORDER — CARBOXYMETHYLCELLULOSE SODIUM 10 MG/ML
2 GEL OPHTHALMIC 3 TIMES DAILY
Status: DISCONTINUED | OUTPATIENT
Start: 2024-01-03 | End: 2024-01-07 | Stop reason: HOSPADM

## 2024-01-03 RX ORDER — DEXTROSE AND SODIUM CHLORIDE 5; .45 G/100ML; G/100ML
INJECTION, SOLUTION INTRAVENOUS CONTINUOUS
Status: DISCONTINUED | OUTPATIENT
Start: 2024-01-03 | End: 2024-01-05

## 2024-01-03 RX ORDER — ENOXAPARIN SODIUM 100 MG/ML
40 INJECTION SUBCUTANEOUS DAILY
Status: DISCONTINUED | OUTPATIENT
Start: 2024-01-04 | End: 2024-01-07 | Stop reason: HOSPADM

## 2024-01-03 RX ORDER — BRIMONIDINE TARTRATE 2 MG/ML
1 SOLUTION/ DROPS OPHTHALMIC 2 TIMES DAILY
COMMUNITY

## 2024-01-03 RX ORDER — LATANOPROST 50 UG/ML
1 SOLUTION/ DROPS OPHTHALMIC NIGHTLY
Status: DISCONTINUED | OUTPATIENT
Start: 2024-01-03 | End: 2024-01-07 | Stop reason: HOSPADM

## 2024-01-03 RX ORDER — DORZOLAMIDE HYDROCHLORIDE AND TIMOLOL MALEATE 20; 5 MG/ML; MG/ML
1 SOLUTION/ DROPS OPHTHALMIC 2 TIMES DAILY
COMMUNITY

## 2024-01-03 RX ADMIN — CEFTRIAXONE SODIUM 1000 MG: 1 INJECTION, POWDER, FOR SOLUTION INTRAMUSCULAR; INTRAVENOUS at 18:39

## 2024-01-03 RX ADMIN — SODIUM CHLORIDE 500 ML: 9 INJECTION, SOLUTION INTRAVENOUS at 18:43

## 2024-01-03 RX ADMIN — LATANOPROST 1 DROP: 50 SOLUTION OPHTHALMIC at 22:23

## 2024-01-03 RX ADMIN — TIMOLOL MALEATE 1 DROP: 5 SOLUTION OPHTHALMIC at 22:23

## 2024-01-03 RX ADMIN — DEXTROSE AND SODIUM CHLORIDE: 5; 450 INJECTION, SOLUTION INTRAVENOUS at 22:53

## 2024-01-03 RX ADMIN — DORZOLAMIDE HYDROCHLORIDE 1 DROP: 20 SOLUTION/ DROPS OPHTHALMIC at 22:22

## 2024-01-03 RX ADMIN — CARBOXYMETHYLCELLULOSE SODIUM 2 DROP: 10 GEL OPHTHALMIC at 22:28

## 2024-01-03 RX ADMIN — MIRTAZAPINE 7.5 MG: 15 TABLET, FILM COATED ORAL at 22:26

## 2024-01-03 RX ADMIN — BRIMONIDINE TARTRATE 1 DROP: 2 SOLUTION OPHTHALMIC at 22:23

## 2024-01-03 RX ADMIN — ACETAMINOPHEN 1000 MG: 500 TABLET ORAL at 16:54

## 2024-01-03 ASSESSMENT — LIFESTYLE VARIABLES
HOW MANY STANDARD DRINKS CONTAINING ALCOHOL DO YOU HAVE ON A TYPICAL DAY: PATIENT DOES NOT DRINK
HOW OFTEN DO YOU HAVE A DRINK CONTAINING ALCOHOL: NEVER

## 2024-01-03 NOTE — ED PROVIDER NOTES
[facility]  EMERGENCY DEPARTMENT ENCOUNTER        Pt Name: Jelani Martínez  MRN: [id number]  Birthdate 1940  Date of evaluation: 1/3/2024  Provider: MELISA Lyons  PCP: [facility]  Note Started: 4:50 PM EST 1/3/24      SUJEY. I have evaluated this patient.        CHIEF COMPLAINT       Chief Complaint   Patient presents with    Illness     Pt in by [facility] ems from home at [facility], per nurse pt has RSV, per son pt has VRE, hx of uti's and worsening AMS x2 days. Hx of dementia alert to self only       HISTORY OF PRESENT ILLNESS: 1 or more Elements     History from : Patient    Limitations to history : None    Jelani Martínez is a 83 y.o. male who presents to the emergency room due to concern for possible RSV versus urinary tract infection.  According to report patient's son states that the patient has been having worsening confusion although he does have underlying dementia and states that this been going on for last 2 days.  There is some confusion on whether the patient has a history of RSV versus VRE.  This is not very clear based off her report.  Supposedly the son states that the patient has VRE in the nursing report from [facility] states that he has RSV.  Patient is a poor historian given his dementia.  He does answer yes or no to my questions.  He does follow my commands.  He feels warm to touch.    Nursing Notes were all reviewed and agreed with or any disagreements were addressed in the HPI.    REVIEW OF SYSTEMS :      Review of Systems   Unable to perform ROS: Dementia       Positives and Pertinent negatives as per HPI.     SURGICAL HISTORY     Past Surgical History:   Procedure Laterality Date    LUNG REMOVAL, PARTIAL Right     noted on CT scan    TONSILLECTOMY         CURRENTMEDICATIONS       Discharge Medication List as of 1/7/2024  2:59 PM        CONTINUE these medications which have NOT CHANGED    Details   brimonidine (ALPHAGAN) 0.2 % ophthalmic solution

## 2024-01-04 ENCOUNTER — APPOINTMENT (OUTPATIENT)
Dept: CT IMAGING | Age: 84
DRG: 689 | End: 2024-01-04
Payer: MEDICARE

## 2024-01-04 LAB
ANION GAP SERPL CALCULATED.3IONS-SCNC: 5 MMOL/L (ref 3–16)
BUN SERPL-MCNC: 25 MG/DL (ref 7–20)
CALCIUM SERPL-MCNC: 8.6 MG/DL (ref 8.3–10.6)
CHLORIDE SERPL-SCNC: 107 MMOL/L (ref 99–110)
CO2 SERPL-SCNC: 28 MMOL/L (ref 21–32)
CREAT SERPL-MCNC: 1.1 MG/DL (ref 0.8–1.3)
DEPRECATED RDW RBC AUTO: 13.2 % (ref 12.4–15.4)
GFR SERPLBLD CREATININE-BSD FMLA CKD-EPI: >60 ML/MIN/{1.73_M2}
GLUCOSE BLD-MCNC: 120 MG/DL (ref 70–99)
GLUCOSE SERPL-MCNC: 126 MG/DL (ref 70–99)
HCT VFR BLD AUTO: 37.2 % (ref 40.5–52.5)
HGB BLD-MCNC: 12.1 G/DL (ref 13.5–17.5)
MCH RBC QN AUTO: 30.9 PG (ref 26–34)
MCHC RBC AUTO-ENTMCNC: 32.5 G/DL (ref 31–36)
MCV RBC AUTO: 95 FL (ref 80–100)
NT-PROBNP SERPL-MCNC: 448 PG/ML (ref 0–449)
PERFORMED ON: ABNORMAL
PLATELET # BLD AUTO: 337 K/UL (ref 135–450)
PMV BLD AUTO: 9.2 FL (ref 5–10.5)
POTASSIUM SERPL-SCNC: 4.1 MMOL/L (ref 3.5–5.1)
RBC # BLD AUTO: 3.91 M/UL (ref 4.2–5.9)
SODIUM SERPL-SCNC: 140 MMOL/L (ref 136–145)
WBC # BLD AUTO: 13.1 K/UL (ref 4–11)

## 2024-01-04 PROCEDURE — 80048 BASIC METABOLIC PNL TOTAL CA: CPT

## 2024-01-04 PROCEDURE — 83880 ASSAY OF NATRIURETIC PEPTIDE: CPT

## 2024-01-04 PROCEDURE — 6360000004 HC RX CONTRAST MEDICATION: Performed by: INTERNAL MEDICINE

## 2024-01-04 PROCEDURE — 1200000000 HC SEMI PRIVATE

## 2024-01-04 PROCEDURE — 2580000003 HC RX 258: Performed by: INTERNAL MEDICINE

## 2024-01-04 PROCEDURE — 92610 EVALUATE SWALLOWING FUNCTION: CPT

## 2024-01-04 PROCEDURE — 36415 COLL VENOUS BLD VENIPUNCTURE: CPT

## 2024-01-04 PROCEDURE — 6360000002 HC RX W HCPCS: Performed by: INTERNAL MEDICINE

## 2024-01-04 PROCEDURE — 6370000000 HC RX 637 (ALT 250 FOR IP): Performed by: INTERNAL MEDICINE

## 2024-01-04 PROCEDURE — 85027 COMPLETE CBC AUTOMATED: CPT

## 2024-01-04 PROCEDURE — 92526 ORAL FUNCTION THERAPY: CPT

## 2024-01-04 PROCEDURE — 71260 CT THORAX DX C+: CPT

## 2024-01-04 RX ORDER — HYDRALAZINE HYDROCHLORIDE 20 MG/ML
10 INJECTION INTRAMUSCULAR; INTRAVENOUS EVERY 6 HOURS PRN
Status: DISCONTINUED | OUTPATIENT
Start: 2024-01-04 | End: 2024-01-07 | Stop reason: HOSPADM

## 2024-01-04 RX ADMIN — DEXTROSE AND SODIUM CHLORIDE: 5; 450 INJECTION, SOLUTION INTRAVENOUS at 07:53

## 2024-01-04 RX ADMIN — ENOXAPARIN SODIUM 40 MG: 100 INJECTION SUBCUTANEOUS at 10:19

## 2024-01-04 RX ADMIN — ATORVASTATIN CALCIUM 80 MG: 80 TABLET, FILM COATED ORAL at 10:18

## 2024-01-04 RX ADMIN — DEXTROSE AND SODIUM CHLORIDE: 5; 450 INJECTION, SOLUTION INTRAVENOUS at 18:28

## 2024-01-04 RX ADMIN — MIRTAZAPINE 7.5 MG: 15 TABLET, FILM COATED ORAL at 21:26

## 2024-01-04 RX ADMIN — ATENOLOL 25 MG: 25 TABLET ORAL at 10:18

## 2024-01-04 RX ADMIN — TIMOLOL MALEATE 1 DROP: 5 SOLUTION OPHTHALMIC at 10:18

## 2024-01-04 RX ADMIN — CARBOXYMETHYLCELLULOSE SODIUM 2 DROP: 10 GEL OPHTHALMIC at 10:18

## 2024-01-04 RX ADMIN — IOPAMIDOL 75 ML: 755 INJECTION, SOLUTION INTRAVENOUS at 11:35

## 2024-01-04 RX ADMIN — CEFTRIAXONE SODIUM 1000 MG: 1 INJECTION, POWDER, FOR SOLUTION INTRAMUSCULAR; INTRAVENOUS at 18:32

## 2024-01-04 RX ADMIN — DORZOLAMIDE HYDROCHLORIDE 1 DROP: 20 SOLUTION/ DROPS OPHTHALMIC at 21:26

## 2024-01-04 RX ADMIN — ASPIRIN 81 MG: 81 TABLET, CHEWABLE ORAL at 10:18

## 2024-01-04 RX ADMIN — LISINOPRIL 20 MG: 20 TABLET ORAL at 10:18

## 2024-01-04 RX ADMIN — TIMOLOL MALEATE 1 DROP: 5 SOLUTION OPHTHALMIC at 21:25

## 2024-01-04 RX ADMIN — CARBOXYMETHYLCELLULOSE SODIUM 2 DROP: 10 GEL OPHTHALMIC at 15:41

## 2024-01-04 RX ADMIN — BRIMONIDINE TARTRATE 1 DROP: 2 SOLUTION OPHTHALMIC at 10:18

## 2024-01-04 RX ADMIN — CARBOXYMETHYLCELLULOSE SODIUM 2 DROP: 10 GEL OPHTHALMIC at 21:26

## 2024-01-04 RX ADMIN — LATANOPROST 1 DROP: 50 SOLUTION OPHTHALMIC at 21:25

## 2024-01-04 RX ADMIN — DORZOLAMIDE HYDROCHLORIDE 1 DROP: 20 SOLUTION/ DROPS OPHTHALMIC at 10:18

## 2024-01-04 RX ADMIN — BRIMONIDINE TARTRATE 1 DROP: 2 SOLUTION OPHTHALMIC at 21:26

## 2024-01-04 NOTE — ED NOTES
ED TO INPATIENT SBAR HANDOFF    Patient Name: Jelani Martínez   :  1940  83 y.o.   MRN:  [id number]  Preferred Name  Jelani  ED Room #:  ED-0022/22  Family/Caregiver Present no   Restraints no   Sitter no   Sepsis Risk Score Sepsis Risk Score: 3.17    Situation  Code Status: Prior No additional code details.    Allergies: Patient has no known allergies.  Weight: No data found.  Arrived from: nursing home  Chief Complaint:   Chief Complaint   Patient presents with    Illness     Pt in by [facility] ems from home at [facility], per nurse pt has RSV, per son pt has VRE, hx of uti's and worsening AMS x2 days. Hx of dementia alert to self only     Hospital Problem/Diagnosis:  Principal Problem:    UTI (urinary tract infection)  Resolved Problems:    * No resolved hospital problems. *    Imaging:   XR CHEST PORTABLE   Final Result   1. Patchy opacity in the right lower lung, concerning for developing   pneumonia.   2. Interval increased atelectasis in the left lung base.   3. Similar masslike density in the peripheral left lung.           Abnormal labs:   Abnormal Labs Reviewed   CBC WITH AUTO DIFFERENTIAL - Abnormal; Notable for the following components:       Result Value    WBC 16.8 (*)     Platelets 486 (*)     MPV 10.9 (*)     Neutrophils Absolute 12.9 (*)     All other components within normal limits   URINALYSIS WITH REFLEX TO CULTURE - Abnormal; Notable for the following components:    Color, UA DARK YELLOW (*)     Clarity, UA TURBID (*)     Blood, Urine MODERATE (*)     Urobilinogen, Urine 2.0 (*)     Nitrite, Urine POSITIVE (*)     Leukocyte Esterase, Urine MODERATE (*)     All other components within normal limits   MICROSCOPIC URINALYSIS - Abnormal; Notable for the following components:    Bacteria, UA 3+ (*)     WBC,  (*)     Amorphous, UA Present (*)     Mucus, UA Present (*)     All other components within normal limits   COMPREHENSIVE METABOLIC PANEL W/ REFLEX TO MG FOR LOW K - Abnormal;

## 2024-01-04 NOTE — H&P
available.    REVIEW OF SYSTEMS:  Negative for loss of consciousness.  Does have  increased confusion, disorientation, generalized weakness, lethargy,  poor appetite, low-grade temperature.  There is no convulsions.  No  recent oropharyngeal dysphagia.  No angina pectoris.  Does have  exertional shortness of breath.  No orthopnea or paroxysmal nocturnal  dyspnea.  Does have some nonspecific vague abdominal pain.  No  hematuria.  Does have some urinary difficulty.  There is generalized  neuromuscular weakness, poor coordination.  Babinski is bilaterally  absent.    PHYSICAL EXAMINATION:  GENERAL:  Lethargic, incoherent, disoriented totally.  VITAL SIGNS:  Temperature 97.8, blood pressure 160/85, respirations 16,  heart rate is 75, O2 sat 100% on room air.  HEENT:  Oral mucosa dry.  SKIN:  Warm and dry.  NECK:  Supple.  No jugular venous distention, has poor turgor.  LUNGS:  Poor inspiration.  No crackles or wheezing.  HEART:  Regular rate and rhythm.  S1, S2, 1/6 systolic ejection murmur.   No gallop rhythm.  ABDOMEN:  Soft, nontender.  No guarding, rebound, rigidity.  EXTREMITIES:  Distal pulsations are weak.  NEUROLOGIC:  The patient has severe generalized neuromuscular weakness,  cannot participate in any active sensorimotor evaluation.  Babinski is  absent.    LABORATORY EVALUATION:  Sodium 140, potassium 4.5, chloride 107, CO2 24,  BUN 30, creatinine 1.2, anion gap is 9.  Blood glucose is 1028.  Albumin  2.6, alkaline phosphatase 135, , ALT 75.  White blood cell count  16.8, hemoglobin/hematocrit 14.3 and 42.3, platelet count is 486.   Urinalysis shows florid urinary tract infection with positive nitrites,  moderate leukocyte esterase, 833 wbc's, 3+ bacteria.    IMAGING STUDIES:  CT scan of the abdomen and pelvis shows no acute  finding in the abdomen or pelvis, mild sigmoid colon diverticulosis,  small fat-containing left inguinal hernia, no evidence of incarceration.    ASSESSMENT:  Urinary tract

## 2024-01-04 NOTE — ED PROVIDER NOTES
[facility] Emergency Department      Pt Name: Jelani Martínez  MRN: [id number]  Birthdate 1940  Date of evaluation: 1/3/2024  Provider: DOYLE PECK MD  I personally saw Jelani Martínez and made and approved the management plan with the advanced practice provider.  I take responsibility for the patient management.     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 Hx of dementia alert to self only     Jelani Martínez has a past medical history of Acute prostatitis (11/02/2021), Benign prostatic hyperplasia without lower urinary tract symptoms, Carpal tunnel syndrome, Cerebral infarction (HCC), Cognitive communication deficit, COVID-19 (12/30/2021), Emphysema, unspecified (HCC), Hyperlipidemia, Hypertension, Muscle weakness, Neuralgia and neuritis, Nicotine dependence, Polyosteoarthritis, Radiculopathy, TIA (transient ischemic attack), and Urge incontinence.  He has a past surgical history that includes Tonsillectomy and Lung removal, partial (Right).    No current facility-administered medications on file prior to encounter.     Current Outpatient Medications on File Prior to Encounter   Medication Sig Dispense Refill    brimonidine (ALPHAGAN) 0.2 % ophthalmic solution Place 1 drop into both eyes 2 times daily      dorzolamide-timolol (COSOPT) 2-0.5 % ophthalmic solution Place 1 drop into both eyes 2 times daily      guaiFENesin (ROBITUSSIN) 100 MG/5ML liquid Take 10 mLs by mouth 4 times daily as needed for Cough      latanoprost (XALATAN) 0.005 % ophthalmic solution Place 1 drop into both eyes nightly      mirtazapine (REMERON) 7.5 MG tablet Take 1 tablet by mouth nightly      dextran 70-hypromellose (NATURAL BALANCE TEARS) 0.1-0.3 % SOLN opthalmic solution Place 2 drops into both eyes 3 times daily      ondansetron (ZOFRAN) 4

## 2024-01-04 NOTE — PLAN OF CARE
Problem: Skin/Tissue Integrity  Goal: Absence of new skin breakdown  Description: 1.  Monitor for areas of redness and/or skin breakdown  2.  Assess vascular access sites hourly  3.  Every 4-6 hours minimum:  Change oxygen saturation probe site  4.  Every 4-6 hours:  If on nasal continuous positive airway pressure, respiratory therapy assess nares and determine need for appliance change or resting period.  Outcome: Progressing     Problem: Safety - Adult  Goal: Free from fall injury  Outcome: Progressing     Problem: Confusion  Goal: Confusion, delirium, dementia, or psychosis is improved or at baseline  Description: INTERVENTIONS:  1. Assess for possible contributors to thought disturbance, including medications, impaired vision or hearing, underlying metabolic abnormalities, dehydration, psychiatric diagnoses, and notify attending LIP  2. [place] high risk fall precautions, as indicated  3. Provide frequent short contacts to provide reality reorientation, refocusing and direction  4. Decrease environmental stimuli, including noise as appropriate  5. Monitor and intervene to maintain adequate nutrition, hydration, elimination, sleep and activity  6. If unable to ensure safety without constant attention obtain sitter and review sitter guidelines with assigned personnel  7. Initiate Psychosocial CNS and Spiritual Care consult, as indicated  Outcome: Progressing     Problem: Neurosensory - Adult  Goal: Achieves stable or improved neurological status  Outcome: Progressing     Problem: Respiratory - Adult  Goal: Achieves optimal ventilation and oxygenation  Outcome: Progressing     Problem: Musculoskeletal - Adult  Goal: Return mobility to safest level of function  Outcome: Progressing  Goal: Return ADL status to a safe level of function  Outcome: Progressing     Problem: Genitourinary - Adult  Goal: Absence of urinary retention  Outcome: Progressing     Problem: Infection - Adult  Goal: Absence of infection at

## 2024-01-04 NOTE — CARE COORDINATION
Discharge Planning:     (ROSELINE) reviewed patient's chart which states that patient is from Home at [facility]. ROSELINE called Home at [facility] admissions staff, Esme ([phone]), and left a voicemail requesting a callback to confirm patient's residency and ability to return upon discharge.      SAM Toure, [facility]  Social Work -   [phone]    Electronically signed by JEFF Valentin on 1/4/2024 at 8:36 AM        Update at 0852:  Callback received from Esme who reported that patient is a Long-Term Care resident at Home at [facility] and can return upon discharge. Esme requested that she be informed if patient is recommended for skilled therapy as she will need to start a pre-cert, but that this will not hold up discharge and patient can still return to the facility if a pre-cert was pending.      IA ToureS, [facility]  Social Work -   [phone]    Electronically signed by JEFF Valentin on 1/4/2024 at 12:13 PM

## 2024-01-04 NOTE — PLAN OF CARE
Problem: Skin/Tissue Integrity  Goal: Absence of new skin breakdown  Description: 1.  Monitor for areas of redness and/or skin breakdown  2.  Assess vascular access sites hourly  3.  Every 4-6 hours minimum:  Change oxygen saturation probe site  4.  Every 4-6 hours:  If on nasal continuous positive airway pressure, respiratory therapy assess nares and determine need for appliance change or resting period.  Outcome: Progressing     Problem: Safety - Adult  Goal: Free from fall injury  Outcome: Progressing     Problem: Confusion  Goal: Confusion, delirium, dementia, or psychosis is improved or at baseline  Description: INTERVENTIONS:  1. Assess for possible contributors to thought disturbance, including medications, impaired vision or hearing, underlying metabolic abnormalities, dehydration, psychiatric diagnoses, and notify attending LIP  2. [place] high risk fall precautions, as indicated  3. Provide frequent short contacts to provide reality reorientation, refocusing and direction  4. Decrease environmental stimuli, including noise as appropriate  5. Monitor and intervene to maintain adequate nutrition, hydration, elimination, sleep and activity  6. If unable to ensure safety without constant attention obtain sitter and review sitter guidelines with assigned personnel  7. Initiate Psychosocial CNS and Spiritual Care consult, as indicated  Outcome: Progressing

## 2024-01-05 LAB
ANION GAP SERPL CALCULATED.3IONS-SCNC: 9 MMOL/L (ref 3–16)
BUN SERPL-MCNC: 17 MG/DL (ref 7–20)
CALCIUM SERPL-MCNC: 8.6 MG/DL (ref 8.3–10.6)
CHLORIDE SERPL-SCNC: 107 MMOL/L (ref 99–110)
CO2 SERPL-SCNC: 24 MMOL/L (ref 21–32)
CREAT SERPL-MCNC: 0.9 MG/DL (ref 0.8–1.3)
DEPRECATED RDW RBC AUTO: 13.2 % (ref 12.4–15.4)
GFR SERPLBLD CREATININE-BSD FMLA CKD-EPI: >60 ML/MIN/{1.73_M2}
GLUCOSE SERPL-MCNC: 97 MG/DL (ref 70–99)
HCT VFR BLD AUTO: 36 % (ref 40.5–52.5)
HGB BLD-MCNC: 12 G/DL (ref 13.5–17.5)
MCH RBC QN AUTO: 31.3 PG (ref 26–34)
MCHC RBC AUTO-ENTMCNC: 33.5 G/DL (ref 31–36)
MCV RBC AUTO: 93.5 FL (ref 80–100)
PLATELET # BLD AUTO: 372 K/UL (ref 135–450)
PMV BLD AUTO: 8.9 FL (ref 5–10.5)
POTASSIUM SERPL-SCNC: 3.8 MMOL/L (ref 3.5–5.1)
RBC # BLD AUTO: 3.85 M/UL (ref 4.2–5.9)
SODIUM SERPL-SCNC: 140 MMOL/L (ref 136–145)
WBC # BLD AUTO: 17.9 K/UL (ref 4–11)

## 2024-01-05 PROCEDURE — 2580000003 HC RX 258: Performed by: INTERNAL MEDICINE

## 2024-01-05 PROCEDURE — 1200000000 HC SEMI PRIVATE

## 2024-01-05 PROCEDURE — 6360000002 HC RX W HCPCS: Performed by: INTERNAL MEDICINE

## 2024-01-05 PROCEDURE — 85027 COMPLETE CBC AUTOMATED: CPT

## 2024-01-05 PROCEDURE — 97161 PT EVAL LOW COMPLEX 20 MIN: CPT

## 2024-01-05 PROCEDURE — 36415 COLL VENOUS BLD VENIPUNCTURE: CPT

## 2024-01-05 PROCEDURE — 97166 OT EVAL MOD COMPLEX 45 MIN: CPT

## 2024-01-05 PROCEDURE — 6370000000 HC RX 637 (ALT 250 FOR IP): Performed by: INTERNAL MEDICINE

## 2024-01-05 PROCEDURE — 97530 THERAPEUTIC ACTIVITIES: CPT

## 2024-01-05 PROCEDURE — 92526 ORAL FUNCTION THERAPY: CPT

## 2024-01-05 PROCEDURE — 97535 SELF CARE MNGMENT TRAINING: CPT

## 2024-01-05 PROCEDURE — 6360000002 HC RX W HCPCS: Performed by: NURSE PRACTITIONER

## 2024-01-05 PROCEDURE — 80048 BASIC METABOLIC PNL TOTAL CA: CPT

## 2024-01-05 PROCEDURE — 97116 GAIT TRAINING THERAPY: CPT

## 2024-01-05 RX ORDER — ATENOLOL 50 MG/1
50 TABLET ORAL DAILY
Status: DISCONTINUED | OUTPATIENT
Start: 2024-01-05 | End: 2024-01-07

## 2024-01-05 RX ORDER — ATENOLOL 50 MG/1
50 TABLET ORAL DAILY
Status: DISCONTINUED | OUTPATIENT
Start: 2024-01-05 | End: 2024-01-05

## 2024-01-05 RX ORDER — ATENOLOL 25 MG/1
25 TABLET ORAL ONCE
Status: DISCONTINUED | OUTPATIENT
Start: 2024-01-05 | End: 2024-01-05

## 2024-01-05 RX ORDER — ATENOLOL 50 MG/1
50 TABLET ORAL DAILY
Status: DISCONTINUED | OUTPATIENT
Start: 2024-01-06 | End: 2024-01-05

## 2024-01-05 RX ADMIN — ENOXAPARIN SODIUM 40 MG: 100 INJECTION SUBCUTANEOUS at 11:51

## 2024-01-05 RX ADMIN — HYDRALAZINE HYDROCHLORIDE 10 MG: 20 INJECTION INTRAMUSCULAR; INTRAVENOUS at 20:26

## 2024-01-05 RX ADMIN — HYDRALAZINE HYDROCHLORIDE 10 MG: 20 INJECTION INTRAMUSCULAR; INTRAVENOUS at 04:39

## 2024-01-05 RX ADMIN — TIMOLOL MALEATE 1 DROP: 5 SOLUTION OPHTHALMIC at 11:52

## 2024-01-05 RX ADMIN — ATORVASTATIN CALCIUM 80 MG: 80 TABLET, FILM COATED ORAL at 11:51

## 2024-01-05 RX ADMIN — LISINOPRIL 20 MG: 20 TABLET ORAL at 11:51

## 2024-01-05 RX ADMIN — CARBOXYMETHYLCELLULOSE SODIUM 2 DROP: 10 GEL OPHTHALMIC at 14:00

## 2024-01-05 RX ADMIN — CEFEPIME 2000 MG: 2 INJECTION, POWDER, FOR SOLUTION INTRAVENOUS at 13:36

## 2024-01-05 RX ADMIN — DORZOLAMIDE HYDROCHLORIDE 1 DROP: 20 SOLUTION/ DROPS OPHTHALMIC at 20:07

## 2024-01-05 RX ADMIN — CARBOXYMETHYLCELLULOSE SODIUM 2 DROP: 10 GEL OPHTHALMIC at 20:06

## 2024-01-05 RX ADMIN — DORZOLAMIDE HYDROCHLORIDE 1 DROP: 20 SOLUTION/ DROPS OPHTHALMIC at 11:52

## 2024-01-05 RX ADMIN — ASPIRIN 81 MG: 81 TABLET, CHEWABLE ORAL at 11:51

## 2024-01-05 RX ADMIN — CARBOXYMETHYLCELLULOSE SODIUM 2 DROP: 10 GEL OPHTHALMIC at 09:00

## 2024-01-05 RX ADMIN — ATENOLOL 25 MG: 25 TABLET ORAL at 11:51

## 2024-01-05 RX ADMIN — ATENOLOL 50 MG: 50 TABLET ORAL at 15:00

## 2024-01-05 RX ADMIN — VANCOMYCIN HYDROCHLORIDE 1250 MG: 1.25 INJECTION, POWDER, LYOPHILIZED, FOR SOLUTION INTRAVENOUS at 11:55

## 2024-01-05 RX ADMIN — BRIMONIDINE TARTRATE 1 DROP: 2 SOLUTION OPHTHALMIC at 11:52

## 2024-01-05 RX ADMIN — LATANOPROST 1 DROP: 50 SOLUTION OPHTHALMIC at 20:08

## 2024-01-05 RX ADMIN — TIMOLOL MALEATE 1 DROP: 5 SOLUTION OPHTHALMIC at 20:04

## 2024-01-05 RX ADMIN — BRIMONIDINE TARTRATE 1 DROP: 2 SOLUTION OPHTHALMIC at 20:09

## 2024-01-05 RX ADMIN — MIRTAZAPINE 7.5 MG: 15 TABLET, FILM COATED ORAL at 20:08

## 2024-01-05 NOTE — PLAN OF CARE
Problem: Skin/Tissue Integrity  Goal: Absence of new skin breakdown  Description: 1.  Monitor for areas of redness and/or skin breakdown  2.  Assess vascular access sites hourly  3.  Every 4-6 hours minimum:  Change oxygen saturation probe site  4.  Every 4-6 hours:  If on nasal continuous positive airway pressure, respiratory therapy assess nares and determine need for appliance change or resting period.  1/5/2024 0641 by Rosi Rodarte RN  Outcome: Progressing  1/4/2024 1711 by Krista Nichols RN  Outcome: Progressing     Problem: Safety - Adult  Goal: Free from fall injury  1/5/2024 0641 by Rosi Rodarte RN  Outcome: Progressing    Problem: Confusion  Goal: Confusion, delirium, dementia, or psychosis is improved or at baseline  Description: INTERVENTIONS:  1. Assess for possible contributors to thought disturbance, including medications, impaired vision or hearing, underlying metabolic abnormalities, dehydration, psychiatric diagnoses, and notify attending LIP  2. [place] high risk fall precautions, as indicated  3. Provide frequent short contacts to provide reality reorientation, refocusing and direction  4. Decrease environmental stimuli, including noise as appropriate  5. Monitor and intervene to maintain adequate nutrition, hydration, elimination, sleep and activity  6. If unable to ensure safety without constant attention obtain sitter and review sitter guidelines with assigned personnel  7. Initiate Psychosocial CNS and Spiritual Care consult, as indicated  1/5/2024 0641 by Rosi Rodarte RN  Outcome: Progressing       Problem: Neurosensory - Adult  Goal: Achieves stable or improved neurological status  1/5/2024 0641 by Rosi Rodarte RN  Outcome: Progressing    Problem: Respiratory - Adult  Goal: Achieves optimal ventilation and oxygenation  1/5/2024 0641 by Rosi Rodarte RN  Outcome: Progressing    Problem: Musculoskeletal - Adult  Goal: Return mobility to safest level of function  1/5/2024 0641 by

## 2024-01-06 LAB
BACTERIA UR CULT: ABNORMAL
BACTERIA UR CULT: ABNORMAL
DEPRECATED RDW RBC AUTO: 13.2 % (ref 12.4–15.4)
HCT VFR BLD AUTO: 37.2 % (ref 40.5–52.5)
HGB BLD-MCNC: 12.5 G/DL (ref 13.5–17.5)
MCH RBC QN AUTO: 31.5 PG (ref 26–34)
MCHC RBC AUTO-ENTMCNC: 33.6 G/DL (ref 31–36)
MCV RBC AUTO: 93.6 FL (ref 80–100)
ORGANISM: ABNORMAL
ORGANISM: ABNORMAL
PLATELET # BLD AUTO: 455 K/UL (ref 135–450)
PMV BLD AUTO: 9.6 FL (ref 5–10.5)
RBC # BLD AUTO: 3.98 M/UL (ref 4.2–5.9)
VANCOMYCIN SERPL-MCNC: 8.9 UG/ML
WBC # BLD AUTO: 17.4 K/UL (ref 4–11)

## 2024-01-06 PROCEDURE — 6360000002 HC RX W HCPCS: Performed by: NURSE PRACTITIONER

## 2024-01-06 PROCEDURE — 2580000003 HC RX 258: Performed by: INTERNAL MEDICINE

## 2024-01-06 PROCEDURE — 6360000002 HC RX W HCPCS: Performed by: INTERNAL MEDICINE

## 2024-01-06 PROCEDURE — 1200000000 HC SEMI PRIVATE

## 2024-01-06 PROCEDURE — 36415 COLL VENOUS BLD VENIPUNCTURE: CPT

## 2024-01-06 PROCEDURE — 80202 ASSAY OF VANCOMYCIN: CPT

## 2024-01-06 PROCEDURE — 85027 COMPLETE CBC AUTOMATED: CPT

## 2024-01-06 PROCEDURE — 87641 MR-STAPH DNA AMP PROBE: CPT

## 2024-01-06 PROCEDURE — 6370000000 HC RX 637 (ALT 250 FOR IP): Performed by: INTERNAL MEDICINE

## 2024-01-06 RX ADMIN — LATANOPROST 1 DROP: 50 SOLUTION OPHTHALMIC at 21:17

## 2024-01-06 RX ADMIN — TIMOLOL MALEATE 1 DROP: 5 SOLUTION OPHTHALMIC at 21:18

## 2024-01-06 RX ADMIN — CEFEPIME 2000 MG: 2 INJECTION, POWDER, FOR SOLUTION INTRAVENOUS at 01:07

## 2024-01-06 RX ADMIN — BRIMONIDINE TARTRATE 1 DROP: 2 SOLUTION OPHTHALMIC at 21:17

## 2024-01-06 RX ADMIN — MIRTAZAPINE 7.5 MG: 15 TABLET, FILM COATED ORAL at 21:18

## 2024-01-06 RX ADMIN — VANCOMYCIN HYDROCHLORIDE 1250 MG: 1.25 INJECTION, POWDER, LYOPHILIZED, FOR SOLUTION INTRAVENOUS at 10:38

## 2024-01-06 RX ADMIN — CARBOXYMETHYLCELLULOSE SODIUM 2 DROP: 10 GEL OPHTHALMIC at 21:16

## 2024-01-06 RX ADMIN — DORZOLAMIDE HYDROCHLORIDE 1 DROP: 20 SOLUTION/ DROPS OPHTHALMIC at 21:17

## 2024-01-06 RX ADMIN — ATORVASTATIN CALCIUM 80 MG: 80 TABLET, FILM COATED ORAL at 10:40

## 2024-01-06 RX ADMIN — HYDRALAZINE HYDROCHLORIDE 10 MG: 20 INJECTION INTRAMUSCULAR; INTRAVENOUS at 01:24

## 2024-01-06 RX ADMIN — CEFEPIME 2000 MG: 2 INJECTION, POWDER, FOR SOLUTION INTRAVENOUS at 14:58

## 2024-01-06 RX ADMIN — HYDRALAZINE HYDROCHLORIDE 10 MG: 20 INJECTION INTRAMUSCULAR; INTRAVENOUS at 21:16

## 2024-01-06 RX ADMIN — HYDRALAZINE HYDROCHLORIDE 10 MG: 20 INJECTION INTRAMUSCULAR; INTRAVENOUS at 06:48

## 2024-01-06 RX ADMIN — ATENOLOL 50 MG: 50 TABLET ORAL at 10:40

## 2024-01-06 RX ADMIN — ASPIRIN 81 MG: 81 TABLET, CHEWABLE ORAL at 10:40

## 2024-01-06 RX ADMIN — LISINOPRIL 20 MG: 20 TABLET ORAL at 10:40

## 2024-01-06 ASSESSMENT — PAIN SCALES - GENERAL: PAINLEVEL_OUTOF10: 0

## 2024-01-06 NOTE — PLAN OF CARE
Problem: Skin/Tissue Integrity  Goal: Absence of new skin breakdown  Description: 1.  Monitor for areas of redness and/or skin breakdown  2.  Assess vascular access sites hourly  3.  Every 4-6 hours minimum:  Change oxygen saturation probe site  4.  Every 4-6 hours:  If on nasal continuous positive airway pressure, respiratory therapy assess nares and determine need for appliance change or resting period.  1/6/2024 0016 by Rosi Rodarte RN  Outcome: Progressing  1/6/2024 0015 by Rosi Rodarte RN  Outcome: Progressing     Problem: Safety - Adult  Goal: Free from fall injury  1/6/2024 0016 by Rosi Rodarte RN  Outcome: Progressing  1/6/2024 0015 by Rosi Rodarte RN  Outcome: Progressing     Problem: Confusion  Goal: Confusion, delirium, dementia, or psychosis is improved or at baseline  Description: INTERVENTIONS:  1. Assess for possible contributors to thought disturbance, including medications, impaired vision or hearing, underlying metabolic abnormalities, dehydration, psychiatric diagnoses, and notify attending LIP  2. [place] high risk fall precautions, as indicated  3. Provide frequent short contacts to provide reality reorientation, refocusing and direction  4. Decrease environmental stimuli, including noise as appropriate  5. Monitor and intervene to maintain adequate nutrition, hydration, elimination, sleep and activity  6. If unable to ensure safety without constant attention obtain sitter and review sitter guidelines with assigned personnel  7. Initiate Psychosocial CNS and Spiritual Care consult, as indicated  1/6/2024 0016 by Rosi Rodarte RN  Outcome: Progressing  1/6/2024 0015 by Rosi Rodarte RN  Outcome: Progressing     Problem: Neurosensory - Adult  Goal: Achieves stable or improved neurological status  1/6/2024 0016 by Rosi Rodarte RN  Outcome: Progressing  1/6/2024 0015 by Rosi Rodarte RN  Outcome: Progressing     Problem: Respiratory - Adult  Goal: Achieves optimal ventilation and

## 2024-01-07 VITALS
TEMPERATURE: 97.8 F | RESPIRATION RATE: 16 BRPM | HEART RATE: 64 BPM | DIASTOLIC BLOOD PRESSURE: 73 MMHG | SYSTOLIC BLOOD PRESSURE: 178 MMHG | HEIGHT: 74 IN | BODY MASS INDEX: 18.42 KG/M2 | WEIGHT: 143.5 LBS | OXYGEN SATURATION: 98 %

## 2024-01-07 LAB
ANION GAP SERPL CALCULATED.3IONS-SCNC: 10 MMOL/L (ref 3–16)
BUN SERPL-MCNC: 22 MG/DL (ref 7–20)
CALCIUM SERPL-MCNC: 8.7 MG/DL (ref 8.3–10.6)
CHLORIDE SERPL-SCNC: 109 MMOL/L (ref 99–110)
CO2 SERPL-SCNC: 21 MMOL/L (ref 21–32)
CREAT SERPL-MCNC: 1 MG/DL (ref 0.8–1.3)
GFR SERPLBLD CREATININE-BSD FMLA CKD-EPI: >60 ML/MIN/{1.73_M2}
GLUCOSE SERPL-MCNC: 95 MG/DL (ref 70–99)
POTASSIUM SERPL-SCNC: 3.9 MMOL/L (ref 3.5–5.1)
SODIUM SERPL-SCNC: 140 MMOL/L (ref 136–145)

## 2024-01-07 PROCEDURE — 36415 COLL VENOUS BLD VENIPUNCTURE: CPT

## 2024-01-07 PROCEDURE — 2580000003 HC RX 258: Performed by: INTERNAL MEDICINE

## 2024-01-07 PROCEDURE — 6360000002 HC RX W HCPCS: Performed by: INTERNAL MEDICINE

## 2024-01-07 PROCEDURE — 6360000002 HC RX W HCPCS: Performed by: NURSE PRACTITIONER

## 2024-01-07 PROCEDURE — 80048 BASIC METABOLIC PNL TOTAL CA: CPT

## 2024-01-07 PROCEDURE — 6370000000 HC RX 637 (ALT 250 FOR IP): Performed by: INTERNAL MEDICINE

## 2024-01-07 RX ORDER — CEFUROXIME AXETIL 500 MG/1
500 TABLET ORAL EVERY 12 HOURS SCHEDULED
Qty: 14 TABLET | Refills: 0 | Status: SHIPPED | OUTPATIENT
Start: 2024-01-07 | End: 2024-01-14

## 2024-01-07 RX ORDER — CEFUROXIME AXETIL 250 MG/1
500 TABLET ORAL EVERY 12 HOURS SCHEDULED
Status: DISCONTINUED | OUTPATIENT
Start: 2024-01-07 | End: 2024-01-07 | Stop reason: HOSPADM

## 2024-01-07 RX ORDER — ATENOLOL 50 MG/1
100 TABLET ORAL DAILY
Status: DISCONTINUED | OUTPATIENT
Start: 2024-01-07 | End: 2024-01-07 | Stop reason: HOSPADM

## 2024-01-07 RX ORDER — ATENOLOL 100 MG/1
100 TABLET ORAL DAILY
Qty: 30 TABLET | Refills: 0 | Status: SHIPPED | OUTPATIENT
Start: 2024-01-07

## 2024-01-07 RX ADMIN — ENOXAPARIN SODIUM 40 MG: 100 INJECTION SUBCUTANEOUS at 10:40

## 2024-01-07 RX ADMIN — HYDRALAZINE HYDROCHLORIDE 10 MG: 20 INJECTION INTRAMUSCULAR; INTRAVENOUS at 04:34

## 2024-01-07 RX ADMIN — CARBOXYMETHYLCELLULOSE SODIUM 2 DROP: 10 GEL OPHTHALMIC at 10:29

## 2024-01-07 RX ADMIN — LISINOPRIL 20 MG: 20 TABLET ORAL at 10:31

## 2024-01-07 RX ADMIN — ASPIRIN 81 MG: 81 TABLET, CHEWABLE ORAL at 10:31

## 2024-01-07 RX ADMIN — ATENOLOL 100 MG: 50 TABLET ORAL at 10:32

## 2024-01-07 RX ADMIN — CEFUROXIME AXETIL 500 MG: 250 TABLET ORAL at 10:31

## 2024-01-07 RX ADMIN — ATORVASTATIN CALCIUM 80 MG: 80 TABLET, FILM COATED ORAL at 10:32

## 2024-01-07 RX ADMIN — CEFEPIME 2000 MG: 2 INJECTION, POWDER, FOR SOLUTION INTRAVENOUS at 00:27

## 2024-01-07 NOTE — DISCHARGE INSTR - COC
Continuity of Care Form    Patient Name: Jelani Martínez   :  1940  MRN:  [id number]    Admit date:  1/3/2024  Discharge date:  2024    Code Status Order: DNR-CC   Advance Directives:     Admitting Physician:  Jomar Barrera MD  PCP: [facility]    Discharging Nurse: Triny  Discharging Hospital Unit/Room#: 5TN-5566/5566-01  Discharging Unit Phone Number: [phone]    Emergency Contact:   Extended Emergency Contact Information  Primary Emergency Contact: jose Martínez  Home Phone: [phone]  Relation: Child  Secondary Emergency Contact: israel capone  Home Phone: [phone]  Mobile Phone: [phone]  Relation: Niece/Nephew   needed? No    Past Surgical History:  Past Surgical History:   Procedure Laterality Date    LUNG REMOVAL, PARTIAL Right     noted on CT scan    TONSILLECTOMY         Immunization History:   Immunization History   Administered Date(s) Administered    COVID-19, PFIZER Bivalent, DO NOT Dilute, (age 12y+), IM, 30 mcg/0.3 mL 07/10/2023    COVID-19, PFIZER PURPLE top, DILUTE for use, (age 12 y+), 30mcg/0.3mL 2021, 2021, 2021       Active Problems:  Patient Active Problem List   Diagnosis Code    Chest pain R07.9    Unstable angina (HCC) I20.0    Hypertension I10    Dementia (HCC) F03.90    Musculoskeletal chest pain R07.89    UTI (urinary tract infection) N39.0    Pneumonia J18.9    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

## 2024-01-07 NOTE — PLAN OF CARE
Problem: Safety - Adult  Goal: Free from fall injury  Outcome: Progressing   Pt has been free from falls this shift, bed alarm on, bed in lowest position, 2/4 side rails up, nonskid socks on, wheels locked, bedside table and call light in reach. Encouraged pt to call out if needed anything.     Problem: Pain  Goal: Verbalizes/displays adequate comfort level or baseline comfort level  Outcome: Progressing   Pt denies pain at this time, RN encouraged pt to call out if needed anything. Will continue to assess pain level throughout shift.

## 2024-01-07 NOTE — PROGRESS NOTES
Clinical Pharmacy Note: Pharmacy to Dose Vancomycin    Jelani Martínez is a 83 y.o. male started on Vancomycin for HAP; consult received from Dr. Barrera to manage therapy. Also receiving the following antibiotics: Cefepime.    Goal AUC: 400-600 mg/L*hr  Goal Trough Level: 15-20 mcg/mL    Assessment/Plan:  Initiate vancomycin 1250 mg IV every 24 hours. Bayesian modeling predicts an AUC of 470 mg/L*hr and a trough of 11.2 mcg/mL at steady state concentration.  A vancomycin random level has been ordered for 1/6 at 0600  Changes in regimen will be determined based on culture results, renal function, and clinical response.  Pharmacy will continue to monitor and adjust regimen as necessary.    Allergies:  Patient has no known allergies.     Recent Labs     01/04/24  0649 01/05/24  0538   CREATININE 1.1 0.9       Recent Labs     01/04/24  0649 01/05/24  0538   WBC 13.1* 17.9*       Ht Readings from Last 1 Encounters:   01/05/24 1.88 m (6' 2.02\")        Wt Readings from Last 1 Encounters:   01/05/24 65.1 kg (143 lb 8 oz)         Estimated Creatinine Clearance: 55 mL/min (based on SCr of 0.9 mg/dL).      Thank you for the consult,    Arlene Francis [facility]  [id number]    
    Clinical Pharmacy Note: Pharmacy to Dose Vancomycin    Vancomycin Day: 2  Indication: HAP  Current Dose: 1250 mg every 24 hours   Dosing Method: Bayesian Modeling    Random: 8.9    Recent Labs     01/04/24  0649 01/05/24  0538   BUN 25* 17       Recent Labs     01/04/24  0649 01/05/24  0538   CREATININE 1.1 0.9       Recent Labs     01/04/24  0649 01/05/24  0538   WBC 13.1* 17.9*         Intake/Output Summary (Last 24 hours) at 1/6/2024 0737  Last data filed at 1/6/2024 0618  Gross per 24 hour   Intake --   Output 150 ml   Net -150 ml         Ht Readings from Last 1 Encounters:   01/05/24 1.88 m (6' 2.02\")        Wt Readings from Last 1 Encounters:   01/05/24 65.1 kg (143 lb 8 oz)         Body mass index is 18.42 kg/m².    Estimated Creatinine Clearance: 55 mL/min (based on SCr of 0.9 mg/dL).      Assessment/Plan:  Vancomycin level is currently subtherapeutic, but this will continue to rise and become therapeutic once steady state is achieved.  Continue current vancomycin regimen of 1250 mg every 12 hours.   Bayesian Modeling predicts an AUC of 522 mg/L*hr and trough of 13 mg/L.  A vancomycin random level has been ordered on 1/9 at 0600 for follow-up.  Changes in regimen will be determined based on culture results, renal function, and clinical response.  Pharmacy will continue to monitor and adjust regimen as necessary.    Thank you for the consult,    Gentry Ulloa, PharmD [facility]  PGY-1 Resident  [id number]   
  Hospital Problems             Last Modified POA    * (Principal) UTI (urinary tract infection) 1/3/2024 Yes    Hypertension 1/3/2024 Yes    Pneumonia 1/3/2024 Yes    Late effects of cerebral ischemic stroke 1/3/2024 Yes    Emphysema, unspecified (HCC) 1/3/2024 Yes    Altered mental status 1/3/2024 Yes    Benign prostatic hyperplasia without lower urinary tract symptoms 1/3/2024 Yes    Lung mass 1/3/2024 Yes    Severe vascular dementia (HCC) 1/3/2024 Yes   H&P dictated       
  [facility] - Inpatient Rehabilitation Department   Phone: [phone]    Occupational Therapy    [x] Initial Evaluation            [] Daily Treatment Note         [] Discharge Summary      Patient: Jelani Martínez   : 1940   MRN: [id number]   Date of Service:  2024    Admitting Diagnosis:  UTI (urinary tract infection)  Current Admission Summary: Per MD notes: The patient is an 83-year-old black  American gentleman with moderate dementia, came to the emergency room  from Home at [facility] with altered mental status.  The patient has  been sick according to nursing staff.  The patient may have recently had  RSV infection.  According to the son, the patient has had recurrent  urinary tract infection.  At this time, he appears like he has had a bad  one with a change in mental status, poor appetite, overall failure to  thrive and decreased functionality.  The patient is nonambulatory, has  postinfarct dementia.  Past Medical History:  has a past medical history of Acute prostatitis, Benign prostatic hyperplasia without lower urinary tract symptoms, Carpal tunnel syndrome, Cerebral infarction (HCC), Cognitive communication deficit, COVID-19, Emphysema, unspecified (HCC), Hyperlipidemia, Hypertension, Muscle weakness, Neuralgia and neuritis, Nicotine dependence, Polyosteoarthritis, Radiculopathy, TIA (transient ischemic attack), and Urge incontinence.  Past Surgical History:  has a past surgical history that includes Tonsillectomy and Lung removal, partial (Right).    Discharge Recommendations: Jelani Martínez scored a 8/24 on the AM-PAC ADL Inpatient form. Current research shows that an AM-PAC score of 17 or less is typically not associated with a discharge to the patient's home setting. Based on the patient's AM-PAC score and their current ADL deficits, it is recommended that the patient have 3-5 sessions per week of Occupational Therapy at d/c to increase the patient's independence.  
  [facility] - Inpatient Rehabilitation Department   Phone: [phone]    Physical Therapy    [x] Initial Evaluation            [] Daily Treatment Note         [] Discharge Summary      Patient: Jelani Martínez   : 1940   MRN: [id number]   Date of Service:  2024  Admitting Diagnosis: UTI (urinary tract infection)  Current Admission Summary: Per H&P on 1/3 \"83-year-old black  American gentleman with moderate dementia, came to the emergency room from Home at [facility] with altered mental status.  The patient has been sick according to nursing staff.  The patient may have recently had RSV infection.  According to the son, the patient has had recurrent urinary tract infection.  At this time, he appears like he has had a bad one with a change in mental status, poor appetite, overall failure to thrive and decreased functionality.  The patient is nonambulatory, has post-infarct dementia.\"  Past Medical History:  has a past medical history of Acute prostatitis, Benign prostatic hyperplasia without lower urinary tract symptoms, Carpal tunnel syndrome, Cerebral infarction (HCC), Cognitive communication deficit, COVID-19, Emphysema, unspecified (HCC), Hyperlipidemia, Hypertension, Muscle weakness, Neuralgia and neuritis, Nicotine dependence, Polyosteoarthritis, Radiculopathy, TIA (transient ischemic attack), and Urge incontinence.  Past Surgical History:  has a past surgical history that includes Tonsillectomy and Lung removal, partial (Right).    Discharge Recommendations: Jelani Martínez scored a 16/24 on the AM-PAC short mobility form. Current research shows that an AM-PAC score of 17 or less is typically not associated with a discharge to the patient's home setting. Based on the patient's AM-PAC score and their current functional mobility deficits, it is recommended that the patient have 3-5 sessions per week of Physical Therapy at d/c to increase the patient's independence.  Please see 
4 Eyes Skin Assessment     NAME:  Jelani Martínez  YOB: 1940  MEDICAL RECORD NUMBER:  [id number]    The patient is being assessed for  Admission    I agree that at least one RN has performed a thorough Head to Toe Skin Assessment on the patient. ALL assessment sites listed below have been assessed.      Areas assessed by both nurses:    Head, Face, Ears        Does the Patient have a Wound? No noted wound(s)       Navarro Prevention initiated by RN: Yes  Wound Care Orders initiated by RN: No    Pressure Injury (Stage 3,4, Unstageable, DTI, NWPT, and Complex wounds) if present, place Wound referral order by RN under : No    New Ostomies, if present place, Ostomy referral order under : No     Nurse 1 eSignature: Electronically signed by Madai Jamison RN on 1/4/24 at 12:46 AM EST    **SHARE this note so that the co-signing nurse can place an eSignature**    Nurse 2 eSignature: Electronically signed by Sydney Harkins RN on 1/4/24 at 12:47 AM EST    
Assessment complete. VSS. Patient resting in bed, eyes closed. Respirations even and easy. Call light in reach. Fall precautions in place. No needs expressed at this time, appears to be sleeping. Wedge pillow in place.   
Assessment complete. VSS. Pt alert to self, confused to time and place. Patient resting in bed. Respirations even and easy. Call light in reach. Fall precautions in place. No needs expressed at this time.   
Assessment completed, see doc flowsheets. Pt is A&O X1. Lung sounds are clear. VSS. Medication given per MAR. Patient has no needs at this time. Call light within in reach, will continue to monitor.     
Department of Internal Medicine  General Internal Medicine   Progress Note      SUBJECTIVE: calm comfortable sitting up in chair eating dinner well without distress     History obtained from chart review, the patient, and nursing staff   General ROS: positive for  - fatigue, malaise, sleep disturbance, and weight loss  negative for - chills, fever, or night sweats  Psychological ROS: negative  Ophthalmic ROS: negative  Respiratory ROS: positive for - cough and shortness of breath  negative for - hemoptysis, sputum changes, stridor, or wheezing  Cardiovascular ROS: positive for - dyspnea on exertion  negative for - chest pain, loss of consciousness, orthopnea, or palpitations  Gastrointestinal ROS: no abdominal pain, change in bowel habits, or black or bloody stools  Genito-Urinary ROS: dysuria and frequency and no hematuria  Musculoskeletal ROS: negative  Neurological ROS: no TIA or stroke symptoms  Dermatological ROS: negative    OBJECTIVE      Medications      Current Facility-Administered Medications: ceFEPIme (MAXIPIME) 2,000 mg in sodium chloride 0.9 % 100 mL IVPB (mini-bag), 2,000 mg, IntraVENous, Q12H  vancomycin (VANCOCIN) 1,250 mg in sodium chloride 0.9 % 250 mL IVPB ([id number]), 1,250 mg, IntraVENous, Q24H  atenolol (TENORMIN) tablet 50 mg, 50 mg, Oral, Daily  hydrALAZINE (APRESOLINE) injection 10 mg, 10 mg, IntraVENous, Q6H PRN  enoxaparin (LOVENOX) injection 40 mg, 40 mg, SubCUTAneous, Daily  acetaminophen (TYLENOL) tablet 1,000 mg, 1,000 mg, Oral, Q6H PRN  aspirin chewable tablet 81 mg, 81 mg, Oral, Daily  atorvastatin (LIPITOR) tablet 80 mg, 80 mg, Oral, Daily  brimonidine (ALPHAGAN) 0.2 % ophthalmic solution 1 drop, 1 drop, Both Eyes, BID  carboxymethylcellulose PF (THERATEARS/REFRESH) 1 % ophthalmic gel 2 drop, 2 drop, Both Eyes, TID  guaiFENesin (ROBITUSSIN) 100 MG/5ML liquid 200 mg, 200 mg, Oral, 4x Daily PRN  latanoprost (XALATAN) 0.005 % ophthalmic solution 1 drop, 1 drop, Both Eyes, 
Department of Internal Medicine  General Internal Medicine   Progress Note      SUBJECTIVE: more alert and  coherent no resp distress     History obtained from chart review, the patient, and nursing staff   General ROS: positive for  - fatigue, malaise, sleep disturbance, and weight loss  negative for - chills, fever, or night sweats  Psychological ROS: negative  Ophthalmic ROS: negative  Respiratory ROS: positive for - cough and shortness of breath  negative for - hemoptysis, sputum changes, stridor, or wheezing  Cardiovascular ROS: positive for - dyspnea on exertion  negative for - chest pain, loss of consciousness, orthopnea, or palpitations  Gastrointestinal ROS: no abdominal pain, change in bowel habits, or black or bloody stools  Genito-Urinary ROS: dysuria and frequency and no hematuria  Musculoskeletal ROS: negative  Neurological ROS: no TIA or stroke symptoms  Dermatological ROS: negative    OBJECTIVE      Medications      Current Facility-Administered Medications: ceFEPIme (MAXIPIME) 2,000 mg in sodium chloride 0.9 % 100 mL IVPB (mini-bag), 2,000 mg, IntraVENous, Q12H  vancomycin (VANCOCIN) 1,250 mg in sodium chloride 0.9 % 250 mL IVPB ([id number]), 1,250 mg, IntraVENous, Q24H  atenolol (TENORMIN) tablet 50 mg, 50 mg, Oral, Daily  hydrALAZINE (APRESOLINE) injection 10 mg, 10 mg, IntraVENous, Q6H PRN  enoxaparin (LOVENOX) injection 40 mg, 40 mg, SubCUTAneous, Daily  acetaminophen (TYLENOL) tablet 1,000 mg, 1,000 mg, Oral, Q6H PRN  aspirin chewable tablet 81 mg, 81 mg, Oral, Daily  atorvastatin (LIPITOR) tablet 80 mg, 80 mg, Oral, Daily  brimonidine (ALPHAGAN) 0.2 % ophthalmic solution 1 drop, 1 drop, Both Eyes, BID  carboxymethylcellulose PF (THERATEARS/REFRESH) 1 % ophthalmic gel 2 drop, 2 drop, Both Eyes, TID  guaiFENesin (ROBITUSSIN) 100 MG/5ML liquid 200 mg, 200 mg, Oral, 4x Daily PRN  latanoprost (XALATAN) 0.005 % ophthalmic solution 1 drop, 1 drop, Both Eyes, Nightly  lisinopril (PRINIVIL;ZESTRIL) 
Facility/Department: [address] ONCOLOGY  Speech Language Pathology  DYSPHAGIA BEDSIDE SWALLOW EVALUATION     Patient: Jelani Martínez   : 1940   MRN: [id number]      Evaluation Date: 2024   Admitting Diagnosis: UTI (urinary tract infection) [N39.0]  Urinary tract infection without hematuria, site unspecified [N39.0]  Pain: Denies                                                       H&P: 1/3/2024  \"The patient is an 83-year-old black American gentleman with moderate dementia, came to the emergency room from Home at [facility] with altered mental status.  The patient has been sick according to nursing staff.  The patient may have recently had RSV infection.  According to the son, the patient has had recurrent urinary tract infection.  At this time, he appears like he has had a bad one with a change in mental status, poor appetite, overall failure to thrive and decreased functionality.  The patient is nonambulatory, has postinfarct dementia.\"    Imaging:  Chest X-ray: 1/3/2024  IMPRESSION:  1. Patchy opacity in the right lower lung, concerning for developing  pneumonia.  2. Interval increased atelectasis in the left lung base.  3. Similar masslike density in the peripheral left lung.    History/Prior Level of Function:   Living Status: Home at [facility]   Prior Dysphagia History: No previous speech therapy at this facility per chart review. Per hard chart, pt on a regular diet with thin liquids at facility.   Reason for referral: SLP evaluation orders received due to dysphagia risk    Dysphagia Impressions/Diagnosis: Oropharyngeal Dysphagia   Pt alert and responsive, with impaired comprehension of questions and commands; pt with dementia at baseline per chart. Pt resting in bed, leaning to left and repositioned more upright. On room air with RR <20/min. Oral motor exam and laryngeal function exam limited as pt did not consistently follow commands. Volitional cough and vocal quality were weak. 
Facility/Department: [address] ONCOLOGY  Speech Language Pathology   Dysphagia Treatment Note    Patient: Jelani Martínez   : 1940   MRN: [id number]      Evaluation Date: 2024      Admitting Dx: UTI (urinary tract infection) [N39.0]  Urinary tract infection without hematuria, site unspecified [N39.0]  Treatment Diagnosis: Oropharyngeal Dysphagia   Pain: Did not state                                              Diet and Treatment Recommendations 2024:  Diet Solids Recommendation:  Dysphagia II Minced and Moist  Liquid Consistency Recommendation:  Thin liquids  Recommended form of Meds: Meds in puree       If respiratory status declines, recommend NPO with ongoing SLP assessment.     Compensatory strategies:   Upright as possible with all PO intake , Assist Feed , Small bites/sips , Eat/feed slowly, Remain upright 30-45 min     Assessment of Texture Tolerance:  Diet level prior to treatment: Dysphagia II Minced and Moist , Thin liquids   Tolerance of Current Diet Level:Per chart, no noted difficulty with current diet level     Impressions: Pt was positioned Upright in chair, awake and alert but disoriented and required simplified instructions and education. Difficult to discern this in relation to his baseline based upon history of dementia. He is currently on room air. Trials of thin liquids and soft solids were provided to assess swallow function. His overall intake was limited due to cognitive state.   Pt demonstrated reduced mastication of soft solids with indication of pharyngeal stasis in relation to a delayed cough with bolus expulsion. Timing and strength of laryngeal elevation was delayed with thin liquid presentations and required verbal cues for small single sips. He demonstrated slightly improved engagement with eating task, required steadying and initiation cues for eating.     Pt with clinical s/s oropharyngeal dysphagia at bedside. Dysphagia risk factors include impaired cognition, 
NP is notified about pt DNR-CC paperwork sent from [facility]. Per NP, pt is not A&O nor family is present here. Keep code status as Full as ordered by attending at admission.   
Nutrition Note    RECOMMENDATIONS  PO Diet: continue dysphagia minced and moist  ONS: Ensure Plus High Protein BID      NUTRITION ASSESSMENT   Consult received for poor intake / appetite for 5 or more days.  Pt from [facility] and only oriented to person.  Currently on a dysphagia minced and moist diet per SLP recommendations, no PO intake recorded.   lb, admitted at 138 lb and noted I/O's +1800 mL.  Will begin Ensure Plush High Protein and monitor intake of meals and supplement.     Nutrition Related Findings: oriented to person only; on remeron  Wounds: None  Nutrition Education:  Education not indicated   Nutrition Goals: PO intake 50% or greater, prior to discharge     MALNUTRITION ASSESSMENT   Acute Illness  Malnutrition Status: At risk for malnutrition (Comment)  Findings of the 6 clinical characteristics of malnutrition:  Energy Intake:  Unable to assess  Weight Loss:  No significant weight loss     Body Fat Loss:  Mild body fat loss Orbital, Triceps   Muscle Mass Loss:  Mild muscle mass loss Temples (temporalis), Clavicles (pectoralis & deltoids), Calf (gastrocnemius)  Fluid Accumulation:  No significant fluid accumulation     Strength:  Not Performed      NUTRITION DIAGNOSIS   Inadequate oral intake related to cognitive or neurological impairment, inadequate protein-energy intake as evidenced by intake 0-25%      CURRENT NUTRITION THERAPIES  ADULT DIET; Dysphagia - Minced and Moist     PO Intake: Unable to assess   PO Supplement Intake:None Ordered      ANTHROPOMETRICS  Current Height: 188 cm (6' 2.02\")  Current Weight - Scale: 65.1 kg (143 lb 8 oz)    Admission weight: 62.7 kg (138 lb 3.7 oz)  Ideal Body Weight (IBW): 190 lbs  (86 kg)        BMI: 18.4      COMPARATIVE STANDARDS  Total Energy Requirements (kcals/day): 1881 - 2195     Protein (g):  94       Fluid (mL/day):  1881 - 2195    The patient will be monitored per nutrition standards of care. Consult dietitian if additional nutrition 
Pharmacy Home Medication Reconciliation Note    A medication reconciliation has been completed for Jelani Martínez 1940    Pharmacy: [facility] [address] [place], [place]    Information provided by: Facility    The patient's home medication list is as follows:  No current facility-administered medications on file prior to encounter.     Current Outpatient Medications on File Prior to Encounter   Medication Sig Dispense Refill    brimonidine (ALPHAGAN) 0.2 % ophthalmic solution Place 1 drop into both eyes 2 times daily      dorzolamide-timolol (COSOPT) 2-0.5 % ophthalmic solution Place 1 drop into both eyes 2 times daily      guaiFENesin (ROBITUSSIN) 100 MG/5ML liquid Take 10 mLs by mouth 4 times daily as needed for Cough      latanoprost (XALATAN) 0.005 % ophthalmic solution Place 1 drop into both eyes nightly      mirtazapine (REMERON) 7.5 MG tablet Take 1 tablet by mouth nightly      dextran 70-hypromellose (NATURAL BALANCE TEARS) 0.1-0.3 % SOLN opthalmic solution Place 2 drops into both eyes 3 times daily      ondansetron (ZOFRAN) 4 MG tablet Take 1 tablet by mouth every 8 hours as needed for Nausea or Vomiting      atorvastatin (LIPITOR) 80 MG tablet Take 1 tablet by mouth daily      amLODIPine (NORVASC) 10 MG tablet Take 1 tablet by mouth daily (Patient not taking: Reported on 1/3/2024)      nitroGLYCERIN (NITROSTAT) 0.4 MG SL tablet up to max of 3 total doses. If no relief after 1 dose, call 911. (Patient not taking: Reported on 1/3/2024) 25 tablet 3    atenolol (TENORMIN) 25 MG tablet       donepezil (ARICEPT) 10 MG tablet  (Patient not taking: Reported on 1/3/2024)      lisinopril (PRINIVIL;ZESTRIL) 20 MG tablet Take 1 tablet by mouth daily      albuterol sulfate HFA (PROVENTIL;VENTOLIN;PROAIR) 108 (90 Base) MCG/ACT inhaler Inhale 2 puffs into the lungs every 4 hours as needed for Wheezing (Patient not taking: Reported on 1/3/2024)      aspirin 81 MG chewable tablet Take 1 
Pt BP high above 170, prn hydralazine given per orders which did bring BP down. BP came down for short time. Then elevated again to 179/75. Provider notified as it was not time to give another PRN medication. Provider Christine Ayala NP said continue to monitor and hydralazine per orders.   
Pt alert and oriented x1, VSS with the exception of elevated bp. RN gave prn hydralazine x2 to help lower bp. BP trending down. Pt cooperative throughout shift. Lungs are clear and diminished. Pt voiding per purewick. Bed alarm on, bedside table and call light in reach.   
Report called to facility, no answer.  
Shift assessment completed. Routine vitals obtained. Patient not cooperative, refused most of his eyes drops. Scheduled medications given with family help. Patient is awake and  alert. Respirations are easy and unlabored. Patient does not appear to be in distress, resting comfortably at this time. Call light within reach. Fall precautions in place.  
Shift assessment completed. Routine vitals obtained. Scheduled medications given. Patient refused eyes drops. Patient is awake, alert and oriented. Respirations are easy and unlabored. Patient does not appear to be in distress, resting comfortably at this time. Call light within reach. Fall precautions in place.   
   Mucus, UA Present (A) None Seen   Culture, Urine    Collection Time: 01/03/24  5:23 PM    Specimen: Urine, clean catch   Result Value Ref Range    Organism Oligella urethralis (A)     Urine Culture, Routine >100,000 CFU/ml  No further workup       Organism Klebsiella pneumoniae (A)     Urine Culture, Routine >100,000 CFU/ml        Susceptibility    Klebsiella pneumoniae - BACTERIAL SUSCEPTIBILITY PANEL BY ANTONIO     ampicillin  Resistant mcg/mL     ampicillin-sulbactam 4 Sensitive mcg/mL     ceFAZolin* <=4 Sensitive mcg/mL      * NOTE: Cefazolin should only be used for uncomplicated UTI        for E.coli or Klebsiella pneumoniae.       cefTRIAXone <=0.25 Sensitive mcg/mL     ciprofloxacin <=0.25 Sensitive mcg/mL     ertapenem <=0.12 Sensitive mcg/mL     gentamicin <=1 Sensitive mcg/mL     nitrofurantoin <=16 Sensitive mcg/mL     trimethoprim-sulfamethoxazole <=20 Sensitive mcg/mL   SPECIMEN REJECTION    Collection Time: 01/03/24  6:13 PM   Result Value Ref Range    Rejected Test cmpx     Reason for Rejection see below    Comprehensive Metabolic Panel w/ Reflex to MG    Collection Time: 01/03/24  6:33 PM   Result Value Ref Range    Sodium 140 136 - 145 mmol/L    Potassium reflex Magnesium 4.5 3.5 - 5.1 mmol/L    Chloride 107 99 - 110 mmol/L    CO2 24 21 - 32 mmol/L    Anion Gap 9 3 - 16    Glucose 108 (H) 70 - 99 mg/dL    BUN 30 (H) 7 - 20 mg/dL    Creatinine 1.2 0.8 - 1.3 mg/dL    Est, Glom Filt Rate 59 (A) >60    Calcium 8.7 8.3 - 10.6 mg/dL    Total Protein 7.6 6.4 - 8.2 g/dL    Albumin 2.6 (L) 3.4 - 5.0 g/dL    Albumin/Globulin Ratio 0.5 (L) 1.1 - 2.2    Total Bilirubin 0.7 0.0 - 1.0 mg/dL    Alkaline Phosphatase 135 (H) 40 - 129 U/L    ALT 75 (H) 10 - 40 U/L     (H) 15 - 37 U/L   POCT Glucose    Collection Time: 01/03/24 10:50 PM   Result Value Ref Range    POC Glucose 87 70 - 99 mg/dl    Performed on ACCU-CHEK    POCT Glucose    Collection Time: 01/04/24  3:42 AM   Result Value Ref Range    POC Glucose

## 2024-01-07 NOTE — CARE COORDINATION
Discharge Plan:     Patient discharged to:    [facility]  [address] [place] [address]    SW/DC Planner faxed, CHE and AVS to:  [phone]    Narcotic Prescriptions faxed were: N/A    RN: will call report to: [phone]      Medical Transport with: [facility] [phone]     time: 1500    Family advised of discharge?: yes, Cole Martínez, melinda SANTIAGO Submitted?:  N/A    All discharge needs met per case management.    YIMI StinsonN RN    [facility]  Phone: [phone]

## 2024-01-09 LAB — MRSA DNA SPEC QL NAA+PROBE: NORMAL

## 2024-02-02 PROBLEM — N39.0 UTI (URINARY TRACT INFECTION): Status: RESOLVED | Noted: 2024-01-03 | Resolved: 2024-02-02

## 2024-03-11 ENCOUNTER — APPOINTMENT (OUTPATIENT)
Dept: CT IMAGING | Age: 84
End: 2024-03-11
Payer: MEDICARE

## 2024-03-11 ENCOUNTER — HOSPITAL ENCOUNTER (INPATIENT)
Age: 84
LOS: 4 days | Discharge: NURSING, REHAB OR LONG-TERM CARE | End: 2024-03-15
Attending: EMERGENCY MEDICINE | Admitting: STUDENT IN AN ORGANIZED HEALTH CARE EDUCATION/TRAINING PROGRAM
Payer: MEDICARE

## 2024-03-11 ENCOUNTER — APPOINTMENT (OUTPATIENT)
Dept: GENERAL RADIOLOGY | Age: 84
End: 2024-03-11
Payer: MEDICARE

## 2024-03-11 DIAGNOSIS — K40.90 LEFT INGUINAL HERNIA: ICD-10-CM

## 2024-03-11 DIAGNOSIS — N39.0 URINARY TRACT INFECTION WITHOUT HEMATURIA, SITE UNSPECIFIED: ICD-10-CM

## 2024-03-11 DIAGNOSIS — A41.9 SEPTICEMIA (HCC): Primary | ICD-10-CM

## 2024-03-11 LAB
ALBUMIN SERPL-MCNC: 3.2 G/DL (ref 3.4–5)
ALBUMIN/GLOB SERPL: 0.8 {RATIO} (ref 1.1–2.2)
ALP SERPL-CCNC: 155 U/L (ref 40–129)
ALT SERPL-CCNC: 19 U/L (ref 10–40)
ANION GAP SERPL CALCULATED.3IONS-SCNC: 8 MMOL/L (ref 3–16)
AST SERPL-CCNC: 26 U/L (ref 15–37)
BACTERIA URNS QL MICRO: ABNORMAL /HPF
BASOPHILS # BLD: 0 K/UL (ref 0–0.2)
BASOPHILS NFR BLD: 0.4 %
BILIRUB SERPL-MCNC: 0.3 MG/DL (ref 0–1)
BILIRUB UR QL STRIP.AUTO: NEGATIVE
BUN SERPL-MCNC: 14 MG/DL (ref 7–20)
CALCIUM SERPL-MCNC: 8.9 MG/DL (ref 8.3–10.6)
CHLORIDE SERPL-SCNC: 103 MMOL/L (ref 99–110)
CLARITY UR: ABNORMAL
CO2 SERPL-SCNC: 29 MMOL/L (ref 21–32)
COLOR UR: YELLOW
CREAT SERPL-MCNC: 1 MG/DL (ref 0.8–1.3)
DEPRECATED RDW RBC AUTO: 13.5 % (ref 12.4–15.4)
EOSINOPHIL # BLD: 0.2 K/UL (ref 0–0.6)
EOSINOPHIL NFR BLD: 2.3 %
EPI CELLS #/AREA URNS AUTO: 0 /HPF (ref 0–5)
GFR SERPLBLD CREATININE-BSD FMLA CKD-EPI: >60 ML/MIN/{1.73_M2}
GLUCOSE SERPL-MCNC: 90 MG/DL (ref 70–99)
GLUCOSE UR STRIP.AUTO-MCNC: NEGATIVE MG/DL
HCT VFR BLD AUTO: 39.3 % (ref 40.5–52.5)
HGB BLD-MCNC: 13.3 G/DL (ref 13.5–17.5)
HGB UR QL STRIP.AUTO: ABNORMAL
HYALINE CASTS #/AREA URNS AUTO: 1 /LPF (ref 0–8)
KETONES UR STRIP.AUTO-MCNC: NEGATIVE MG/DL
LACTATE BLDV-SCNC: 2.5 MMOL/L (ref 0.4–1.9)
LEUKOCYTE ESTERASE UR QL STRIP.AUTO: ABNORMAL
LIPASE SERPL-CCNC: 46 U/L (ref 13–60)
LYMPHOCYTES # BLD: 3.1 K/UL (ref 1–5.1)
LYMPHOCYTES NFR BLD: 32 %
MCH RBC QN AUTO: 31.2 PG (ref 26–34)
MCHC RBC AUTO-ENTMCNC: 33.7 G/DL (ref 31–36)
MCV RBC AUTO: 92.5 FL (ref 80–100)
MONOCYTES # BLD: 0.8 K/UL (ref 0–1.3)
MONOCYTES NFR BLD: 8.4 %
NEUTROPHILS # BLD: 5.5 K/UL (ref 1.7–7.7)
NEUTROPHILS NFR BLD: 56.9 %
NITRITE UR QL STRIP.AUTO: POSITIVE
PH UR STRIP.AUTO: 7.5 [PH] (ref 5–8)
PLATELET # BLD AUTO: 252 K/UL (ref 135–450)
PMV BLD AUTO: 9.9 FL (ref 5–10.5)
POTASSIUM SERPL-SCNC: 4.3 MMOL/L (ref 3.5–5.1)
PROT SERPL-MCNC: 7.3 G/DL (ref 6.4–8.2)
PROT UR STRIP.AUTO-MCNC: 100 MG/DL
RBC # BLD AUTO: 4.25 M/UL (ref 4.2–5.9)
RBC CLUMPS #/AREA URNS AUTO: 69 /HPF (ref 0–4)
SODIUM SERPL-SCNC: 140 MMOL/L (ref 136–145)
SP GR UR STRIP.AUTO: 1.04 (ref 1–1.03)
TROPONIN, HIGH SENSITIVITY: 12 NG/L (ref 0–22)
TROPONIN, HIGH SENSITIVITY: 14 NG/L (ref 0–22)
UA COMPLETE W REFLEX CULTURE PNL UR: YES
UA DIPSTICK W REFLEX MICRO PNL UR: YES
URN SPEC COLLECT METH UR: ABNORMAL
UROBILINOGEN UR STRIP-ACNC: 1 E.U./DL
WBC # BLD AUTO: 9.8 K/UL (ref 4–11)
WBC #/AREA URNS AUTO: 273 /HPF (ref 0–5)

## 2024-03-11 PROCEDURE — 87086 URINE CULTURE/COLONY COUNT: CPT

## 2024-03-11 PROCEDURE — 87040 BLOOD CULTURE FOR BACTERIA: CPT

## 2024-03-11 PROCEDURE — 6360000002 HC RX W HCPCS: Performed by: EMERGENCY MEDICINE

## 2024-03-11 PROCEDURE — 83690 ASSAY OF LIPASE: CPT

## 2024-03-11 PROCEDURE — 96372 THER/PROPH/DIAG INJ SC/IM: CPT

## 2024-03-11 PROCEDURE — 71046 X-RAY EXAM CHEST 2 VIEWS: CPT

## 2024-03-11 PROCEDURE — 81001 URINALYSIS AUTO W/SCOPE: CPT

## 2024-03-11 PROCEDURE — 84484 ASSAY OF TROPONIN QUANT: CPT

## 2024-03-11 PROCEDURE — 74177 CT ABD & PELVIS W/CONTRAST: CPT

## 2024-03-11 PROCEDURE — 96375 TX/PRO/DX INJ NEW DRUG ADDON: CPT

## 2024-03-11 PROCEDURE — 70450 CT HEAD/BRAIN W/O DYE: CPT

## 2024-03-11 PROCEDURE — 1200000000 HC SEMI PRIVATE

## 2024-03-11 PROCEDURE — 87150 DNA/RNA AMPLIFIED PROBE: CPT

## 2024-03-11 PROCEDURE — 87077 CULTURE AEROBIC IDENTIFY: CPT

## 2024-03-11 PROCEDURE — 83605 ASSAY OF LACTIC ACID: CPT

## 2024-03-11 PROCEDURE — 93005 ELECTROCARDIOGRAM TRACING: CPT | Performed by: EMERGENCY MEDICINE

## 2024-03-11 PROCEDURE — 36415 COLL VENOUS BLD VENIPUNCTURE: CPT

## 2024-03-11 PROCEDURE — 96374 THER/PROPH/DIAG INJ IV PUSH: CPT

## 2024-03-11 PROCEDURE — 80053 COMPREHEN METABOLIC PANEL: CPT

## 2024-03-11 PROCEDURE — 6360000004 HC RX CONTRAST MEDICATION: Performed by: EMERGENCY MEDICINE

## 2024-03-11 PROCEDURE — 2580000003 HC RX 258: Performed by: EMERGENCY MEDICINE

## 2024-03-11 PROCEDURE — 85025 COMPLETE CBC W/AUTO DIFF WBC: CPT

## 2024-03-11 PROCEDURE — 87186 SC STD MICRODIL/AGAR DIL: CPT

## 2024-03-11 PROCEDURE — 99285 EMERGENCY DEPT VISIT HI MDM: CPT

## 2024-03-11 RX ORDER — ACETAMINOPHEN 650 MG/1
650 SUPPOSITORY RECTAL EVERY 6 HOURS PRN
Status: DISCONTINUED | OUTPATIENT
Start: 2024-03-11 | End: 2024-03-15 | Stop reason: HOSPADM

## 2024-03-11 RX ORDER — LORAZEPAM 2 MG/ML
1 INJECTION INTRAMUSCULAR ONCE
Status: COMPLETED | OUTPATIENT
Start: 2024-03-11 | End: 2024-03-11

## 2024-03-11 RX ORDER — ATENOLOL 50 MG/1
100 TABLET ORAL DAILY
Status: DISCONTINUED | OUTPATIENT
Start: 2024-03-12 | End: 2024-03-15 | Stop reason: HOSPADM

## 2024-03-11 RX ORDER — LISINOPRIL 20 MG/1
20 TABLET ORAL DAILY
Status: DISCONTINUED | OUTPATIENT
Start: 2024-03-12 | End: 2024-03-15 | Stop reason: HOSPADM

## 2024-03-11 RX ORDER — ATORVASTATIN CALCIUM 80 MG/1
80 TABLET, FILM COATED ORAL DAILY
Status: DISCONTINUED | OUTPATIENT
Start: 2024-03-12 | End: 2024-03-15 | Stop reason: HOSPADM

## 2024-03-11 RX ORDER — ASPIRIN 81 MG/1
81 TABLET, CHEWABLE ORAL DAILY
Status: DISCONTINUED | OUTPATIENT
Start: 2024-03-12 | End: 2024-03-15 | Stop reason: HOSPADM

## 2024-03-11 RX ORDER — ONDANSETRON 2 MG/ML
4 INJECTION INTRAMUSCULAR; INTRAVENOUS EVERY 6 HOURS PRN
Status: DISCONTINUED | OUTPATIENT
Start: 2024-03-11 | End: 2024-03-15 | Stop reason: HOSPADM

## 2024-03-11 RX ORDER — ACETAMINOPHEN 325 MG/1
650 TABLET ORAL EVERY 6 HOURS PRN
Status: DISCONTINUED | OUTPATIENT
Start: 2024-03-11 | End: 2024-03-15 | Stop reason: HOSPADM

## 2024-03-11 RX ORDER — MIRTAZAPINE 15 MG/1
7.5 TABLET, FILM COATED ORAL NIGHTLY
Status: DISCONTINUED | OUTPATIENT
Start: 2024-03-12 | End: 2024-03-15 | Stop reason: HOSPADM

## 2024-03-11 RX ORDER — ENOXAPARIN SODIUM 100 MG/ML
40 INJECTION SUBCUTANEOUS DAILY
Status: DISCONTINUED | OUTPATIENT
Start: 2024-03-12 | End: 2024-03-15 | Stop reason: HOSPADM

## 2024-03-11 RX ORDER — POTASSIUM CHLORIDE 7.45 MG/ML
10 INJECTION INTRAVENOUS PRN
Status: DISCONTINUED | OUTPATIENT
Start: 2024-03-11 | End: 2024-03-15 | Stop reason: HOSPADM

## 2024-03-11 RX ORDER — DORZOLAMIDE HYDROCHLORIDE AND TIMOLOL MALEATE 20; 5 MG/ML; MG/ML
1 SOLUTION/ DROPS OPHTHALMIC 2 TIMES DAILY
Status: DISCONTINUED | OUTPATIENT
Start: 2024-03-12 | End: 2024-03-15 | Stop reason: HOSPADM

## 2024-03-11 RX ORDER — SODIUM CHLORIDE 9 MG/ML
INJECTION, SOLUTION INTRAVENOUS PRN
Status: DISCONTINUED | OUTPATIENT
Start: 2024-03-11 | End: 2024-03-15 | Stop reason: HOSPADM

## 2024-03-11 RX ORDER — MAGNESIUM SULFATE IN WATER 40 MG/ML
2000 INJECTION, SOLUTION INTRAVENOUS PRN
Status: DISCONTINUED | OUTPATIENT
Start: 2024-03-11 | End: 2024-03-15 | Stop reason: HOSPADM

## 2024-03-11 RX ORDER — ONDANSETRON 4 MG/1
4 TABLET, ORALLY DISINTEGRATING ORAL EVERY 8 HOURS PRN
Status: DISCONTINUED | OUTPATIENT
Start: 2024-03-11 | End: 2024-03-15 | Stop reason: HOSPADM

## 2024-03-11 RX ORDER — ALBUTEROL SULFATE 90 UG/1
2 AEROSOL, METERED RESPIRATORY (INHALATION) EVERY 4 HOURS PRN
Status: DISCONTINUED | OUTPATIENT
Start: 2024-03-11 | End: 2024-03-15 | Stop reason: HOSPADM

## 2024-03-11 RX ORDER — 0.9 % SODIUM CHLORIDE 0.9 %
1000 INTRAVENOUS SOLUTION INTRAVENOUS ONCE
Status: COMPLETED | OUTPATIENT
Start: 2024-03-11 | End: 2024-03-11

## 2024-03-11 RX ORDER — POTASSIUM CHLORIDE 20 MEQ/1
40 TABLET, EXTENDED RELEASE ORAL PRN
Status: DISCONTINUED | OUTPATIENT
Start: 2024-03-11 | End: 2024-03-15 | Stop reason: HOSPADM

## 2024-03-11 RX ORDER — SODIUM CHLORIDE 0.9 % (FLUSH) 0.9 %
5-40 SYRINGE (ML) INJECTION EVERY 12 HOURS SCHEDULED
Status: DISCONTINUED | OUTPATIENT
Start: 2024-03-11 | End: 2024-03-15 | Stop reason: HOSPADM

## 2024-03-11 RX ORDER — SODIUM CHLORIDE 0.9 % (FLUSH) 0.9 %
5-40 SYRINGE (ML) INJECTION PRN
Status: DISCONTINUED | OUTPATIENT
Start: 2024-03-11 | End: 2024-03-15 | Stop reason: HOSPADM

## 2024-03-11 RX ORDER — POLYETHYLENE GLYCOL 3350 17 G/17G
17 POWDER, FOR SOLUTION ORAL DAILY PRN
Status: DISCONTINUED | OUTPATIENT
Start: 2024-03-11 | End: 2024-03-15 | Stop reason: HOSPADM

## 2024-03-11 RX ORDER — BRIMONIDINE TARTRATE 2 MG/ML
1 SOLUTION/ DROPS OPHTHALMIC 2 TIMES DAILY
Status: DISCONTINUED | OUTPATIENT
Start: 2024-03-12 | End: 2024-03-15 | Stop reason: HOSPADM

## 2024-03-11 RX ORDER — LATANOPROST 50 UG/ML
1 SOLUTION/ DROPS OPHTHALMIC NIGHTLY
Status: DISCONTINUED | OUTPATIENT
Start: 2024-03-12 | End: 2024-03-15 | Stop reason: HOSPADM

## 2024-03-11 RX ADMIN — IOPAMIDOL 75 ML: 755 INJECTION, SOLUTION INTRAVENOUS at 15:06

## 2024-03-11 RX ADMIN — CEFTRIAXONE 1000 MG: 1 INJECTION, POWDER, FOR SOLUTION INTRAMUSCULAR; INTRAVENOUS at 18:40

## 2024-03-11 RX ADMIN — SODIUM CHLORIDE 1000 ML: 9 INJECTION, SOLUTION INTRAVENOUS at 21:47

## 2024-03-11 RX ADMIN — LORAZEPAM 1 MG: 2 INJECTION INTRAMUSCULAR; INTRAVENOUS at 20:27

## 2024-03-11 NOTE — ED PROVIDER NOTES
[facility] EMERGENCY DEPARTMENT  EMERGENCY DEPARTMENT ENCOUNTER      Pt Name: Jelani Martínez  MRN: [id number]  Birthdate 1940  Date of evaluation: 3/11/2024  Provider: CHANTE TATE DO    CHIEF COMPLAINT       Chief Complaint   Patient presents with    Groin Pain    \"seizure-like activity\"     EMS reports per nursing home staff the pt appeared to have tremors while sitting but would stop when his name was called or stimulated with touch. Pt states his groin hurts starting today.         HISTORY OF PRESENT ILLNESS   (Location/Symptom, Timing/Onset, Context/Setting, Quality, Duration, Modifying Factors, Severity)  Note limiting factors.   Jelani Martínez is a 83 y.o. male who presents to the emergency department with a complaint of an episode of shaking that occurred today just prior to arrival.  The patient lives at a skilled nursing facility.  He states \"I just could not stop shaking\".  He confirms that he felt like he was having chills.  He denies any documented fever.  He recalls people talking to him while this was going on.  He also reports that he has been having some pain in the left inguinal area today since waking and also some pain in the right upper quadrant of the abdomen.  He denies any associated chest pain heaviness pressure tightness shortness of breath diaphoresis dyspnea on exertion.  He denies any back pain or flank pain.  He denies any groin or testicular pain.    He denies any recent fall trauma or injury.  He denies any cough or sputum production.  No earache sore throat or sinus drainage.  No body aches.  He denies any dysuria hematuria frequency or urgency.  He denies any melena or hematochezia.    Medical history significant for severe vascular dementia, hypertension, COPD, benign prostatic hyperplasia, prior stroke, hypertension, hyperlipidemia, cognitive deficit, radiculopathy, osteoarthritis, TIA, partial right pneumonectomy, prior tobacco

## 2024-03-11 NOTE — ED NOTES
Cole (pt son; [phone]) called to get update. No update at this time. I told him I would update him we had more information

## 2024-03-11 NOTE — ED NOTES
Straight cath performed per sterile protocol. Urine specimen obtained and sent to lab. Call light within reach.

## 2024-03-11 NOTE — ACP (ADVANCE CARE PLANNING)
completed    Follow-up plan:    [] Schedule follow-up conversation to continue planning  [] Referred individual to Provider for additional questions/concerns   [] Advised patient/agent/surrogate to review completed ACP document and update if needed with changes in condition, patient preferences or care setting    [x] This note routed to one or more involved healthcare providers

## 2024-03-12 LAB
ALBUMIN SERPL-MCNC: 3 G/DL (ref 3.4–5)
ALBUMIN/GLOB SERPL: 0.8 {RATIO} (ref 1.1–2.2)
ALP SERPL-CCNC: 151 U/L (ref 40–129)
ALT SERPL-CCNC: 18 U/L (ref 10–40)
ANION GAP SERPL CALCULATED.3IONS-SCNC: 8 MMOL/L (ref 3–16)
AST SERPL-CCNC: 27 U/L (ref 15–37)
BASOPHILS # BLD: 0.1 K/UL (ref 0–0.2)
BASOPHILS NFR BLD: 0.5 %
BILIRUB SERPL-MCNC: 0.4 MG/DL (ref 0–1)
BUN SERPL-MCNC: 13 MG/DL (ref 7–20)
CALCIUM SERPL-MCNC: 8.5 MG/DL (ref 8.3–10.6)
CHLORIDE SERPL-SCNC: 104 MMOL/L (ref 99–110)
CO2 SERPL-SCNC: 27 MMOL/L (ref 21–32)
CREAT SERPL-MCNC: 0.9 MG/DL (ref 0.8–1.3)
DEPRECATED RDW RBC AUTO: 13.4 % (ref 12.4–15.4)
EKG ATRIAL RATE: 71 BPM
EKG DIAGNOSIS: NORMAL
EKG P AXIS: 82 DEGREES
EKG P-R INTERVAL: 158 MS
EKG Q-T INTERVAL: 442 MS
EKG QRS DURATION: 128 MS
EKG QTC CALCULATION (BAZETT): 480 MS
EKG R AXIS: 3 DEGREES
EKG T AXIS: 3 DEGREES
EKG VENTRICULAR RATE: 71 BPM
EOSINOPHIL # BLD: 0.2 K/UL (ref 0–0.6)
EOSINOPHIL NFR BLD: 1.3 %
GFR SERPLBLD CREATININE-BSD FMLA CKD-EPI: >60 ML/MIN/{1.73_M2}
GLUCOSE SERPL-MCNC: 79 MG/DL (ref 70–99)
HCT VFR BLD AUTO: 36.9 % (ref 40.5–52.5)
HGB BLD-MCNC: 12.6 G/DL (ref 13.5–17.5)
LACTATE BLDV-SCNC: 1.8 MMOL/L (ref 0.4–1.9)
LACTATE BLDV-SCNC: 2.1 MMOL/L (ref 0.4–2)
LACTATE BLDV-SCNC: 2.4 MMOL/L (ref 0.4–2)
LYMPHOCYTES # BLD: 1.9 K/UL (ref 1–5.1)
LYMPHOCYTES NFR BLD: 14.9 %
MCH RBC QN AUTO: 31.7 PG (ref 26–34)
MCHC RBC AUTO-ENTMCNC: 34.2 G/DL (ref 31–36)
MCV RBC AUTO: 92.7 FL (ref 80–100)
MONOCYTES # BLD: 0.9 K/UL (ref 0–1.3)
MONOCYTES NFR BLD: 6.8 %
NEUTROPHILS # BLD: 9.7 K/UL (ref 1.7–7.7)
NEUTROPHILS NFR BLD: 76.5 %
PLATELET # BLD AUTO: 217 K/UL (ref 135–450)
PMV BLD AUTO: 10.2 FL (ref 5–10.5)
POTASSIUM SERPL-SCNC: 4.3 MMOL/L (ref 3.5–5.1)
PROT SERPL-MCNC: 6.9 G/DL (ref 6.4–8.2)
RBC # BLD AUTO: 3.98 M/UL (ref 4.2–5.9)
REPORT: NORMAL
SODIUM SERPL-SCNC: 139 MMOL/L (ref 136–145)
WBC # BLD AUTO: 12.7 K/UL (ref 4–11)

## 2024-03-12 PROCEDURE — 87040 BLOOD CULTURE FOR BACTERIA: CPT

## 2024-03-12 PROCEDURE — 6360000002 HC RX W HCPCS: Performed by: STUDENT IN AN ORGANIZED HEALTH CARE EDUCATION/TRAINING PROGRAM

## 2024-03-12 PROCEDURE — 85025 COMPLETE CBC W/AUTO DIFF WBC: CPT

## 2024-03-12 PROCEDURE — 83605 ASSAY OF LACTIC ACID: CPT

## 2024-03-12 PROCEDURE — 6370000000 HC RX 637 (ALT 250 FOR IP): Performed by: STUDENT IN AN ORGANIZED HEALTH CARE EDUCATION/TRAINING PROGRAM

## 2024-03-12 PROCEDURE — 92610 EVALUATE SWALLOWING FUNCTION: CPT

## 2024-03-12 PROCEDURE — 80053 COMPREHEN METABOLIC PANEL: CPT

## 2024-03-12 PROCEDURE — 36415 COLL VENOUS BLD VENIPUNCTURE: CPT

## 2024-03-12 PROCEDURE — 93010 ELECTROCARDIOGRAM REPORT: CPT | Performed by: INTERNAL MEDICINE

## 2024-03-12 PROCEDURE — 2580000003 HC RX 258: Performed by: STUDENT IN AN ORGANIZED HEALTH CARE EDUCATION/TRAINING PROGRAM

## 2024-03-12 PROCEDURE — 1200000000 HC SEMI PRIVATE

## 2024-03-12 RX ORDER — SODIUM CHLORIDE 9 MG/ML
INJECTION, SOLUTION INTRAVENOUS CONTINUOUS
Status: DISCONTINUED | OUTPATIENT
Start: 2024-03-12 | End: 2024-03-14 | Stop reason: ALTCHOICE

## 2024-03-12 RX ORDER — SIMETHICONE 180 MG
180 CAPSULE ORAL EVERY 8 HOURS PRN
COMMUNITY

## 2024-03-12 RX ADMIN — ASPIRIN 81 MG: 81 TABLET, CHEWABLE ORAL at 10:50

## 2024-03-12 RX ADMIN — ENOXAPARIN SODIUM 40 MG: 100 INJECTION SUBCUTANEOUS at 12:35

## 2024-03-12 RX ADMIN — ATORVASTATIN CALCIUM 80 MG: 80 TABLET, FILM COATED ORAL at 10:54

## 2024-03-12 RX ADMIN — ATENOLOL 100 MG: 50 TABLET ORAL at 10:52

## 2024-03-12 RX ADMIN — CEFTRIAXONE 1000 MG: 1 INJECTION, POWDER, FOR SOLUTION INTRAMUSCULAR; INTRAVENOUS at 14:50

## 2024-03-12 RX ADMIN — SODIUM CHLORIDE, PRESERVATIVE FREE 10 ML: 5 INJECTION INTRAVENOUS at 00:00

## 2024-03-12 RX ADMIN — SODIUM CHLORIDE: 9 INJECTION, SOLUTION INTRAVENOUS at 04:03

## 2024-03-12 RX ADMIN — MIRTAZAPINE 7.5 MG: 15 TABLET, FILM COATED ORAL at 20:52

## 2024-03-12 RX ADMIN — PROPYLENE GLYCOL 2 DROP: 0.6 LIQUID OPHTHALMIC at 22:18

## 2024-03-12 RX ADMIN — BRIMONIDINE TARTRATE 1 DROP: 2 SOLUTION OPHTHALMIC at 22:18

## 2024-03-12 RX ADMIN — DORZOLAMIDE HYDROCHLORIDE AND TIMOLOL MALEATE 1 DROP: 20; 5 SOLUTION/ DROPS OPHTHALMIC at 22:18

## 2024-03-12 RX ADMIN — LATANOPROST 1 DROP: 50 SOLUTION OPHTHALMIC at 22:18

## 2024-03-12 RX ADMIN — CEFTRIAXONE 1000 MG: 1 INJECTION, POWDER, FOR SOLUTION INTRAMUSCULAR; INTRAVENOUS at 12:33

## 2024-03-12 RX ADMIN — MIRTAZAPINE 7.5 MG: 15 TABLET, FILM COATED ORAL at 01:44

## 2024-03-12 ASSESSMENT — PAIN SCALES - GENERAL
PAINLEVEL_OUTOF10: 0

## 2024-03-12 ASSESSMENT — PAIN - FUNCTIONAL ASSESSMENT: PAIN_FUNCTIONAL_ASSESSMENT: 0-10

## 2024-03-12 ASSESSMENT — PAIN SCALES - WONG BAKER
WONGBAKER_NUMERICALRESPONSE: 0
WONGBAKER_NUMERICALRESPONSE: NO HURT

## 2024-03-12 NOTE — ED NOTES
Pt PIV unable to draw blood at this time. Pt refusing straight stick for repeat lactic acid at this time. Dr. Rakesh Cerrato, DO notified.

## 2024-03-12 NOTE — PLAN OF CARE
Problem: Skin/Tissue Integrity  Goal: Absence of new skin breakdown  Description: 1.  Monitor for areas of redness and/or skin breakdown  2.  Assess vascular access sites hourly  3.  Every 4-6 hours minimum:  Change oxygen saturation probe site  4.  Every 4-6 hours:  If on nasal continuous positive airway pressure, respiratory therapy assess nares and determine need for appliance change or resting period.  Outcome: Progressing     Problem: Skin/Tissue Integrity - Adult  Goal: Skin integrity remains intact  Outcome: Progressing  Flowsheets (Taken 3/12/2024 1718)  Skin Integrity Remains Intact: Monitor for areas of redness and/or skin breakdown     Problem: Musculoskeletal - Adult  Goal: Return mobility to safest level of function  Outcome: Progressing  Flowsheets (Taken 3/12/2024 1718)  Return Mobility to Safest Level of Function: Assess patient stability and activity tolerance for standing, transferring and ambulating with or without assistive devices     Problem: Chronic Conditions and Co-morbidities  Goal: Patient's chronic conditions and co-morbidity symptoms are monitored and maintained or improved  Outcome: Progressing  Flowsheets (Taken 3/12/2024 1718)  Care Plan - Patient's Chronic Conditions and Co-Morbidity Symptoms are Monitored and Maintained or Improved: Monitor and assess patient's chronic conditions and comorbid symptoms for stability, deterioration, or improvement     Problem: Safety - Adult  Goal: Free from fall injury  Outcome: Progressing     Problem: Discharge Planning  Goal: Discharge to home or other facility with appropriate resources  Outcome: Progressing  Flowsheets (Taken 3/12/2024 1718)  Discharge to home or other facility with appropriate resources: Identify barriers to discharge with patient and caregiver     Problem: ABCDS Injury Assessment  Goal: Absence of physical injury  Outcome: Progressing

## 2024-03-12 NOTE — ED NOTES
Pt resting comfort in bed with eyes closed. Visible chest rise and fall noted. Call light within reach.

## 2024-03-12 NOTE — PROGRESS NOTES
Patient arrived to unit from ED via hospital bed. VSS on room air. Denies pain. Able to say name but birthday year wrong. Unable to say current year or place. Delayed speech. Did complete bed change and cleaned patient up. Skin in tact. Male purewick in place. Son Cole updated on the phone and spoke with patient on the phone. Water provided and turned television on. In bed in locked and lowest position with bed alarm on. Call light within reach. No other needs at this time. Dinner order placed.

## 2024-03-12 NOTE — H&P
change.  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  This appears similar No intrahepatic ductal dilatation.  No perihepatic fluid.  Hypodense nodule seen peripherally in the right hepatic lobe., similar to prior, likely cyst or hemangioma No gallstones noted GI/Bowel: No significant small bowel distention.  Moderate stool seen in colon.  Scattered colonic diverticula are seen.  Appendix identified and normal in caliber.Rectosigmoid colon is incompletely distended, accentuating its wall thickness. Pelvis: Bladder is contracted, accentuating its wall thickness.  Prominent fat seen in inguinal canal left.  Stranding is seen in the inguinal canal on the right. Peritoneum/Retroperitoneum: Atherosclerotic change seen in abdominal aorta and iliacs.No retroperitoneal adenopathy. Bones/Soft Tissues: Spurring is seen in the spine and hips.     No acute intra-abdominal abnormality.  There is diverticulosis.  No diverticulitis.  Appendix is identified and normal in caliber     CT HEAD WO CONTRAST    Result Date: 3/11/2024  EXAMINATION: CT OF THE HEAD WITHOUT CONTRAST  3/11/2024 2:57 pm TECHNIQUE: CT of the head was performed without the administration of intravenous contrast. Automated exposure control, iterative reconstruction, and/or weight based adjustment of the mA/kV was utilized to reduce the radiation dose to as low as reasonably achievable. COMPARISON: None. HISTORY: ORDERING SYSTEM PROVIDED HISTORY: shaking TECHNOLOGIST PROVIDED HISTORY: Reason for exam:->shaking Has a \"code stroke\" or \"stroke alert\" been called?->No Decision Support Exception - unselect if not a suspected or confirmed emergency medical condition->Emergency Medical Condition (MA) Reason for Exam: tremors, shaking, ams FINDINGS: Patient is asymmetrically positioned within the scanner. BRAIN/VENTRICLES: Ventricles are midline in position and mildly enlarged in size.  Encephalomalacia is seen in the left  History:        Diagnosis Date    Acute prostatitis 11/02/2021    Benign prostatic hyperplasia without lower urinary tract symptoms     Carpal tunnel syndrome     Cerebral infarction (HCC)     Cognitive communication deficit     COVID-19 12/30/2021    Emphysema, unspecified (HCC)     Hyperlipidemia     Hypertension     Muscle weakness     Neuralgia and neuritis     Nicotine dependence     Polyosteoarthritis     Radiculopathy     TIA (transient ischemic attack)     Urge incontinence        Past Surgical History:        Procedure Laterality Date    LUNG REMOVAL, PARTIAL Right     noted on CT scan    TONSILLECTOMY         Medications Prior to Admission:   Prior to Admission medications    Medication Sig Start Date End Date Taking? Authorizing Provider   simethicone (MYLICON) 180 MG capsule Take 1 capsule by mouth every 8 hours as needed for Flatulence   Yes Rosa Cantu MD   atenolol (TENORMIN) 100 MG tablet Take 1 tablet by mouth daily 1/7/24   Jomar Barrera MD   brimonidine (ALPHAGAN) 0.2 % ophthalmic solution Place 1 drop into both eyes 2 times daily    Rosa Cantu MD   dorzolamide-timolol (COSOPT) 2-0.5 % ophthalmic solution Place 1 drop into both eyes 2 times daily    Rosa Cantu MD   guaiFENesin (ROBITUSSIN) 100 MG/5ML liquid Take 10 mLs by mouth every 4 hours as needed for Cough    Rosa Cantu MD   latanoprost (XALATAN) 0.005 % ophthalmic solution Place 1 drop into both eyes nightly    Rosa Cantu MD   mirtazapine (REMERON) 7.5 MG tablet Take 1 tablet by mouth nightly    Rosa Cantu MD   dextran 70-hypromellose (NATURAL BALANCE TEARS) 0.1-0.3 % SOLN opthalmic solution Place 2 drops into both eyes 3 times daily    Rosa Cantu MD   ondansetron (ZOFRAN) 4 MG tablet Take 1 tablet by mouth every 8 hours as needed for Nausea or Vomiting    Rosa Cantu MD   atorvastatin (LIPITOR) 80 MG tablet Take 1 tablet by mouth daily 8/18/22

## 2024-03-12 NOTE — PROGRESS NOTES
Clinical Pharmacy Note  Ceftriaxone Dose Adjustment    Jelani Martínez is receiving ceftriaxone for Ceftriaxone 1 gm IVPB Q24H.  The dose has been adjusted to Ceftriaxone 2 gm IVPB Q24H per protocol,     Wt Readings from Last 1 Encounters:   03/11/24 71.3 kg (157 lb 3 oz)         Ceftriaxone Empiric Dosing Table    Indication Weight < 100 kg** Weight ? 100 kg*   Bacteremia      Non-pneumococcal      Pneumococcal     2 g daily  2 g Q12H    2 g daily  2 g Q12H   Community Acquired PNA      Non- critically ill      Critically ill   1 g daily  2 g daily   2 g daily  2 g daily   CNS Infection 2 g Q12H 2 g Q12H   COPD exacerbation 1 g daily 2 g daily   Diabetic Foot Infection or SSTI 1 g daily 2 g daily   Endocarditis      Enterococcus faecalis (in         combination with ampicillin)        Strep sp., gram-negative         organisms     2 g Q12H      2 g daily   2 g Q12H      2 g daily   Intra-abdominal infection 1 g daily 2 g daily   Osteomyelitis  Discitis  Prosthetic Joint infection    Septic arthritis 2 g daily 2 g daily   Urinary Tract Infection     Uncomplicated     Complicated   1 g daily  2 g daily   2 g daily  2 g daily     Jasen Woodward, [facility], 3/12/2024 2:01 PM

## 2024-03-12 NOTE — PROGRESS NOTES
Patient seen and examined at bedside.  Patient admitted by my colleague earlier this morning.  Reviewed history and physical and agree with plan of care.    Briefly 83-year-old male with past medical history of hypertension, hyperlipidemia, dementia, depression presented from SNF with complaint of shaking, found having UTI, obtain cultures, started on ceftriaxone, continue to monitor.

## 2024-03-12 NOTE — PROGRESS NOTES
4 Eyes Skin Assessment     NAME:  Jelani Martínez  YOB: 1940  MEDICAL RECORD NUMBER:  [id number]    The patient is being assessed for  Admission    I agree that at least one RN has performed a thorough Head to Toe Skin Assessment on the patient. ALL assessment sites listed below have been assessed.      Areas assessed by both nurses:    Head, Face, Ears, Shoulders, Back, Chest, Arms, Elbows, Hands, Sacrum. Buttock, Coccyx, Ischium, Legs. Feet and Heels, and Under Medical Devices         Does the Patient have a Wound? No noted wound(s)       [place] Prevention initiated by RN: Yes  Wound Care Orders initiated by RN: No    Pressure Injury (Stage 3,4, Unstageable, DTI, NWPT, and Complex wounds) if present, place Wound referral order by RN under : No    New Ostomies, if present place, Ostomy referral order under : No     Nurse 1 eSignature: Electronically signed by Cristina Rasmussen RN on 3/12/24 at 4:05 PM EDT    **SHARE this note so that the co-signing nurse can place an eSignature**    Nurse 2 eSignature: Electronically signed by Lulu Conner RN on 3/12/24 at 4:05 PM EDT

## 2024-03-12 NOTE — PROGRESS NOTES
Facility/Department: [facility] EMERGENCY DEPARTMENT  Initial Assessment  DYSPHAGIA BEDSIDE SWALLOW EVALUATION     Patient: Jelani Martínez   : 1940   MRN: [id number]      Evaluation Date: 3/12/2024   Admitting Diagnosis:   UTI (urinary tract infection) [N39.0]    Pain: Did not state                                                       CHART REVIEW:  3/11/2024 admitted with c/o AMS  MD ADMISSION H&P HPI:  83 y.o. male with past medical history of dementia, hld, htn, depression presented to ED from SNF with chief complaint of \"shaking\".  On presentation patient stated that he was shaking because he was cold.  Upon further questioning of HPI patient started to say things that did not make sense. Patient was only oriented to self at this time.     IMAGING:  CXR: 3/11/2024  IMPRESSION:  COPD, but no acute cardiopulmonary disease.  Moderate osteopenia with age-indeterminate minimal thoracic compression fractures. Consider further evaluation with nuclear medicine study or MRI as appropriate.    CT ABDOMEN/PELVIS: 3/11/2024  IMPRESSION:  No acute intra-abdominal abnormality.  There is diverticulosis.  No diverticulitis.  Appendix is identified and normal in caliber    CT HEAD: 3/11/2024  IMPRESSION:  No acute intracranial abnormality.  Specifically, no subdural hematoma is identified along the falx.  Age related changes including chronic small vessel ischemic disease and cerebral atrophy.    Current Diet Level (prior to evaluation): NPO     Respiratory Status:   [x]Room Air   []O2 via nasal cannula   []Other:    Modified Barium Swallow Study: none on record    Reason for referral: SLP evaluation orders received due to dysphagia risk     History/Prior Level of Function:   Living Status: admitted from [facility]  Baseline diet: unable to reach ECF via telephone when attempted; no ECF med list available  Prior Dysphagia History: followed at [facility] 2024-2024 with rec for minced/thin and  Pharyngeal Pooling: all   [x]Decreased Laryngeal Elevation: all   []Absent Swallow:  []Wet Vocal Quality:   []Throat Clearing-Immediate:   []Throat Clearing-Delayed:   []Cough-Immediate:   []Cough-Delayed:  []Change in Vital Signs:  []Suspected Delayed Pharyngeal Clearing:  []Other:     Eating Assistance:  []Independent  [x]Setup or clean-up assistance   [] Supervision or touching assistance   [] Partial or moderate assistance   [] Substantial or maximal assistance  [] Dependent       EDUCATION:   Provided education regarding role of SLP, results of assessment, recommendations and general speech pathology plan of care.   [] Pt verbalized understanding and agreement   [x] Pt requires ongoing learning   [] No evidence of comprehension     If patient discharges prior to next visit, this note will serve as discharge.     Timed Code Minutes: 0  Total Treatment Minutes: 28    Electronically signed by:    Aletha Ramirez MA, CCC-SLP, #[id number]  Speech-Language Pathologist  Portable phone: [phone]  03/12/24 2:39 PM

## 2024-03-13 LAB
ALBUMIN SERPL-MCNC: 2.7 G/DL (ref 3.4–5)
ALBUMIN/GLOB SERPL: 0.7 {RATIO} (ref 1.1–2.2)
ALP SERPL-CCNC: 135 U/L (ref 40–129)
ALT SERPL-CCNC: 19 U/L (ref 10–40)
ANION GAP SERPL CALCULATED.3IONS-SCNC: 9 MMOL/L (ref 3–16)
AST SERPL-CCNC: 29 U/L (ref 15–37)
BACTERIA UR CULT: ABNORMAL
BASOPHILS # BLD: 0.1 K/UL (ref 0–0.2)
BASOPHILS NFR BLD: 0.6 %
BILIRUB SERPL-MCNC: 0.4 MG/DL (ref 0–1)
BUN SERPL-MCNC: 13 MG/DL (ref 7–20)
CALCIUM SERPL-MCNC: 8.4 MG/DL (ref 8.3–10.6)
CHLORIDE SERPL-SCNC: 103 MMOL/L (ref 99–110)
CO2 SERPL-SCNC: 26 MMOL/L (ref 21–32)
CREAT SERPL-MCNC: 1 MG/DL (ref 0.8–1.3)
DEPRECATED RDW RBC AUTO: 13.2 % (ref 12.4–15.4)
EOSINOPHIL # BLD: 0.2 K/UL (ref 0–0.6)
EOSINOPHIL NFR BLD: 2.1 %
GFR SERPLBLD CREATININE-BSD FMLA CKD-EPI: >60 ML/MIN/{1.73_M2}
GLUCOSE SERPL-MCNC: 90 MG/DL (ref 70–99)
HCT VFR BLD AUTO: 35.4 % (ref 40.5–52.5)
HGB BLD-MCNC: 11.9 G/DL (ref 13.5–17.5)
LYMPHOCYTES # BLD: 2.8 K/UL (ref 1–5.1)
LYMPHOCYTES NFR BLD: 24.6 %
MCH RBC QN AUTO: 31 PG (ref 26–34)
MCHC RBC AUTO-ENTMCNC: 33.8 G/DL (ref 31–36)
MCV RBC AUTO: 91.7 FL (ref 80–100)
MONOCYTES # BLD: 1.4 K/UL (ref 0–1.3)
MONOCYTES NFR BLD: 12.2 %
NEUTROPHILS # BLD: 6.8 K/UL (ref 1.7–7.7)
NEUTROPHILS NFR BLD: 60.5 %
ORGANISM: ABNORMAL
PLATELET # BLD AUTO: 209 K/UL (ref 135–450)
PMV BLD AUTO: 10 FL (ref 5–10.5)
POTASSIUM SERPL-SCNC: 4.5 MMOL/L (ref 3.5–5.1)
PROT SERPL-MCNC: 6.4 G/DL (ref 6.4–8.2)
RBC # BLD AUTO: 3.86 M/UL (ref 4.2–5.9)
SODIUM SERPL-SCNC: 138 MMOL/L (ref 136–145)
WBC # BLD AUTO: 11.2 K/UL (ref 4–11)

## 2024-03-13 PROCEDURE — 6370000000 HC RX 637 (ALT 250 FOR IP): Performed by: STUDENT IN AN ORGANIZED HEALTH CARE EDUCATION/TRAINING PROGRAM

## 2024-03-13 PROCEDURE — 97116 GAIT TRAINING THERAPY: CPT

## 2024-03-13 PROCEDURE — 97166 OT EVAL MOD COMPLEX 45 MIN: CPT

## 2024-03-13 PROCEDURE — 6360000002 HC RX W HCPCS: Performed by: STUDENT IN AN ORGANIZED HEALTH CARE EDUCATION/TRAINING PROGRAM

## 2024-03-13 PROCEDURE — 97530 THERAPEUTIC ACTIVITIES: CPT

## 2024-03-13 PROCEDURE — 1200000000 HC SEMI PRIVATE

## 2024-03-13 PROCEDURE — 97161 PT EVAL LOW COMPLEX 20 MIN: CPT

## 2024-03-13 PROCEDURE — 97535 SELF CARE MNGMENT TRAINING: CPT

## 2024-03-13 PROCEDURE — 36415 COLL VENOUS BLD VENIPUNCTURE: CPT

## 2024-03-13 PROCEDURE — 85025 COMPLETE CBC W/AUTO DIFF WBC: CPT

## 2024-03-13 PROCEDURE — 80053 COMPREHEN METABOLIC PANEL: CPT

## 2024-03-13 PROCEDURE — 92526 ORAL FUNCTION THERAPY: CPT

## 2024-03-13 RX ORDER — AMOXICILLIN AND CLAVULANATE POTASSIUM 875; 125 MG/1; MG/1
1 TABLET, FILM COATED ORAL EVERY 12 HOURS SCHEDULED
Status: DISCONTINUED | OUTPATIENT
Start: 2024-03-13 | End: 2024-03-15 | Stop reason: HOSPADM

## 2024-03-13 RX ADMIN — ATORVASTATIN CALCIUM 80 MG: 80 TABLET, FILM COATED ORAL at 10:21

## 2024-03-13 RX ADMIN — ASPIRIN 81 MG: 81 TABLET, CHEWABLE ORAL at 10:22

## 2024-03-13 RX ADMIN — DORZOLAMIDE HYDROCHLORIDE AND TIMOLOL MALEATE 1 DROP: 20; 5 SOLUTION/ DROPS OPHTHALMIC at 21:23

## 2024-03-13 RX ADMIN — LISINOPRIL 20 MG: 20 TABLET ORAL at 10:21

## 2024-03-13 RX ADMIN — PROPYLENE GLYCOL 2 DROP: 0.6 LIQUID OPHTHALMIC at 21:23

## 2024-03-13 RX ADMIN — BRIMONIDINE TARTRATE 1 DROP: 2 SOLUTION OPHTHALMIC at 21:23

## 2024-03-13 RX ADMIN — BRIMONIDINE TARTRATE 1 DROP: 2 SOLUTION OPHTHALMIC at 10:23

## 2024-03-13 RX ADMIN — AMOXICILLIN AND CLAVULANATE POTASSIUM 1 TABLET: 875; 125 TABLET, FILM COATED ORAL at 21:24

## 2024-03-13 RX ADMIN — ATENOLOL 100 MG: 50 TABLET ORAL at 10:22

## 2024-03-13 RX ADMIN — DORZOLAMIDE HYDROCHLORIDE AND TIMOLOL MALEATE 1 DROP: 20; 5 SOLUTION/ DROPS OPHTHALMIC at 10:22

## 2024-03-13 RX ADMIN — ONDANSETRON 4 MG: 4 TABLET, ORALLY DISINTEGRATING ORAL at 12:31

## 2024-03-13 RX ADMIN — PROPYLENE GLYCOL 2 DROP: 0.6 LIQUID OPHTHALMIC at 14:47

## 2024-03-13 RX ADMIN — PROPYLENE GLYCOL 2 DROP: 0.6 LIQUID OPHTHALMIC at 10:22

## 2024-03-13 RX ADMIN — MIRTAZAPINE 7.5 MG: 15 TABLET, FILM COATED ORAL at 21:24

## 2024-03-13 RX ADMIN — LATANOPROST 1 DROP: 50 SOLUTION OPHTHALMIC at 21:23

## 2024-03-13 RX ADMIN — ENOXAPARIN SODIUM 40 MG: 100 INJECTION SUBCUTANEOUS at 10:31

## 2024-03-13 ASSESSMENT — PAIN SCALES - WONG BAKER
WONGBAKER_NUMERICALRESPONSE: NO HURT
WONGBAKER_NUMERICALRESPONSE: 0
WONGBAKER_NUMERICALRESPONSE: 0
WONGBAKER_NUMERICALRESPONSE: NO HURT
WONGBAKER_NUMERICALRESPONSE: 0
WONGBAKER_NUMERICALRESPONSE: 0
WONGBAKER_NUMERICALRESPONSE: NO HURT
WONGBAKER_NUMERICALRESPONSE: 0
WONGBAKER_NUMERICALRESPONSE: NO HURT
WONGBAKER_NUMERICALRESPONSE: NO HURT

## 2024-03-13 ASSESSMENT — PAIN SCALES - GENERAL
PAINLEVEL_OUTOF10: 0

## 2024-03-13 NOTE — PLAN OF CARE
Problem: Skin/Tissue Integrity  Goal: Absence of new skin breakdown  Description: 1.  Monitor for areas of redness and/or skin breakdown  2.  Assess vascular access sites hourly  3.  Every 4-6 hours minimum:  Change oxygen saturation probe site  4.  Every 4-6 hours:  If on nasal continuous positive airway pressure, respiratory therapy assess nares and determine need for appliance change or resting period.  3/13/2024 1042 by Shira Marshall RN  Outcome: Progressing     Problem: Skin/Tissue Integrity - Adult  Goal: Skin integrity remains intact  3/13/2024 1042 by Shira Marshall RN  Outcome: Progressing  Flowsheets (Taken 3/13/2024 1035)  Skin Integrity Remains Intact: Monitor for areas of redness and/or skin breakdown     Problem: Musculoskeletal - Adult  Goal: Return mobility to safest level of function  3/13/2024 1042 by Shira Marshall RN  Outcome: Progressing  Flowsheets (Taken 3/13/2024 1035)  Return Mobility to Safest Level of Function: Assess patient stability and activity tolerance for standing, transferring and ambulating with or without assistive devices     Problem: Chronic Conditions and Co-morbidities  Goal: Patient's chronic conditions and co-morbidity symptoms are monitored and maintained or improved  3/13/2024 1042 by Shira Marshall RN  Outcome: Progressing  Flowsheets (Taken 3/13/2024 1035)  Care Plan - Patient's Chronic Conditions and Co-Morbidity Symptoms are Monitored and Maintained or Improved: Monitor and assess patient's chronic conditions and comorbid symptoms for stability, deterioration, or improvement     Problem: Safety - Adult  Goal: Free from fall injury  3/13/2024 1042 by Shira Marshall RN  Outcome: Progressing     Problem: Discharge Planning  Goal: Discharge to home or other facility with appropriate resources  3/13/2024 1042 by Shira Marshall RN  Outcome: Progressing  Flowsheets (Taken 3/13/2024 1035)  Discharge to home or other facility with appropriate resources: Identify  barriers to discharge with patient and caregiver     Problem: ABCDS Injury Assessment  Goal: Absence of physical injury  3/13/2024 1042 by Shira Marshall, RN  Outcome: Progressing     Problem: Pain  Goal: Verbalizes/displays adequate comfort level or baseline comfort level  3/13/2024 1042 by Shira Marshall, RN  Outcome: Progressing

## 2024-03-13 NOTE — PROGRESS NOTES
Charge RN attempted to place an IV using the vein finder and patient became combative and tried to hit her. This writer attempted to explain the need for the IV and the patient again became combative and said\"shut up\". Message was sent to the MD asking for a restraint order to place an ultra sound guided IV. MD wants to use restraints as a last resort and placed new order for PO antibiotics.

## 2024-03-13 NOTE — PROGRESS NOTES
[facility]   Speech Therapy  Daily Dysphagia Treatment Note    Jelani Martínez  AGE: 83 y.o.   GENDER: male  : 1940  [id number]  EPISODE DATE:  3/11/2024  Patient Active Problem List   Diagnosis    Chest pain    Unstable angina (HCC)    Hypertension    Dementia (HCC)    Musculoskeletal chest pain    Pneumonia    Late effects of cerebral ischemic stroke    Emphysema, unspecified (HCC)    Altered mental status    Benign prostatic hyperplasia without lower urinary tract symptoms    Severe vascular dementia (HCC)    UTI (urinary tract infection)     No Known Allergies    Treatment Diagnosis: Dysphagia     CHART REVIEW:  3/11/2024 admitted with c/o AMS  MD ADMISSION H&P HPI:  83 y.o. male with past medical history of dementia, hld, htn, depression presented to ED from SNF with chief complaint of \"shaking\".  On presentation patient stated that he was shaking because he was cold.  Upon further questioning of HPI patient started to say things that did not make sense. Patient was only oriented to self at this time.      IMAGING:  CXR: 3/11/2024  IMPRESSION:  COPD, but no acute cardiopulmonary disease.  Moderate osteopenia with age-indeterminate minimal thoracic compression fractures. Consider further evaluation with nuclear medicine study or MRI as appropriate.     CT ABDOMEN/PELVIS: 3/11/2024  IMPRESSION:  No acute intra-abdominal abnormality.  There is diverticulosis.  No diverticulitis.  Appendix is identified and normal in caliber     CT HEAD: 3/11/2024  IMPRESSION:  No acute intracranial abnormality.  Specifically, no subdural hematoma is identified along the falx.  Age related changes including chronic small vessel ischemic disease and cerebral atrophy.     Modified Barium Swallow Study: none on record     History/Prior Level of Function:   Living Status: admitted from [facility]  Baseline diet: unable to reach ECF via telephone when attempted; no ECF med list available  Prior Dysphagia History:

## 2024-03-13 NOTE — PROGRESS NOTES
Physical Therapy  Facility/Department: [address] MED SURG  Physical Therapy Initial Assessment  If pt. is D/C'd prior to next visit please refer back to last daily progress note for D/C status.  Name: Jelani Martínez  : 1940  MRN: [id number]  Date of Service: 3/13/2024    Discharge Recommendations:  Long Term Care with PT, Long Term Care without PT   Jelani Martínez scored a  on the AM-PAC short mobility form.   Recommend patient returns to prior setting with prior services and will leave further skilled therapy services up to the discretion of the facility.     PT Equipment Recommendations  Equipment Needed: No  Other: defer      Patient Diagnosis(es): The primary encounter diagnosis was Septicemia (HCC). Diagnoses of Urinary tract infection without hematuria, site unspecified and Left inguinal hernia were also pertinent to this visit.  Past Medical History:  has a past medical history of Acute prostatitis, Benign prostatic hyperplasia without lower urinary tract symptoms, Carpal tunnel syndrome, Cerebral infarction (HCC), Cognitive communication deficit, COVID-19, Emphysema, unspecified (HCC), Hyperlipidemia, Hypertension, Muscle weakness, Neuralgia and neuritis, Nicotine dependence, Polyosteoarthritis, Radiculopathy, TIA (transient ischemic attack), and Urge incontinence.  Past Surgical History:  has a past surgical history that includes Tonsillectomy and Lung removal, partial (Right).    Assessment   Assessment: The pt is an 82 yo male who presented to the ED with c/o of \"shaking\". The pt found to have a UTI. PMHx: dementia, HLD, HTN, depression   The pt is from LT and per therapy notes from January was non-ambulatory and was able to propel a w/c on his own. He did require assist of 1 for a pivot transfer. The pt is not able to give current PLOF due to h/o of dementia. Today, the pt demonstrated that he is most likely functioning close to his baseline and was able to stand to a walker with mod A  past year?: Yes  ADL Assistance: Needs assistance (assist for bathing, dressing, toileting but can feed himself)  Ambulation Assistance: Non-ambulatory (propels w/c indep)  Transfer Assistance: Needs assistance (x1 for pivot transfers to w/c)  Active : No  Additional Comments: above info per therapy note in January, 2024; the pt unable to give accureate info due to cog  Vision/Hearing  Vision  Vision: Within Functional Limits  Hearing  Hearing: Within functional limits    Cognition   Orientation  Overall Orientation Status: Impaired  Orientation Level: Oriented to person;Disoriented to situation;Disoriented to time;Disoriented to place  Cognition  Overall Cognitive Status: Exceptions  Arousal/Alertness: Delayed responses to stimuli  Following Commands: Follows one step commands with increased time;Follows one step commands with repetition  Memory: Decreased recall of biographical Information;Decreased recall of precautions;Decreased recall of recent events;Decreased short term memory  Safety Judgement: Decreased awareness of need for assistance;Decreased awareness of need for safety  Problem Solving: Decreased awareness of errors  Insights: Not aware of deficits  Initiation: Requires cues for some  Sequencing: Requires cues for some     Objective           Gross Assessment  AROM: Generally decreased, functional  Strength: Generally decreased, functional     Bed mobility  Rolling to Right: Moderate assistance  Supine to Sit: Maximum assistance  Sit to Supine: Unable to assess (up to the chair)    Transfers  Sit to Stand: Moderate Assistance  Stand to Sit: Minimal Assistance  Comment: max vc's for hand placement    Ambulation  Surface: Level tile  Device: Rolling Walker  Assistance: Minimal assistance;Moderate assistance (of 1-2)  Quality of Gait: very narrow FERNIE; difficulty turning and backing up to the chair  Distance: 4-5 steps bed > chair  More Ambulation?: No  Stairs/Curb  Stairs?: No     Balance  Posture:

## 2024-03-13 NOTE — PROGRESS NOTES
V2.0    [facility] [place]      Name:  Jelani Martínez /Age/Sex: 1940  (83 y.o. male)   MRN & CSN:  [id number] & [id number] Encounter Date/Time: 3/13/2024 8:57 AM EDT   Location:  [id number] PCP: [facility] (Inactive)     Attending:Anila Aponte MD       Hospital Day: 3      HPI :   Chief Complaint: AMS     Jelani Martínez is a 83 y.o. male who presents with 83 y.o. male with past medical history of dementia, hld, htn, depression presented to ED from SNF with chief complaint of \"shaking\".  On presentation patient stated that he was shaking because he was cold.  Upon further questioning of HPI patient started to say things that did not make sense. Patient was only oriented to self at this time.       Subjective:   Continues to be confused, combative at times.   Pulled IV cancula and is very resistant of reinsertion     Review of Systems:      Pertinent positives and negatives discussed in HPI    Objective:     Intake/Output Summary (Last 24 hours) at 3/13/2024 1831  Last data filed at 3/13/2024 1200  Gross per 24 hour   Intake 220 ml   Output --   Net 220 ml      Vitals:   Vitals:    24 2015 24 0645 24 0815 24 1021   BP: (!) 172/71  (!) 160/71 (!) 160/71   Pulse: 71  60    Resp: 18  18    Temp: 99.3 °F (37.4 °C)  98.3 °F (36.8 °C)    TempSrc: Oral  Oral    SpO2: 98%  95%    Weight:  72.6 kg (160 lb 0.9 oz)           Physical Exam:      Physical Exam Performed:    BP (!) 160/71   Pulse 60   Temp 98.3 °F (36.8 °C) (Oral)   Resp 18   Wt 72.6 kg (160 lb 0.9 oz)   SpO2 95%   BMI 20.54 kg/m²     General appearance: confused elderly female patient.   HEENT: Pupils equal, round, and reactive to light. Conjunctivae/corneas clear.  Neck: Supple, with full range of motion. No jugular venous distention. Trachea midline.  Respiratory:  Normal respiratory effort. Clear to auscultation, bilaterally without Rales/Wheezes/Rhonchi.  Cardiovascular: Regular rate and  sets  Sensitivity to follow       Lab Results   Component Value Date/Time    BLOODCULT2  03/12/2024 08:42 AM     No Growth to date.  Any change in status will be called.     Organism:   Lab Results   Component Value Date/Time    ORG Klebsiella pneumoniae DNA Detected 03/11/2024 06:42 PM    ORG Klebsiella pneumoniae 03/11/2024 06:42 PM         Assessment and Recommendations   Jelani Martínez is a 83 y.o. male who presents with UTI    Urinary tract infection   bacteremia  Patient presented to emergency with altered mental status, urine analysis with WBC of 273, large blood as well as nitrate and leukocyte esterases, blood culture growing Klebsiella (sensitivities pending), repeated blood culture 3/12 with no growth.  Patient received several IV dose of ceftriaxone however unfortunately is very resistant to reinsertion of IV cannula.  Will switch to p.o. Augmentin and observe for now.  If Klebsiella is not sensitive to p.o. we will have to reinsert with possible Hylo-Dual and restraint    Altered mental status  Baseline dementia.  Complicating admission        HTN  -atenolol, lisinopril,      HLD   -asa, lipitor      Depression  -remeron     Dry Eyes   - home eye drops   Diet ADULT DIET; Dysphagia - Pureed     DVT Prophylaxis [x] Lovenox, []  Heparin, [] SCDs, [] Ambulation,  [] Eliquis, [] Xarelto  [] Coumadin   Code Status Full Code   Disposition From: ECF   Expected Disposition: ECF   Estimated Date of Discharge: 2-3 days   Patient requires continued admission due to UTI    Surrogate Decision Maker/ POA  Daughter      Personally reviewed Lab Studies and Imaging        Medical Decision Making:  The following items were considered in medical decision making:  Discussion of patient care with other providers  Reviewed clinical lab tests  Reviewed radiology tests  Reviewed other diagnostic tests/interventions  Independent review of radiologic images  Microbiology cultures and other micro tests reviewed

## 2024-03-13 NOTE — PLAN OF CARE
Problem: Skin/Tissue Integrity  Goal: Absence of new skin breakdown  Description: 1.  Monitor for areas of redness and/or skin breakdown  2.  Assess vascular access sites hourly  3.  Every 4-6 hours minimum:  Change oxygen saturation probe site  4.  Every 4-6 hours:  If on nasal continuous positive airway pressure, respiratory therapy assess nares and determine need for appliance change or resting period.  3/13/2024 0040 by Gavin Sanchez RN  Outcome: Progressing  3/12/2024 1721 by Cristina Rasmussen RN  Outcome: Progressing     Problem: Skin/Tissue Integrity - Adult  Goal: Skin integrity remains intact  3/13/2024 0040 by Gavin Sanchez RN  Outcome: Progressing  Flowsheets  Taken 3/13/2024 0039  Skin Integrity Remains Intact: Monitor for areas of redness and/or skin breakdown  Taken 3/12/2024 2015  Skin Integrity Remains Intact: Monitor for areas of redness and/or skin breakdown  3/12/2024 1721 by Cristina Rasmussen RN  Outcome: Progressing  Flowsheets (Taken 3/12/2024 1718)  Skin Integrity Remains Intact: Monitor for areas of redness and/or skin breakdown     Problem: Musculoskeletal - Adult  Goal: Return mobility to safest level of function  3/13/2024 0040 by Gavin Sanchez RN  Outcome: Progressing  Flowsheets (Taken 3/12/2024 2015)  Return Mobility to Safest Level of Function:   Assess patient stability and activity tolerance for standing, transferring and ambulating with or without assistive devices   Assist with transfers and ambulation using safe patient handling equipment as needed   Ensure adequate protection for wounds/incisions during mobilization  3/12/2024 1721 by Cristina Rasmussen, RN  Outcome: Progressing  Flowsheets (Taken 3/12/2024 1718)  Return Mobility to Safest Level of Function: Assess patient stability and activity tolerance for standing, transferring and ambulating with or without assistive devices     Problem: Chronic Conditions and Co-morbidities  Goal: Patient's chronic conditions and  co-morbidity symptoms are monitored and maintained or improved  3/13/2024 0040 by Gavin Sanchez RN  Outcome: Progressing  Flowsheets (Taken 3/12/2024 2015)  Care Plan - Patient's Chronic Conditions and Co-Morbidity Symptoms are Monitored and Maintained or Improved:   Monitor and assess patient's chronic conditions and comorbid symptoms for stability, deterioration, or improvement   Collaborate with multidisciplinary team to address chronic and comorbid conditions and prevent exacerbation or deterioration   Update acute care plan with appropriate goals if chronic or comorbid symptoms are exacerbated and prevent overall improvement and discharge  3/12/2024 1721 by Cristina Rasmussen, RN  Outcome: Progressing  Flowsheets (Taken 3/12/2024 1718)  Care Plan - Patient's Chronic Conditions and Co-Morbidity Symptoms are Monitored and Maintained or Improved: Monitor and assess patient's chronic conditions and comorbid symptoms for stability, deterioration, or improvement     Problem: Safety - Adult  Goal: Free from fall injury  3/13/2024 0040 by Gavin Sanchez RN  Outcome: Progressing  3/12/2024 1721 by Cristina Rasmussen RN  Outcome: Progressing     Problem: Discharge Planning  Goal: Discharge to home or other facility with appropriate resources  3/13/2024 0040 by Gavin Sanchez RN  Outcome: Progressing  Flowsheets (Taken 3/12/2024 2015)  Discharge to home or other facility with appropriate resources:   Identify barriers to discharge with patient and caregiver   Arrange for needed discharge resources and transportation as appropriate   Identify discharge learning needs (meds, wound care, etc)  3/12/2024 1721 by Cristina Rasmussen, RN  Outcome: Progressing  Flowsheets (Taken 3/12/2024 1718)  Discharge to home or other facility with appropriate resources: Identify barriers to discharge with patient and caregiver     Problem: ABCDS Injury Assessment  Goal: Absence of physical injury  3/13/2024 0040 by Gavin Sanchez

## 2024-03-13 NOTE — PROGRESS NOTES
Pt became physically combative when  was obtaining morning labs. Had pulled PIV out earlier in the shift and any attempts made to secure another one have been unsuccessful due to the restlessness and combativeness . IV fluids remain on hold until another solution arises. Charge nurse aware.

## 2024-03-13 NOTE — PROGRESS NOTES
Occupational Therapy  Facility/Department: [address] MED SURG  Occupational Therapy Initial Assessment    Name: Jelani Martínez  : 1940  MRN: [id number]  Date of Service: 3/13/2024    Discharge Recommendations:  Long Term Care with OT      Jelani Martínez scored a  on the -[facility] ADL Inpatient form. \    If patient discharges prior to next session this note will serve as a discharge summary.  Please see below for the latest assessment towards goals.      Patient Diagnosis(es): The primary encounter diagnosis was Septicemia (HCC). Diagnoses of Urinary tract infection without hematuria, site unspecified and Left inguinal hernia were also pertinent to this visit.  Past Medical History:  has a past medical history of Acute prostatitis, Benign prostatic hyperplasia without lower urinary tract symptoms, Carpal tunnel syndrome, Cerebral infarction (HCC), Cognitive communication deficit, COVID-19, Emphysema, unspecified (HCC), Hyperlipidemia, Hypertension, Muscle weakness, Neuralgia and neuritis, Nicotine dependence, Polyosteoarthritis, Radiculopathy, TIA (transient ischemic attack), and Urge incontinence.  Past Surgical History:  has a past surgical history that includes Tonsillectomy and Lung removal, partial (Right).    Treatment Diagnosis: impaired ADL status, cognition, balance/fxl mobility, strength      Assessment   Performance deficits / Impairments: Decreased functional mobility ;Decreased ADL status;Decreased ROM;Decreased strength;Decreased cognition;Decreased safe awareness;Decreased endurance;Decreased balance  Assessment: Pt is an 83 y.o. male admitted from [facility] with UTI. PTA, pt LTC resident at [facility] in LTC. At baseline, pt has assist for majority ADLs (able to feed himself) and assist x1 for pivot transfers to w/c. Pt currently functioning slightly below baseline d/t the above deficits, today requiring max A for bed mobility, mod A for fxl transfers, min/mod A fxl mobility with  Information;Decreased recall of precautions;Decreased recall of recent events;Decreased short term memory  Safety Judgement: Decreased awareness of need for assistance;Decreased awareness of need for safety  Problem Solving: Decreased awareness of errors;Assistance required to generate solutions;Assistance required to identify errors made  Insights: Not aware of deficits  Initiation: Requires cues for some  Sequencing: Requires cues for some  Orientation  Overall Orientation Status: Impaired  Orientation Level: Oriented to person;Disoriented to situation;Disoriented to time;Disoriented to place    Static Sitting Balance Exercises: seated EOB SBA/CGA  Static Standing Balance Exercises: min/mod A at RW d/t posterior lean  Dynamic Standing Balance Exercises: min/mod A at RW during fxl mobility  Education Given To: Patient  Education Provided: Role of Therapy;Plan of Care;Transfer Training;ADL Adaptive Strategies;Orientation  Education Method: Demonstration;Verbal  Barriers to Learning: Cognition  Education Outcome: Continued education needed                             AM-PAC - ADL  AM-PAC Daily Activity - Inpatient   How much help is needed for putting on and taking off regular lower body clothing?: Total  How much help is needed for bathing (which includes washing, rinsing, drying)?: A Lot  How much help is needed for toileting (which includes using toilet, bedpan, or urinal)?: Total  How much help is needed for putting on and taking off regular upper body clothing?: A Lot  How much help is needed for taking care of personal grooming?: A Lot  How much help for eating meals?: A Little  AM-[facility] Inpatient Daily Activity Raw Score: 11  AM-PAC Inpatient ADL T-Scale Score : 29.04  ADL Inpatient CMS 0-100% Score: 70.42  ADL Inpatient CMS G-Code Modifier : CL           Goals  Short Term Goals  Time Frame for Short Term Goals: Prior to d/c:  Short Term Goal 1: Pt will complete feeding with set-up/supervision.  Short Term Goal

## 2024-03-14 LAB
ALBUMIN SERPL-MCNC: 2.4 G/DL (ref 3.4–5)
ALBUMIN/GLOB SERPL: 0.7 {RATIO} (ref 1.1–2.2)
ALP SERPL-CCNC: 123 U/L (ref 40–129)
ALT SERPL-CCNC: 20 U/L (ref 10–40)
ANION GAP SERPL CALCULATED.3IONS-SCNC: 8 MMOL/L (ref 3–16)
AST SERPL-CCNC: 29 U/L (ref 15–37)
BACTERIA BLD CULT: ABNORMAL
BACTERIA BLD CULT: ABNORMAL
BASOPHILS # BLD: 0 K/UL (ref 0–0.2)
BASOPHILS NFR BLD: 0.5 %
BILIRUB SERPL-MCNC: 0.4 MG/DL (ref 0–1)
BUN SERPL-MCNC: 16 MG/DL (ref 7–20)
CALCIUM SERPL-MCNC: 8.1 MG/DL (ref 8.3–10.6)
CHLORIDE SERPL-SCNC: 106 MMOL/L (ref 99–110)
CO2 SERPL-SCNC: 26 MMOL/L (ref 21–32)
CREAT SERPL-MCNC: 1 MG/DL (ref 0.8–1.3)
DEPRECATED RDW RBC AUTO: 13.6 % (ref 12.4–15.4)
EOSINOPHIL # BLD: 0.3 K/UL (ref 0–0.6)
EOSINOPHIL NFR BLD: 3.6 %
GFR SERPLBLD CREATININE-BSD FMLA CKD-EPI: >60 ML/MIN/{1.73_M2}
GLUCOSE SERPL-MCNC: 80 MG/DL (ref 70–99)
HCT VFR BLD AUTO: 33.5 % (ref 40.5–52.5)
HGB BLD-MCNC: 11.6 G/DL (ref 13.5–17.5)
LYMPHOCYTES # BLD: 2.9 K/UL (ref 1–5.1)
LYMPHOCYTES NFR BLD: 33.7 %
MCH RBC QN AUTO: 31.8 PG (ref 26–34)
MCHC RBC AUTO-ENTMCNC: 34.7 G/DL (ref 31–36)
MCV RBC AUTO: 91.7 FL (ref 80–100)
MONOCYTES # BLD: 1 K/UL (ref 0–1.3)
MONOCYTES NFR BLD: 11.1 %
NEUTROPHILS # BLD: 4.4 K/UL (ref 1.7–7.7)
NEUTROPHILS NFR BLD: 51.1 %
ORGANISM: ABNORMAL
ORGANISM: ABNORMAL
PLATELET # BLD AUTO: 200 K/UL (ref 135–450)
PMV BLD AUTO: 9.6 FL (ref 5–10.5)
POTASSIUM SERPL-SCNC: 3.9 MMOL/L (ref 3.5–5.1)
PROT SERPL-MCNC: 6 G/DL (ref 6.4–8.2)
RBC # BLD AUTO: 3.66 M/UL (ref 4.2–5.9)
SODIUM SERPL-SCNC: 140 MMOL/L (ref 136–145)
WBC # BLD AUTO: 8.7 K/UL (ref 4–11)

## 2024-03-14 PROCEDURE — 6370000000 HC RX 637 (ALT 250 FOR IP): Performed by: STUDENT IN AN ORGANIZED HEALTH CARE EDUCATION/TRAINING PROGRAM

## 2024-03-14 PROCEDURE — 6360000002 HC RX W HCPCS: Performed by: STUDENT IN AN ORGANIZED HEALTH CARE EDUCATION/TRAINING PROGRAM

## 2024-03-14 PROCEDURE — 36415 COLL VENOUS BLD VENIPUNCTURE: CPT

## 2024-03-14 PROCEDURE — 94760 N-INVAS EAR/PLS OXIMETRY 1: CPT

## 2024-03-14 PROCEDURE — 97530 THERAPEUTIC ACTIVITIES: CPT

## 2024-03-14 PROCEDURE — 1200000000 HC SEMI PRIVATE

## 2024-03-14 PROCEDURE — 80053 COMPREHEN METABOLIC PANEL: CPT

## 2024-03-14 PROCEDURE — 85025 COMPLETE CBC W/AUTO DIFF WBC: CPT

## 2024-03-14 RX ADMIN — AMOXICILLIN AND CLAVULANATE POTASSIUM 1 TABLET: 875; 125 TABLET, FILM COATED ORAL at 08:33

## 2024-03-14 RX ADMIN — DORZOLAMIDE HYDROCHLORIDE AND TIMOLOL MALEATE 1 DROP: 20; 5 SOLUTION/ DROPS OPHTHALMIC at 08:33

## 2024-03-14 RX ADMIN — PROPYLENE GLYCOL 2 DROP: 0.6 LIQUID OPHTHALMIC at 21:13

## 2024-03-14 RX ADMIN — LISINOPRIL 20 MG: 20 TABLET ORAL at 08:33

## 2024-03-14 RX ADMIN — ENOXAPARIN SODIUM 40 MG: 100 INJECTION SUBCUTANEOUS at 08:33

## 2024-03-14 RX ADMIN — ATORVASTATIN CALCIUM 80 MG: 80 TABLET, FILM COATED ORAL at 08:33

## 2024-03-14 RX ADMIN — BRIMONIDINE TARTRATE 1 DROP: 2 SOLUTION OPHTHALMIC at 21:13

## 2024-03-14 RX ADMIN — LATANOPROST 1 DROP: 50 SOLUTION OPHTHALMIC at 21:13

## 2024-03-14 RX ADMIN — ASPIRIN 81 MG: 81 TABLET, CHEWABLE ORAL at 08:33

## 2024-03-14 RX ADMIN — AMOXICILLIN AND CLAVULANATE POTASSIUM 1 TABLET: 875; 125 TABLET, FILM COATED ORAL at 21:13

## 2024-03-14 RX ADMIN — PROPYLENE GLYCOL 2 DROP: 0.6 LIQUID OPHTHALMIC at 08:33

## 2024-03-14 RX ADMIN — PROPYLENE GLYCOL 2 DROP: 0.6 LIQUID OPHTHALMIC at 15:24

## 2024-03-14 RX ADMIN — MIRTAZAPINE 7.5 MG: 15 TABLET, FILM COATED ORAL at 21:13

## 2024-03-14 RX ADMIN — ATENOLOL 100 MG: 50 TABLET ORAL at 08:33

## 2024-03-14 RX ADMIN — DORZOLAMIDE HYDROCHLORIDE AND TIMOLOL MALEATE 1 DROP: 20; 5 SOLUTION/ DROPS OPHTHALMIC at 21:13

## 2024-03-14 RX ADMIN — BRIMONIDINE TARTRATE 1 DROP: 2 SOLUTION OPHTHALMIC at 08:33

## 2024-03-14 NOTE — PLAN OF CARE
Problem: Skin/Tissue Integrity  Goal: Absence of new skin breakdown  Description: 1.  Monitor for areas of redness and/or skin breakdown  2.  Assess vascular access sites hourly  3.  Every 4-6 hours minimum:  Change oxygen saturation probe site  4.  Every 4-6 hours:  If on nasal continuous positive airway pressure, respiratory therapy assess nares and determine need for appliance change or resting period.  Outcome: Progressing     Problem: Skin/Tissue Integrity - Adult  Goal: Skin integrity remains intact  Outcome: Progressing     Problem: Musculoskeletal - Adult  Goal: Return mobility to safest level of function  Outcome: Progressing     Problem: Chronic Conditions and Co-morbidities  Goal: Patient's chronic conditions and co-morbidity symptoms are monitored and maintained or improved  Outcome: Progressing     Problem: Safety - Adult  Goal: Free from fall injury  Outcome: Progressing     Problem: Discharge Planning  Goal: Discharge to home or other facility with appropriate resources  Outcome: Progressing     Problem: ABCDS Injury Assessment  Goal: Absence of physical injury  Outcome: Progressing     Problem: Pain  Goal: Verbalizes/displays adequate comfort level or baseline comfort level  Outcome: Progressing

## 2024-03-14 NOTE — PROGRESS NOTES
Physical Therapy  Facility/Department: [address] MED SURG  Physical Therapy Daily Treatment Session    Name: Jelani Martínez  : 1940  MRN: [id number]  Date of Service: 3/14/2024    Discharge Recommendations:  Long Term Care with PT, Long Term Care without PT   PT Equipment Recommendations  Equipment Needed: No (Defer to next level of care)      Patient Diagnosis(es): The primary encounter diagnosis was Septicemia (HCC). Diagnoses of Urinary tract infection without hematuria, site unspecified and Left inguinal hernia were also pertinent to this visit.  Past Medical History:  has a past medical history of Acute prostatitis, Benign prostatic hyperplasia without lower urinary tract symptoms, Carpal tunnel syndrome, Cerebral infarction (HCC), Cognitive communication deficit, COVID-19, Emphysema, unspecified (HCC), Hyperlipidemia, Hypertension, Muscle weakness, Neuralgia and neuritis, Nicotine dependence, Polyosteoarthritis, Radiculopathy, TIA (transient ischemic attack), and Urge incontinence.  Past Surgical History:  has a past surgical history that includes Tonsillectomy and Lung removal, partial (Right).    Assessment   Body Structures, Functions, Activity Limitations Requiring Skilled Therapeutic Intervention: Decreased functional mobility ;Decreased strength;Decreased cognition;Decreased endurance;Decreased safe awareness;Decreased balance  Assessment: The pt is an 84 yo male who presented to the ED with c/o of \"shaking\". The pt found to have a UTI. PMHx: dementia, HLD, HTN, depression. The pt is from [facility] and per therapy notes from January was non-ambulatory and was able to propel a w/c on his own. He did require assist of 1 for a pivot transfer. The pt is not able to give current PLOF due to h/o of dementia. This date, the pt required max A for transfers to/from a w/c and was unable to propel the w/c or even follow instructions to perform w/c mobility. Pt will benefit from skilled PT to facilitate return to  verbalize    Therapy Time   Individual Concurrent Group Co-treatment   Time In 1303         Time Out 1345         Minutes 42         Timed Code Treatment Minutes: 42 Minutes       Danica Ly, PT  Danica Ly PT, DPT [id number]

## 2024-03-14 NOTE — PROGRESS NOTES
11.6*    209 200       BMP:    Recent Labs     03/12/24  0842 03/13/24  0432 03/14/24  0504    138 140   K 4.3 4.5 3.9    103 106   CO2 27 26 26   BUN 13 13 16   CREATININE 0.9 1.0 1.0   GLUCOSE 79 90 80       Hepatic:   Recent Labs     03/12/24  0842 03/13/24  0432 03/14/24  0504   AST 27 29 29   ALT 18 19 20   BILITOT 0.4 0.4 0.4   ALKPHOS 151* 135* 123       Lipids:   Lab Results   Component Value Date/Time    CHOL 118 08/02/2022 07:13 AM    HDL 50 08/02/2022 07:13 AM    TRIG 55 08/02/2022 07:13 AM     Hemoglobin A1C: No results found for: \"LABA1C\"  TSH: No results found for: \"TSH\"  Troponin: No results found for: \"TROPONINT\"  Lactic Acid:   Recent Labs     03/12/24  1538 03/12/24  1837   LACTA 2.4* 2.1*       BNP:   No results for input(s): \"PROBNP\" in the last 72 hours.  UA:  Lab Results   Component Value Date/Time    NITRU POSITIVE 03/11/2024 02:09 PM    COLORU Yellow 03/11/2024 02:09 PM    PHUR 7.5 03/11/2024 02:09 PM    WBCUA 273 03/11/2024 02:09 PM    RBCUA 69 03/11/2024 02:09 PM    MUCUS Present 01/03/2024 05:23 PM    BACTERIA 4+ 03/11/2024 02:09 PM    CLARITYU CLOUDY 03/11/2024 02:09 PM    SPECGRAV 1.041 03/11/2024 02:09 PM    LEUKOCYTESUR MODERATE 03/11/2024 02:09 PM    UROBILINOGEN 1.0 03/11/2024 02:09 PM    BILIRUBINUR Negative 03/11/2024 02:09 PM    BLOODU LARGE 03/11/2024 02:09 PM    GLUCOSEU Negative 03/11/2024 02:09 PM    KETUA Negative 03/11/2024 02:09 PM    AMORPHOUS Present 01/03/2024 05:23 PM     Urine Cultures:   Lab Results   Component Value Date/Time    LABURIN >100,000 CFU/ml 03/11/2024 06:19 PM     Blood Cultures:   Lab Results   Component Value Date/Time    BC See additional report for complete BCID panel. 03/11/2024 06:42 PM    BC POSITIVE for  Isolated one of two sets   03/11/2024 06:42 PM     Lab Results   Component Value Date/Time    BLOODCULT2  03/12/2024 08:42 AM     No Growth to date.  Any change in status will be called.     Organism:   Lab Results   Component  Value Date/Time    ORG Klebsiella pneumoniae DNA Detected 03/11/2024 06:42 PM    ORG Klebsiella pneumoniae 03/11/2024 06:42 PM         Assessment and Recommendations   Jelani Martínez is a 83 y.o. male who presents with UTI    Urinary tract infection   bacteremia  Patient presented to emergency with altered mental status, urine analysis with WBC of 273, large blood as well as nitrate and leukocyte esterases, blood culture growing Klebsiella (sensitivities pending), repeated blood culture 3/12 with no growth.  Patient received several IV dose of ceftriaxone however unfortunately is very resistant to reinsertion of IV cannula.  Will switch to p.o. Augmentin and observe for now.  Klebsiella sensitive to Augmentin.  Will continue to observe for 1 more day given switching to oral and likely discharge tomorrow if stable    Altered mental status  Baseline dementia.  Complicating admission        HTN  -atenolol, lisinopril,      HLD   -asa, lipitor      Depression  -remeron     Dry Eyes   - home eye drops   Diet ADULT DIET; Dysphagia - Pureed     DVT Prophylaxis [x] Lovenox, []  Heparin, [] SCDs, [] Ambulation,  [] Eliquis, [] Xarelto  [] Coumadin   Code Status Full Code   Disposition From: ECF   Expected Disposition: ECF   Estimated Date of Discharge: Tomorrow if stable  Patient requires continued admission due to UTI    Surrogate Decision Maker/ POA  Daughter      Personally reviewed Lab Studies and Imaging        Medical Decision Making:  The following items were considered in medical decision making:  Discussion of patient care with other providers  Reviewed clinical lab tests  Reviewed radiology tests  Reviewed other diagnostic tests/interventions  Independent review of radiologic images  Microbiology cultures and other micro tests reviewed        Electronically signed by Anila Aponte MD on 3/14/2024 at 3:51 PM  Comment: Please note this report has been produced using speech recognition software and may contain

## 2024-03-15 VITALS
TEMPERATURE: 98.4 F | OXYGEN SATURATION: 97 % | BODY MASS INDEX: 20.8 KG/M2 | WEIGHT: 162.04 LBS | HEART RATE: 64 BPM | RESPIRATION RATE: 16 BRPM | SYSTOLIC BLOOD PRESSURE: 164 MMHG | DIASTOLIC BLOOD PRESSURE: 79 MMHG

## 2024-03-15 PROCEDURE — 97110 THERAPEUTIC EXERCISES: CPT

## 2024-03-15 PROCEDURE — 6360000002 HC RX W HCPCS: Performed by: STUDENT IN AN ORGANIZED HEALTH CARE EDUCATION/TRAINING PROGRAM

## 2024-03-15 PROCEDURE — 6370000000 HC RX 637 (ALT 250 FOR IP): Performed by: STUDENT IN AN ORGANIZED HEALTH CARE EDUCATION/TRAINING PROGRAM

## 2024-03-15 PROCEDURE — 94760 N-INVAS EAR/PLS OXIMETRY 1: CPT

## 2024-03-15 PROCEDURE — 9990000010 HC NO CHARGE VISIT

## 2024-03-15 RX ORDER — AMOXICILLIN AND CLAVULANATE POTASSIUM 875; 125 MG/1; MG/1
1 TABLET, FILM COATED ORAL EVERY 12 HOURS SCHEDULED
Qty: 10 TABLET | Refills: 0 | Status: SHIPPED | OUTPATIENT
Start: 2024-03-15 | End: 2024-03-20

## 2024-03-15 RX ADMIN — ATENOLOL 100 MG: 50 TABLET ORAL at 08:31

## 2024-03-15 RX ADMIN — BRIMONIDINE TARTRATE 1 DROP: 2 SOLUTION OPHTHALMIC at 10:02

## 2024-03-15 RX ADMIN — ENOXAPARIN SODIUM 40 MG: 100 INJECTION SUBCUTANEOUS at 09:57

## 2024-03-15 RX ADMIN — LISINOPRIL 20 MG: 20 TABLET ORAL at 08:31

## 2024-03-15 RX ADMIN — ATORVASTATIN CALCIUM 80 MG: 80 TABLET, FILM COATED ORAL at 08:31

## 2024-03-15 RX ADMIN — PROPYLENE GLYCOL 2 DROP: 0.6 LIQUID OPHTHALMIC at 15:40

## 2024-03-15 RX ADMIN — DORZOLAMIDE HYDROCHLORIDE AND TIMOLOL MALEATE 1 DROP: 20; 5 SOLUTION/ DROPS OPHTHALMIC at 10:02

## 2024-03-15 RX ADMIN — AMOXICILLIN AND CLAVULANATE POTASSIUM 1 TABLET: 875; 125 TABLET, FILM COATED ORAL at 08:31

## 2024-03-15 RX ADMIN — ASPIRIN 81 MG: 81 TABLET, CHEWABLE ORAL at 08:31

## 2024-03-15 RX ADMIN — PROPYLENE GLYCOL 2 DROP: 0.6 LIQUID OPHTHALMIC at 10:02

## 2024-03-15 NOTE — FLOWSHEET NOTE
Pt slept well all night. No c/o pain or distress. Very pleasantly confused. All fall precautions in place. Bed alarm on. Call light in reach.  Monitored often.

## 2024-03-15 NOTE — PLAN OF CARE
Problem: Skin/Tissue Integrity  Goal: Absence of new skin breakdown  Description: 1.  Monitor for areas of redness and/or skin breakdown  2.  Assess vascular access sites hourly  3.  Every 4-6 hours minimum:  Change oxygen saturation probe site  4.  Every 4-6 hours:  If on nasal continuous positive airway pressure, respiratory therapy assess nares and determine need for appliance change or resting period.  3/15/2024 1212 by Mary Gardner RN  Outcome: Progressing     Problem: Skin/Tissue Integrity - Adult  Goal: Skin integrity remains intact  3/15/2024 1212 by Mary Gardner RN  Outcome: Progressing    Problem: Musculoskeletal - Adult  Goal: Return mobility to safest level of function  3/15/2024 1212 by Mary Gardner RN  Outcome: Progressing     Problem: Chronic Conditions and Co-morbidities  Goal: Patient's chronic conditions and co-morbidity symptoms are monitored and maintained or improved  3/15/2024 1212 by Mary Gardner RN  Outcome: Progressing     Problem: Safety - Adult  Goal: Free from fall injury  3/15/2024 1212 by Mary Gardner RN  Outcome: Progressing    Problem: Discharge Planning  Goal: Discharge to home or other facility with appropriate resources  3/15/2024 1212 by Mary Gardner RN  Outcome: Progressing     Problem: ABCDS Injury Assessment  Goal: Absence of physical injury  3/15/2024 1212 by Mary Gardner RN    Problem: Pain  Goal: Verbalizes/displays adequate comfort level or baseline comfort level  3/15/2024 1212 by Mary Gardner RN  Outcome: Progressing

## 2024-03-15 NOTE — PROGRESS NOTES
Physical Therapy  Facility/Department: [address] MED SURG  Physical Therapy Initial Assessment    Name: Jelani Martínez  : 1940  MRN: [id number]  Date of Service: 3/15/2024    Discharge Recommendations:  Long Term Care with PT, Long Term Care without PT   PT Equipment Recommendations  Equipment Needed: No (Defer to next level of care)    Jelani Martínez scored a  on the AM-PAC short mobility form. Current research shows that an AM-PAC score of 17 or less is typically not associated with a discharge to the patient's home setting. Based on the patient's AM-PAC score and their current functional mobility deficits, it is recommended that the patient have 3-5 sessions per week of Physical Therapy at d/c to increase the patient's independence.  Please see assessment section for further patient specific details.    If patient discharges prior to next session this note will serve as a discharge summary.  Please see below for the latest assessment towards goals.        Patient Diagnosis(es): The primary encounter diagnosis was Septicemia (HCC). Diagnoses of Urinary tract infection without hematuria, site unspecified and Left inguinal hernia were also pertinent to this visit.  Past Medical History:  has a past medical history of Acute prostatitis, Benign prostatic hyperplasia without lower urinary tract symptoms, Carpal tunnel syndrome, Cerebral infarction (HCC), Cognitive communication deficit, COVID-19, Emphysema, unspecified (HCC), Hyperlipidemia, Hypertension, Muscle weakness, Neuralgia and neuritis, Nicotine dependence, Polyosteoarthritis, Radiculopathy, TIA (transient ischemic attack), and Urge incontinence.  Past Surgical History:  has a past surgical history that includes Tonsillectomy and Lung removal, partial (Right).    Assessment   Body Structures, Functions, Activity Limitations Requiring Skilled Therapeutic Intervention: Decreased functional mobility ;Decreased strength;Decreased

## 2024-03-15 NOTE — DISCHARGE SUMMARY
Hospital Medicine Discharge Summary    Patient ID: Jelani Martínez      Patient's PCP: [facility] (Inactive)    Admit Date: 3/11/2024     Discharge Date:   03/15/24      Admitting Provider: Yajaira Silver DO     Discharge Provider: Anila Aponte MD     Discharge Diagnoses:       Active Hospital Problems    Diagnosis     UTI (urinary tract infection) [N39.0]        The patient was seen and examined on day of discharge and this discharge summary is in conjunction with any daily progress note from day of discharge.    Hospital Course:      83 y.o. male who presents with 83 y.o. male with past medical history of dementia, hld, htn, depression presented to ED from SNF with chief complaint of \"shaking\".  On presentation patient stated that he was shaking because he was cold.  Upon further questioning of HPI patient started to say things that did not make sense. Patient was only oriented to self at this time      Urinary tract infection   bacteremia  Patient presented to emergency with altered mental status, urine analysis with WBC of 273, large blood as well as nitrate and leukocyte esterases, blood culture growing Klebsiella (sensitivities pending), repeated blood culture 3/12 with no growth.  Patient received several IV dose of ceftriaxone however unfortunately is very resistant to reinsertion of IV cannula.  Will switch to p.o. Augmentin and observe for now.  Klebsiella sensitive to Augmentin.  WBC improving, clinically stable.      Altered mental status  Baseline dementia.  Complicating admission          HTN  -atenolol, lisinopril,      HLD   -asa, lipitor      Depression  -remeron     Dry Eyes   - home eye drops   Physical Exam Performed:     BP (!) 164/79   Pulse 64   Temp 98.4 °F (36.9 °C) (Oral)   Resp 16   Wt 73.5 kg (162 lb 0.6 oz)   SpO2 97%   BMI 20.80 kg/m²       General appearance: confused elderly male  patient.   HEENT: Pupils equal, round, and reactive to light.  3 times daily      ondansetron (ZOFRAN) 4 MG tablet Take 1 tablet by mouth every 8 hours as needed for Nausea or Vomiting      atorvastatin (LIPITOR) 80 MG tablet Take 1 tablet by mouth daily      lisinopril (PRINIVIL;ZESTRIL) 20 MG tablet Take 1 tablet by mouth daily      albuterol sulfate HFA (PROVENTIL;VENTOLIN;PROAIR) 108 (90 Base) MCG/ACT inhaler Inhale 2 puffs into the lungs every 4 hours as needed for Wheezing      aspirin 81 MG chewable tablet Take 1 tablet by mouth daily      acetaminophen (TYLENOL) 500 MG tablet Take 2 tablets by mouth every 6 hours as needed for Pain             Time Spent on discharge: 35 in the examination, evaluation, counseling and review of medications and discharge plan.      Signed:    Anila Aponte MD   3/15/2024      Thank you [facility] (Inactive) for the opportunity to be involved in this patient's care. If you have any questions or concerns, please feel free to contact me at [phone].    Comment: Please note this report has been produced using speech recognition software and may contain errors related to that system including errors in grammar, punctuation, and spelling, as well as words and phrases that may be inappropriate. If there are any questions or concerns, please feel free to contact the dictating provider for clarification.

## 2024-03-15 NOTE — PROGRESS NOTES
Patient discharged to [facility]  per Dr. Anila Aponte.   Report called to nurse, Starla at [facility]. All question answered. No concerns voiced, no concerns voiced  Report and AVS/CHE given to transporter  Patient left via stretcher .  Transported to SNF via  [facility]

## 2024-03-15 NOTE — CARE COORDINATION
DISCHARGE SUMMARY     DATE OF DISCHARGE: 3/15/24    DISCHARGE DESTINATION: LTC    FACILITY  Discharging to Facility/ Agency   Name: [facility]  Address:  [address] [place] [address]  Phone:  [phone]  Fax:  [phone]     Level of Care: Long Term    Report Number: [phone]    Fax Number:  [phone]    Precert Obtained: N/A    Hens Completed: N/A    PASARR: N/A    Notified: RN, Family, and Facility/Agency (melinda Martínez notified of discharge plan [phone])    Prescriptions Faxed:no    HEMODIALYSIS: No    TRANSPORTATION: Stretcher    Company Name: [facility]     Time: 4:30 PM    Phone Number: [phone]     NEW DME ORDERED: no    COMMENTS: Melinda notified of discharge plan.Electronically signed by Kierra Gómez RN on 3/15/2024 at 12:52 PM      
  Community Resources Other (Comment)  ([facility] at [facility] includes the [facility])   CM/SW Referral Other (see comment)  (Discharge Planning)   Discharge Planning   Type of Residence Long-Term Care   Living Arrangements Other (Comment)  (Home at [facility] includes the [facility])   Current Services Prior To Admission Other (Comment)  (Home at [facility] includes the [facility])   Potential Assistance Needed Other (Comment)   DME Ordered? No  (Home at Count includes the [facility])   Potential Assistance Purchasing Medications No   Type of Home Care Services None   Patient expects to be discharged to: Long-term care   Services At/After Discharge   Transition of Care Consult (CM Consult) Discharge Planning   Services At/After Discharge Long term care   Mode of Transport at Discharge BLS   Confirm Follow Up Transport Family   Condition of Participation: Discharge Planning   The Plan for Transition of Care is related to the following treatment goals: Strengthening     Electronically signed by LITTLE Brian on 3/13/2024 at 11:51 AM

## 2024-03-15 NOTE — PLAN OF CARE
Problem: Skin/Tissue Integrity  Goal: Absence of new skin breakdown  Description: 1.  Monitor for areas of redness and/or skin breakdown  2.  Assess vascular access sites hourly  3.  Every 4-6 hours minimum:  Change oxygen saturation probe site  4.  Every 4-6 hours:  If on nasal continuous positive airway pressure, respiratory therapy assess nares and determine need for appliance change or resting period.  3/14/2024 2348 by Bia Buckner RN  Outcome: Progressing  3/14/2024 1010 by Haily Vinson RN  Outcome: Progressing   Skin assessment performed each shift per protocol.  Patient turned and repositioned every two hours and prn with pillow support. Patient checked for incontence every two hours.     Problem: Skin/Tissue Integrity - Adult  Goal: Skin integrity remains intact  3/14/2024 2348 by Bia Buckner RN  Outcome: Progressing  3/14/2024 1010 by Haily Vinson RN  Outcome: Progressing     Problem: Musculoskeletal - Adult  Goal: Return mobility to safest level of function  3/14/2024 2348 by Bia Buckner RN  Outcome: Progressing  3/14/2024 1010 by Haily Vinson RN  Outcome: Progressing     Problem: Safety - Adult  Goal: Free from fall injury  3/14/2024 2348 by Bia Buckner RN  Outcome: Progressing  3/14/2024 1010 by Haily Vinson RN  Outcome: Progressing   Pt free from falls this shift. Fall precautions in place at all times. Call light always within reach. Pt able and agreeable to contact for safety appropriately.    Problem: Discharge Planning  Goal: Discharge to home or other facility with appropriate resources  3/14/2024 2348 by Bia Buckner RN  Outcome: Progressing  3/14/2024 1010 by Haily Vinson RN  Outcome: Progressing  Continuing to work with patient and health care team on discharge plan. Discharge instructions and medication management will be reviewed prior to discharge.    Problem: ABCDS Injury Assessment  Goal: Absence of physical injury  3/14/2024 2348 by

## 2024-03-15 NOTE — DISCHARGE INSTR - COC
Continuity of Care Form    Patient Name: Jelani Martínez   :  1940  MRN:  [id number]    Admit date:  3/11/2024  Discharge date:  03/15/2024    Code Status Order: DNR-CC   Advance Directives:     Admitting Physician:  Yajaira Silver DO  PCP: [facility] (Inactive)    Discharging Nurse: Mary ALVAREZ RN   Discharging Hospital Unit/Room#: D5I-4291/4121-01  Discharging Unit Phone Number: [phone]    Emergency Contact:   Extended Emergency Contact Information  Primary Emergency Contact: Cole Martínez  Address: [address] [place], [place] [id number]  Home Phone: [phone]  Relation: Child  Secondary Emergency Contact: Leonardo Garland  Home Phone: [phone]  Mobile Phone: [phone]  Relation: Niece/Nephew   needed? No    Past Surgical History:  Past Surgical History:   Procedure Laterality Date    LUNG REMOVAL, PARTIAL Right     noted on CT scan    TONSILLECTOMY         Immunization History:   Immunization History   Administered Date(s) Administered    COVID-19, PFIZER Bivalent, DO NOT Dilute, (age 12y+), IM, 30 mcg/0.3 mL 07/10/2023    COVID-19, PFIZER PURPLE top, DILUTE for use, (age 12 y+), 30mcg/0.3mL 2021, 2021, 2021       Active Problems:  Patient Active Problem List   Diagnosis Code    Chest pain R07.9    Unstable angina (HCC) I20.0    Hypertension I10    Dementia (HCC) F03.90    Musculoskeletal chest pain R07.89    Pneumonia J18.9    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    Emphysema, unspecified (HCC) J43.9    Altered mental status R41.82    Benign prostatic hyperplasia without lower urinary tract symptoms N40.0    Severe vascular dementia (HCC) F01.C0    UTI (urinary tract infection) N39.0       Isolation/Infection:   Isolation            No Isolation          Patient Infection Status       None to display                     Nurse Assessment:  Last Vital Signs: BP (!) 164/79   Pulse 64   Temp 98.4 °F (36.9 °C) (Oral)   Resp 16   Wt  WORK SECTION    Inpatient Status Date: 3/15/24    Readmission Risk Assessment Score:  Readmission Risk              Risk of Unplanned Readmission:  18           Discharging to Facility/ Agency   Name: Discharging to Facility/ Agency   Name: [facility]  Address:  [address] [place] [address]  Phone:  [phone]  Fax:  [phone]     Dialysis Facility (if applicable)   Name:  Address:  Dialysis Schedule:  Phone:  Fax:    / signature: Electronically signed by Kierra Gómez RN on 3/15/24 at 12:49 PM EDT    PHYSICIAN SECTION    Prognosis: Fair    Condition at Discharge: Stable    Rehab Potential (if transferring to Rehab): Fair    Recommended Labs or Other Treatments After Discharge:   - long term care at facility.     Physician Certification: I certify the above information and transfer of Jelani Martínez  is necessary for the continuing treatment of the diagnosis listed and that he requires Skilled Nursing Facility for greater 30 days.     Update Admission H&P: No change in H&P    PHYSICIAN SIGNATURE:  Electronically signed by Anila Aponte MD on 3/15/24 at 12:09 PM EDT

## 2024-03-16 LAB — BACTERIA BLD CULT ORG #2: NORMAL

## 2024-03-18 NOTE — PROGRESS NOTES
Physician Progress Note      PATIENT:               AURORA FERNANDEZ  CSN #:                  [id number]  :                       1940  ADMIT DATE:       3/11/2024 12:21 PM  DISCH DATE:        3/15/2024 5:24 PM  RESPONDING  PROVIDER #:        Anila Aponte MD          QUERY TEXT:    Pt admitted with UTI. Pt noted to have leukocytosis, tachycardia and   tachypnea. If possible, please document in the progress notes and discharge   summary if you are evaluating and /or treating any of the following:      The medical record reflects the following:  Risk Factors:  Clinical Indicators: VS- Afebrile, P 94,  RR 20,  202/74 - 189/164 -   202/143.......WBC 12.7, lactate 2.5, lactic acid 2.4.....U/A- large blood, +   nitrite, moderate leuk, 4+ bacteria, 273 wbc, BC- Klebsiella   pneumoniae.....Urine cult- Klebsiella pneumoniae  Treatment: IVF, Rocephin IV, Urine culture, BC's    Thank You,  Yahaira Ordoñez RN BSN CDS CRCR  [email or web address]  Options provided:  -- Sepsis due to UTI, present on admission  -- Sepsis due to UTI, present on admission, now resolved  -- UTI without Sepsis  -- Other - I will add my own diagnosis  -- Disagree - Not applicable / Not valid  -- Disagree - Clinically unable to determine / Unknown  -- Refer to Clinical Documentation Reviewer    PROVIDER RESPONSE TEXT:    This patient has Sepsis due to UTI which was present on admission.    Query created by: Yahaira Ordoñez on 3/13/2024 2:03 PM      Electronically signed by:  Anila Aponte MD 3/18/2024 1:40 PM

## 2024-04-10 PROBLEM — N39.0 UTI (URINARY TRACT INFECTION): Status: RESOLVED | Noted: 2024-03-11 | Resolved: 2024-04-10

## 2024-05-25 ENCOUNTER — APPOINTMENT (OUTPATIENT)
Dept: GENERAL RADIOLOGY | Age: 84
DRG: 305 | End: 2024-05-25
Payer: MEDICARE

## 2024-05-25 ENCOUNTER — APPOINTMENT (OUTPATIENT)
Dept: CT IMAGING | Age: 84
DRG: 305 | End: 2024-05-25
Payer: MEDICARE

## 2024-05-25 ENCOUNTER — HOSPITAL ENCOUNTER (INPATIENT)
Age: 84
LOS: 5 days | Discharge: NURSING, REHAB OR LONG-TERM CARE | DRG: 305 | End: 2024-05-30
Attending: EMERGENCY MEDICINE | Admitting: INTERNAL MEDICINE
Payer: MEDICARE

## 2024-05-25 DIAGNOSIS — R10.9 ACUTE ABDOMINAL PAIN: ICD-10-CM

## 2024-05-25 DIAGNOSIS — G93.40 ACUTE ENCEPHALOPATHY: Primary | ICD-10-CM

## 2024-05-25 DIAGNOSIS — I10 ACCELERATED HYPERTENSION: ICD-10-CM

## 2024-05-25 PROBLEM — I16.1 HYPERTENSIVE EMERGENCY: Status: ACTIVE | Noted: 2024-05-25

## 2024-05-25 PROBLEM — I67.4 HYPERTENSIVE ENCEPHALOPATHY: Status: ACTIVE | Noted: 2024-05-25

## 2024-05-25 LAB
ALBUMIN SERPL-MCNC: 3.4 G/DL (ref 3.4–5)
ALBUMIN/GLOB SERPL: 0.7 {RATIO} (ref 1.1–2.2)
ALP SERPL-CCNC: 144 U/L (ref 40–129)
ALT SERPL-CCNC: 18 U/L (ref 10–40)
ANION GAP SERPL CALCULATED.3IONS-SCNC: 12 MMOL/L (ref 3–16)
AST SERPL-CCNC: 36 U/L (ref 15–37)
BACTERIA URNS QL MICRO: ABNORMAL /HPF
BACTERIA URNS QL MICRO: NORMAL /HPF
BASOPHILS # BLD: 0 K/UL (ref 0–0.2)
BASOPHILS NFR BLD: 0.6 %
BILIRUB SERPL-MCNC: 0.3 MG/DL (ref 0–1)
BILIRUB UR QL STRIP.AUTO: NEGATIVE
BILIRUB UR QL STRIP.AUTO: NEGATIVE
BUN SERPL-MCNC: 19 MG/DL (ref 7–20)
CALCIUM SERPL-MCNC: 9.5 MG/DL (ref 8.3–10.6)
CHLORIDE SERPL-SCNC: 101 MMOL/L (ref 99–110)
CLARITY UR: CLEAR
CLARITY UR: CLEAR
CO2 SERPL-SCNC: 26 MMOL/L (ref 21–32)
COLOR UR: YELLOW
COLOR UR: YELLOW
CREAT SERPL-MCNC: 1 MG/DL (ref 0.8–1.3)
DEPRECATED RDW RBC AUTO: 13.3 % (ref 12.4–15.4)
EOSINOPHIL # BLD: 0.1 K/UL (ref 0–0.6)
EOSINOPHIL NFR BLD: 2.1 %
EPI CELLS #/AREA URNS AUTO: 1 /HPF (ref 0–5)
EPI CELLS #/AREA URNS AUTO: 1 /HPF (ref 0–5)
FLUAV RNA RESP QL NAA+PROBE: NOT DETECTED
FLUBV RNA RESP QL NAA+PROBE: NOT DETECTED
GFR SERPLBLD CREATININE-BSD FMLA CKD-EPI: 74 ML/MIN/{1.73_M2}
GLUCOSE SERPL-MCNC: 93 MG/DL (ref 70–99)
GLUCOSE UR STRIP.AUTO-MCNC: NEGATIVE MG/DL
GLUCOSE UR STRIP.AUTO-MCNC: NEGATIVE MG/DL
HCT VFR BLD AUTO: 42.3 % (ref 40.5–52.5)
HGB BLD-MCNC: 14.1 G/DL (ref 13.5–17.5)
HGB UR QL STRIP.AUTO: NEGATIVE
HGB UR QL STRIP.AUTO: NEGATIVE
HYALINE CASTS #/AREA URNS AUTO: 0 /LPF (ref 0–8)
HYALINE CASTS #/AREA URNS AUTO: 2 /LPF (ref 0–8)
KETONES UR STRIP.AUTO-MCNC: NEGATIVE MG/DL
KETONES UR STRIP.AUTO-MCNC: NEGATIVE MG/DL
LACTATE BLDV-SCNC: 1.2 MMOL/L (ref 0.4–1.9)
LACTATE BLDV-SCNC: 1.7 MMOL/L (ref 0.4–1.9)
LEUKOCYTE ESTERASE UR QL STRIP.AUTO: NEGATIVE
LEUKOCYTE ESTERASE UR QL STRIP.AUTO: NEGATIVE
LIPASE SERPL-CCNC: 38 U/L (ref 13–60)
LYMPHOCYTES # BLD: 2.8 K/UL (ref 1–5.1)
LYMPHOCYTES NFR BLD: 39.2 %
MCH RBC QN AUTO: 31.5 PG (ref 26–34)
MCHC RBC AUTO-ENTMCNC: 33.3 G/DL (ref 31–36)
MCV RBC AUTO: 94.5 FL (ref 80–100)
MONOCYTES # BLD: 0.6 K/UL (ref 0–1.3)
MONOCYTES NFR BLD: 9.1 %
NEUTROPHILS # BLD: 3.5 K/UL (ref 1.7–7.7)
NEUTROPHILS NFR BLD: 49 %
NITRITE UR QL STRIP.AUTO: NEGATIVE
NITRITE UR QL STRIP.AUTO: NEGATIVE
PH UR STRIP.AUTO: 6.5 [PH] (ref 5–8)
PH UR STRIP.AUTO: 7.5 [PH] (ref 5–8)
PLATELET # BLD AUTO: 139 K/UL (ref 135–450)
PMV BLD AUTO: 10 FL (ref 5–10.5)
POTASSIUM SERPL-SCNC: 5.2 MMOL/L (ref 3.5–5.1)
PROT SERPL-MCNC: 8.4 G/DL (ref 6.4–8.2)
PROT UR STRIP.AUTO-MCNC: 300 MG/DL
PROT UR STRIP.AUTO-MCNC: 300 MG/DL
RBC # BLD AUTO: 4.47 M/UL (ref 4.2–5.9)
RBC CLUMPS #/AREA URNS AUTO: 4 /HPF (ref 0–4)
RBC CLUMPS #/AREA URNS AUTO: 5 /HPF (ref 0–4)
SARS-COV-2 RNA RESP QL NAA+PROBE: NOT DETECTED
SODIUM SERPL-SCNC: 139 MMOL/L (ref 136–145)
SP GR UR STRIP.AUTO: 1.02 (ref 1–1.03)
SP GR UR STRIP.AUTO: >=1.03 (ref 1–1.03)
TROPONIN, HIGH SENSITIVITY: 11 NG/L (ref 0–22)
TROPONIN, HIGH SENSITIVITY: 11 NG/L (ref 0–22)
UA COMPLETE W REFLEX CULTURE PNL UR: ABNORMAL
UA DIPSTICK W REFLEX MICRO PNL UR: YES
UA DIPSTICK W REFLEX MICRO PNL UR: YES
URN SPEC COLLECT METH UR: ABNORMAL
URN SPEC COLLECT METH UR: NORMAL
UROBILINOGEN UR STRIP-ACNC: 1 E.U./DL
UROBILINOGEN UR STRIP-ACNC: 2 E.U./DL
WBC # BLD AUTO: 7.1 K/UL (ref 4–11)
WBC #/AREA URNS AUTO: 1 /HPF (ref 0–5)
WBC #/AREA URNS AUTO: 2 /HPF (ref 0–5)

## 2024-05-25 PROCEDURE — 83605 ASSAY OF LACTIC ACID: CPT

## 2024-05-25 PROCEDURE — 71046 X-RAY EXAM CHEST 2 VIEWS: CPT

## 2024-05-25 PROCEDURE — 6370000000 HC RX 637 (ALT 250 FOR IP): Performed by: INTERNAL MEDICINE

## 2024-05-25 PROCEDURE — 81001 URINALYSIS AUTO W/SCOPE: CPT

## 2024-05-25 PROCEDURE — 70450 CT HEAD/BRAIN W/O DYE: CPT

## 2024-05-25 PROCEDURE — 74177 CT ABD & PELVIS W/CONTRAST: CPT

## 2024-05-25 PROCEDURE — 96374 THER/PROPH/DIAG INJ IV PUSH: CPT

## 2024-05-25 PROCEDURE — 87636 SARSCOV2 & INF A&B AMP PRB: CPT

## 2024-05-25 PROCEDURE — 6360000002 HC RX W HCPCS: Performed by: EMERGENCY MEDICINE

## 2024-05-25 PROCEDURE — 87040 BLOOD CULTURE FOR BACTERIA: CPT

## 2024-05-25 PROCEDURE — 36415 COLL VENOUS BLD VENIPUNCTURE: CPT

## 2024-05-25 PROCEDURE — 2580000003 HC RX 258: Performed by: INTERNAL MEDICINE

## 2024-05-25 PROCEDURE — 83690 ASSAY OF LIPASE: CPT

## 2024-05-25 PROCEDURE — 93005 ELECTROCARDIOGRAM TRACING: CPT | Performed by: EMERGENCY MEDICINE

## 2024-05-25 PROCEDURE — 6370000000 HC RX 637 (ALT 250 FOR IP): Performed by: EMERGENCY MEDICINE

## 2024-05-25 PROCEDURE — 99285 EMERGENCY DEPT VISIT HI MDM: CPT

## 2024-05-25 PROCEDURE — 6360000004 HC RX CONTRAST MEDICATION: Performed by: EMERGENCY MEDICINE

## 2024-05-25 PROCEDURE — 80053 COMPREHEN METABOLIC PANEL: CPT

## 2024-05-25 PROCEDURE — 2060000000 HC ICU INTERMEDIATE R&B

## 2024-05-25 PROCEDURE — 84484 ASSAY OF TROPONIN QUANT: CPT

## 2024-05-25 PROCEDURE — 71045 X-RAY EXAM CHEST 1 VIEW: CPT

## 2024-05-25 PROCEDURE — 2580000003 HC RX 258: Performed by: EMERGENCY MEDICINE

## 2024-05-25 PROCEDURE — 85025 COMPLETE CBC W/AUTO DIFF WBC: CPT

## 2024-05-25 RX ORDER — HYDRALAZINE HYDROCHLORIDE 20 MG/ML
10 INJECTION INTRAMUSCULAR; INTRAVENOUS EVERY 6 HOURS PRN
Status: DISCONTINUED | OUTPATIENT
Start: 2024-05-25 | End: 2024-05-30 | Stop reason: HOSPADM

## 2024-05-25 RX ORDER — ONDANSETRON 4 MG/1
4 TABLET, ORALLY DISINTEGRATING ORAL EVERY 8 HOURS PRN
Status: DISCONTINUED | OUTPATIENT
Start: 2024-05-25 | End: 2024-05-30 | Stop reason: HOSPADM

## 2024-05-25 RX ORDER — POTASSIUM CHLORIDE 20 MEQ/1
40 TABLET, EXTENDED RELEASE ORAL PRN
Status: DISCONTINUED | OUTPATIENT
Start: 2024-05-25 | End: 2024-05-30 | Stop reason: HOSPADM

## 2024-05-25 RX ORDER — LATANOPROST 50 UG/ML
1 SOLUTION/ DROPS OPHTHALMIC NIGHTLY
Status: DISCONTINUED | OUTPATIENT
Start: 2024-05-25 | End: 2024-05-30 | Stop reason: HOSPADM

## 2024-05-25 RX ORDER — DORZOLAMIDE HCL 20 MG/ML
1 SOLUTION/ DROPS OPHTHALMIC 3 TIMES DAILY
Status: DISCONTINUED | OUTPATIENT
Start: 2024-05-25 | End: 2024-05-30 | Stop reason: HOSPADM

## 2024-05-25 RX ORDER — BRIMONIDINE TARTRATE 2 MG/ML
1 SOLUTION/ DROPS OPHTHALMIC 2 TIMES DAILY
Status: DISCONTINUED | OUTPATIENT
Start: 2024-05-25 | End: 2024-05-30 | Stop reason: HOSPADM

## 2024-05-25 RX ORDER — SODIUM CHLORIDE 0.9 % (FLUSH) 0.9 %
5-40 SYRINGE (ML) INJECTION EVERY 12 HOURS SCHEDULED
Status: DISCONTINUED | OUTPATIENT
Start: 2024-05-25 | End: 2024-05-30 | Stop reason: HOSPADM

## 2024-05-25 RX ORDER — 0.9 % SODIUM CHLORIDE 0.9 %
1000 INTRAVENOUS SOLUTION INTRAVENOUS ONCE
Status: COMPLETED | OUTPATIENT
Start: 2024-05-25 | End: 2024-05-25

## 2024-05-25 RX ORDER — SODIUM CHLORIDE 9 MG/ML
INJECTION, SOLUTION INTRAVENOUS PRN
Status: DISCONTINUED | OUTPATIENT
Start: 2024-05-25 | End: 2024-05-30 | Stop reason: HOSPADM

## 2024-05-25 RX ORDER — 0.9 % SODIUM CHLORIDE 0.9 %
500 INTRAVENOUS SOLUTION INTRAVENOUS PRN
Status: DISCONTINUED | OUTPATIENT
Start: 2024-05-25 | End: 2024-05-30 | Stop reason: HOSPADM

## 2024-05-25 RX ORDER — ATENOLOL 50 MG/1
100 TABLET ORAL DAILY
Status: DISCONTINUED | OUTPATIENT
Start: 2024-05-26 | End: 2024-05-30 | Stop reason: HOSPADM

## 2024-05-25 RX ORDER — ACETAMINOPHEN 325 MG/1
650 TABLET ORAL ONCE
Status: COMPLETED | OUTPATIENT
Start: 2024-05-25 | End: 2024-05-25

## 2024-05-25 RX ORDER — ONDANSETRON 2 MG/ML
4 INJECTION INTRAMUSCULAR; INTRAVENOUS EVERY 6 HOURS PRN
Status: DISCONTINUED | OUTPATIENT
Start: 2024-05-25 | End: 2024-05-30 | Stop reason: HOSPADM

## 2024-05-25 RX ORDER — ASPIRIN 81 MG/1
81 TABLET, CHEWABLE ORAL DAILY
Status: DISCONTINUED | OUTPATIENT
Start: 2024-05-26 | End: 2024-05-30 | Stop reason: HOSPADM

## 2024-05-25 RX ORDER — ACETAMINOPHEN 500 MG
1000 TABLET ORAL EVERY 6 HOURS PRN
Status: DISCONTINUED | OUTPATIENT
Start: 2024-05-25 | End: 2024-05-25 | Stop reason: SDUPTHER

## 2024-05-25 RX ORDER — POTASSIUM CHLORIDE 20 MEQ/1
40 TABLET, EXTENDED RELEASE ORAL PRN
Status: DISCONTINUED | OUTPATIENT
Start: 2024-05-25 | End: 2024-05-25 | Stop reason: SDUPTHER

## 2024-05-25 RX ORDER — POTASSIUM CHLORIDE 7.45 MG/ML
10 INJECTION INTRAVENOUS PRN
Status: DISCONTINUED | OUTPATIENT
Start: 2024-05-25 | End: 2024-05-30 | Stop reason: HOSPADM

## 2024-05-25 RX ORDER — ACETAMINOPHEN 325 MG/1
650 TABLET ORAL EVERY 6 HOURS PRN
Status: DISCONTINUED | OUTPATIENT
Start: 2024-05-25 | End: 2024-05-30 | Stop reason: HOSPADM

## 2024-05-25 RX ORDER — DORZOLAMIDE HYDROCHLORIDE AND TIMOLOL MALEATE 20; 5 MG/ML; MG/ML
1 SOLUTION/ DROPS OPHTHALMIC 2 TIMES DAILY
Status: DISCONTINUED | OUTPATIENT
Start: 2024-05-25 | End: 2024-05-25 | Stop reason: SDUPTHER

## 2024-05-25 RX ORDER — SODIUM CHLORIDE 0.9 % (FLUSH) 0.9 %
5-40 SYRINGE (ML) INJECTION PRN
Status: DISCONTINUED | OUTPATIENT
Start: 2024-05-25 | End: 2024-05-30 | Stop reason: HOSPADM

## 2024-05-25 RX ORDER — TIMOLOL MALEATE 5 MG/ML
1 SOLUTION/ DROPS OPHTHALMIC 2 TIMES DAILY
Status: DISCONTINUED | OUTPATIENT
Start: 2024-05-25 | End: 2024-05-30 | Stop reason: HOSPADM

## 2024-05-25 RX ORDER — ALBUTEROL SULFATE 90 UG/1
2 AEROSOL, METERED RESPIRATORY (INHALATION) EVERY 4 HOURS PRN
Status: DISCONTINUED | OUTPATIENT
Start: 2024-05-25 | End: 2024-05-30 | Stop reason: HOSPADM

## 2024-05-25 RX ORDER — ACETAMINOPHEN 650 MG/1
650 SUPPOSITORY RECTAL EVERY 6 HOURS PRN
Status: DISCONTINUED | OUTPATIENT
Start: 2024-05-25 | End: 2024-05-30 | Stop reason: HOSPADM

## 2024-05-25 RX ORDER — ONDANSETRON 4 MG/1
4 TABLET, FILM COATED ORAL EVERY 8 HOURS PRN
Status: DISCONTINUED | OUTPATIENT
Start: 2024-05-25 | End: 2024-05-25 | Stop reason: SDUPTHER

## 2024-05-25 RX ORDER — HYDRALAZINE HYDROCHLORIDE 20 MG/ML
10 INJECTION INTRAMUSCULAR; INTRAVENOUS ONCE
Status: COMPLETED | OUTPATIENT
Start: 2024-05-25 | End: 2024-05-25

## 2024-05-25 RX ORDER — LISINOPRIL 20 MG/1
20 TABLET ORAL DAILY
Status: DISCONTINUED | OUTPATIENT
Start: 2024-05-26 | End: 2024-05-26

## 2024-05-25 RX ORDER — GUAIFENESIN 200 MG/10ML
200 LIQUID ORAL EVERY 4 HOURS PRN
Status: DISCONTINUED | OUTPATIENT
Start: 2024-05-25 | End: 2024-05-25

## 2024-05-25 RX ORDER — POTASSIUM CHLORIDE 7.45 MG/ML
10 INJECTION INTRAVENOUS PRN
Status: DISCONTINUED | OUTPATIENT
Start: 2024-05-25 | End: 2024-05-25 | Stop reason: SDUPTHER

## 2024-05-25 RX ORDER — AMOXICILLIN 250 MG
2 CAPSULE ORAL DAILY PRN
COMMUNITY

## 2024-05-25 RX ORDER — ENOXAPARIN SODIUM 100 MG/ML
40 INJECTION SUBCUTANEOUS DAILY
Status: DISCONTINUED | OUTPATIENT
Start: 2024-05-26 | End: 2024-05-30 | Stop reason: HOSPADM

## 2024-05-25 RX ORDER — SIMETHICONE 80 MG
80 TABLET,CHEWABLE ORAL EVERY 8 HOURS PRN
Status: DISCONTINUED | OUTPATIENT
Start: 2024-05-25 | End: 2024-05-30 | Stop reason: HOSPADM

## 2024-05-25 RX ORDER — LABETALOL HYDROCHLORIDE 5 MG/ML
10 INJECTION, SOLUTION INTRAVENOUS EVERY 6 HOURS PRN
Status: DISCONTINUED | OUTPATIENT
Start: 2024-05-25 | End: 2024-05-30 | Stop reason: HOSPADM

## 2024-05-25 RX ORDER — MIRTAZAPINE 15 MG/1
7.5 TABLET, FILM COATED ORAL NIGHTLY
Status: DISCONTINUED | OUTPATIENT
Start: 2024-05-25 | End: 2024-05-30 | Stop reason: HOSPADM

## 2024-05-25 RX ORDER — ATORVASTATIN CALCIUM 80 MG/1
80 TABLET, FILM COATED ORAL DAILY
Status: DISCONTINUED | OUTPATIENT
Start: 2024-05-26 | End: 2024-05-30 | Stop reason: HOSPADM

## 2024-05-25 RX ADMIN — DORZOLAMIDE HYDROCHLORIDE 1 DROP: 20 SOLUTION/ DROPS OPHTHALMIC at 22:35

## 2024-05-25 RX ADMIN — SODIUM CHLORIDE 1000 ML: 9 INJECTION, SOLUTION INTRAVENOUS at 14:54

## 2024-05-25 RX ADMIN — IOPAMIDOL 75 ML: 755 INJECTION, SOLUTION INTRAVENOUS at 15:38

## 2024-05-25 RX ADMIN — MIRTAZAPINE 7.5 MG: 15 TABLET, FILM COATED ORAL at 22:35

## 2024-05-25 RX ADMIN — LATANOPROST 1 DROP: 50 SOLUTION OPHTHALMIC at 22:35

## 2024-05-25 RX ADMIN — ACETAMINOPHEN 650 MG: 325 TABLET ORAL at 14:52

## 2024-05-25 RX ADMIN — SODIUM CHLORIDE, PRESERVATIVE FREE 10 ML: 5 INJECTION INTRAVENOUS at 22:36

## 2024-05-25 RX ADMIN — TIMOLOL MALEATE 1 DROP: 5 SOLUTION OPHTHALMIC at 22:35

## 2024-05-25 RX ADMIN — HYDRALAZINE HYDROCHLORIDE 10 MG: 20 INJECTION INTRAMUSCULAR; INTRAVENOUS at 15:54

## 2024-05-25 RX ADMIN — BRIMONIDINE TARTRATE 1 DROP: 2 SOLUTION OPHTHALMIC at 22:35

## 2024-05-25 NOTE — ED PROVIDER NOTES
distress.  Alert and protecting his airway on arrival.  No hypoglycemia.  EKG no STEMI, troponin normal, no chest pain, doubt ACS.  No malignant dysrhythmia.  Urinalysis not indicative of UTI, no other clear findings to suggest infection and no indication for antibiotics for the time being.  No peritoneal signs on abdominal exam, CT imaging shows no evidence of bowel obstruction, perforation, ureteral stone, intra-abdominal abscess or other acute infectious or surgical process.  CT head without evidence of hemorrhage or mass effect.  Neuro exam without focal findings, CVA/TIA is not clearly evident, regardless patient unknown when last known well and would not be an appropriate candidate for consideration of thrombolytics.  Per family, patient is typically alert and fully oriented.  Today he appeared to be confused and emotionally labile.  Patient was noted to be markedly hypertensive while in the emergency department, encephalopathy may be related to accelerated hypertension although unclear.  Progression of underlying dementia is also considered.  Given the family reports an acute change in mental status today, will plan for admission for further evaluation and care.  Patient received hydralazine for accelerated hypertension.  Case discussed with internal medicine team who will admit.    During the patient's ED course, the patient was given:  Medications   acetaminophen (TYLENOL) tablet 650 mg (650 mg Oral Given 5/25/24 1452)   sodium chloride 0.9 % bolus 1,000 mL (0 mLs IntraVENous Stopped 5/25/24 1623)   hydrALAZINE (APRESOLINE) injection 10 mg (10 mg IntraVENous Given 5/25/24 1554)   iopamidol (ISOVUE-370) 76 % injection 75 mL (75 mLs IntraVENous Given 5/25/24 1538)        Consults:  None        History obtained from: Patient and Family (son), EMS    Pertinent social determinants of health: Mental disability (congenital or acquired, including dementia if applicable)    Chronic conditions potentially affecting

## 2024-05-25 NOTE — H&P
reconstruction, and/or weight based adjustment of the mA/kV was utilized to reduce the radiation dose to as low as reasonably achievable. COMPARISON: 03/11/2024 HISTORY: ORDERING SYSTEM PROVIDED HISTORY: Altered mental status, lower abdominal pain TECHNOLOGIST PROVIDED HISTORY: Additional Contrast?->None Reason for exam:->Altered mental status, lower abdominal pain Decision Support Exception - unselect if not a suspected or confirmed emergency medical condition->Emergency Medical Condition (MA) Reason for Exam: altered mental status FINDINGS: Lower Chest: Linear opacities in the lung bases and rounded opacity associated with curvilinear opacities in the posterior left lower lobe are again demonstrated and stable since at least 2022 imaging.  No new findings identified in the interval. Organs: The liver, pancreas, spleen, kidneys, and adrenals reveal no acute findings. No inflammatory change identified in the gallbladder fossa. Small cyst in the lateral right hepatic lobe again demonstrated questionable cystic lesion in the body of the pancreas adjacent to the pancreatic duct measuring less than 5 mm again demonstrated, less conspicuous since the prior exam. This is of questionable significance based on the small size of this finding and the patient's age. GI/Bowel: Mild rectal stool burden.  No evidence for bowel obstruction. Diverticulosis.  No inflammatory change identified. Pelvis: Small bladder diverticula.  Bilateral inguinal hernias, left greater than right. Peritoneum/Retroperitoneum: No free air or free fluid.  The aorta is normal in caliber.  The visceral branches are patent. Calcified atheromatous plaque is present.  No lymphadenopathy. Bones/Soft Tissues: No acute findings identified.     1. No acute inflammatory process identified. 2. Mild rectal stool burden. 3. Additional stable chronic and benign findings, as described.     CT Head W/O Contrast    Result Date: 5/25/2024  EXAMINATION: CT OF THE HEAD

## 2024-05-25 NOTE — ED NOTES
How does patient ambulate?   []Low Fall Risk (ambulates by themselves without support)  []Stand by assist   []Contact Guard   []Front wheel walker  []Wheelchair   []Steady  []Bed bound  []History of Lower Extremity Amputation  [x]Unknown, did not assess in the emergency department   How does patient take pills?  [x]Whole with Water  []Crushed in applesauce  []Crushed in pudding  []Other  []Unknown no oral medications were given in the ED  Is patient alert?   []Alert  []Drowsy but responds to voice  []Doesn't respond to voice but responds to painful stimuli  []Unresponsive  Is patient oriented?   [x]To person  [x]To place  []To time  []To situation  []Confused  []Agitated  [x]Follows commands  If patient is disoriented or from a Skill Nursing Facility has family been notified of admission?   [x]Yes   []No  Patient belongings?   []Cell phone  []Wallet   []Dentures  [x]Clothing  Any specific patient or family belongings/needs/dynamics?   N/A  Miscellaneous comments/pending orders?  Admit, all ER orders complete.      If there are any additional questions please reach out to the Emergency Department.

## 2024-05-25 NOTE — ED NOTES
Attempted to call report, receiving RN states she will have to call, unable to take report at this time.

## 2024-05-26 LAB
ANION GAP SERPL CALCULATED.3IONS-SCNC: 10 MMOL/L (ref 3–16)
BASOPHILS # BLD: 0.1 K/UL (ref 0–0.2)
BASOPHILS NFR BLD: 0.9 %
BUN SERPL-MCNC: 17 MG/DL (ref 7–20)
CALCIUM SERPL-MCNC: 8.9 MG/DL (ref 8.3–10.6)
CHLORIDE SERPL-SCNC: 104 MMOL/L (ref 99–110)
CO2 SERPL-SCNC: 27 MMOL/L (ref 21–32)
CREAT SERPL-MCNC: 1 MG/DL (ref 0.8–1.3)
DEPRECATED RDW RBC AUTO: 13.4 % (ref 12.4–15.4)
EKG ATRIAL RATE: 59 BPM
EKG DIAGNOSIS: NORMAL
EKG P AXIS: 80 DEGREES
EKG P-R INTERVAL: 158 MS
EKG Q-T INTERVAL: 454 MS
EKG QRS DURATION: 132 MS
EKG QTC CALCULATION (BAZETT): 449 MS
EKG R AXIS: 34 DEGREES
EKG T AXIS: 6 DEGREES
EKG VENTRICULAR RATE: 59 BPM
EOSINOPHIL # BLD: 0.2 K/UL (ref 0–0.6)
EOSINOPHIL NFR BLD: 2 %
GFR SERPLBLD CREATININE-BSD FMLA CKD-EPI: 74 ML/MIN/{1.73_M2}
GLUCOSE SERPL-MCNC: 81 MG/DL (ref 70–99)
HCT VFR BLD AUTO: 38.9 % (ref 40.5–52.5)
HGB BLD-MCNC: 13.1 G/DL (ref 13.5–17.5)
LYMPHOCYTES # BLD: 3.2 K/UL (ref 1–5.1)
LYMPHOCYTES NFR BLD: 32 %
MCH RBC QN AUTO: 30.9 PG (ref 26–34)
MCHC RBC AUTO-ENTMCNC: 33.6 G/DL (ref 31–36)
MCV RBC AUTO: 91.9 FL (ref 80–100)
MONOCYTES # BLD: 0.7 K/UL (ref 0–1.3)
MONOCYTES NFR BLD: 7.4 %
NEUTROPHILS # BLD: 5.8 K/UL (ref 1.7–7.7)
NEUTROPHILS NFR BLD: 57.7 %
PLATELET # BLD AUTO: 255 K/UL (ref 135–450)
PMV BLD AUTO: 9.2 FL (ref 5–10.5)
POTASSIUM SERPL-SCNC: 4.6 MMOL/L (ref 3.5–5.1)
PROCALCITONIN SERPL IA-MCNC: 0.09 NG/ML (ref 0–0.15)
RBC # BLD AUTO: 4.23 M/UL (ref 4.2–5.9)
SODIUM SERPL-SCNC: 141 MMOL/L (ref 136–145)
WBC # BLD AUTO: 10 K/UL (ref 4–11)

## 2024-05-26 PROCEDURE — 85025 COMPLETE CBC W/AUTO DIFF WBC: CPT

## 2024-05-26 PROCEDURE — 80048 BASIC METABOLIC PNL TOTAL CA: CPT

## 2024-05-26 PROCEDURE — 93010 ELECTROCARDIOGRAM REPORT: CPT | Performed by: INTERNAL MEDICINE

## 2024-05-26 PROCEDURE — 2580000003 HC RX 258: Performed by: INTERNAL MEDICINE

## 2024-05-26 PROCEDURE — 36415 COLL VENOUS BLD VENIPUNCTURE: CPT

## 2024-05-26 PROCEDURE — 84145 PROCALCITONIN (PCT): CPT

## 2024-05-26 PROCEDURE — 2060000000 HC ICU INTERMEDIATE R&B

## 2024-05-26 PROCEDURE — 6370000000 HC RX 637 (ALT 250 FOR IP): Performed by: INTERNAL MEDICINE

## 2024-05-26 PROCEDURE — 6360000002 HC RX W HCPCS: Performed by: INTERNAL MEDICINE

## 2024-05-26 RX ORDER — LISINOPRIL 20 MG/1
40 TABLET ORAL DAILY
Status: DISCONTINUED | OUTPATIENT
Start: 2024-05-27 | End: 2024-05-30 | Stop reason: HOSPADM

## 2024-05-26 RX ADMIN — BRIMONIDINE TARTRATE 1 DROP: 2 SOLUTION OPHTHALMIC at 22:26

## 2024-05-26 RX ADMIN — MIRTAZAPINE 7.5 MG: 15 TABLET, FILM COATED ORAL at 21:09

## 2024-05-26 RX ADMIN — DORZOLAMIDE HYDROCHLORIDE 1 DROP: 20 SOLUTION/ DROPS OPHTHALMIC at 13:59

## 2024-05-26 RX ADMIN — LISINOPRIL 20 MG: 20 TABLET ORAL at 08:01

## 2024-05-26 RX ADMIN — BRIMONIDINE TARTRATE 1 DROP: 2 SOLUTION OPHTHALMIC at 08:02

## 2024-05-26 RX ADMIN — LATANOPROST 1 DROP: 50 SOLUTION OPHTHALMIC at 21:37

## 2024-05-26 RX ADMIN — ENOXAPARIN SODIUM 40 MG: 100 INJECTION SUBCUTANEOUS at 08:01

## 2024-05-26 RX ADMIN — DORZOLAMIDE HYDROCHLORIDE 1 DROP: 20 SOLUTION/ DROPS OPHTHALMIC at 22:34

## 2024-05-26 RX ADMIN — SODIUM CHLORIDE, PRESERVATIVE FREE 10 ML: 5 INJECTION INTRAVENOUS at 08:01

## 2024-05-26 RX ADMIN — ASPIRIN 81 MG: 81 TABLET, CHEWABLE ORAL at 08:01

## 2024-05-26 RX ADMIN — TIMOLOL MALEATE 1 DROP: 5 SOLUTION OPHTHALMIC at 22:46

## 2024-05-26 RX ADMIN — ATENOLOL 100 MG: 50 TABLET ORAL at 08:01

## 2024-05-26 RX ADMIN — TIMOLOL MALEATE 1 DROP: 5 SOLUTION OPHTHALMIC at 08:02

## 2024-05-26 RX ADMIN — HYDRALAZINE HYDROCHLORIDE 10 MG: 20 INJECTION INTRAMUSCULAR; INTRAVENOUS at 18:21

## 2024-05-26 RX ADMIN — ATORVASTATIN CALCIUM 80 MG: 80 TABLET, FILM COATED ORAL at 08:01

## 2024-05-26 RX ADMIN — SODIUM CHLORIDE, PRESERVATIVE FREE 10 ML: 5 INJECTION INTRAVENOUS at 21:36

## 2024-05-26 RX ADMIN — LABETALOL HYDROCHLORIDE 10 MG: 5 INJECTION, SOLUTION INTRAVENOUS at 21:08

## 2024-05-26 RX ADMIN — HYDRALAZINE HYDROCHLORIDE 10 MG: 20 INJECTION INTRAMUSCULAR; INTRAVENOUS at 04:58

## 2024-05-26 RX ADMIN — DORZOLAMIDE HYDROCHLORIDE 1 DROP: 20 SOLUTION/ DROPS OPHTHALMIC at 08:02

## 2024-05-26 RX ADMIN — ACETAMINOPHEN 650 MG: 325 TABLET ORAL at 21:33

## 2024-05-26 ASSESSMENT — PAIN DESCRIPTION - ONSET
ONSET: ON-GOING
ONSET: ON-GOING

## 2024-05-26 ASSESSMENT — PAIN DESCRIPTION - DESCRIPTORS
DESCRIPTORS: ACHING
DESCRIPTORS: PATIENT UNABLE TO DESCRIBE

## 2024-05-26 ASSESSMENT — PAIN DESCRIPTION - FREQUENCY
FREQUENCY: CONTINUOUS
FREQUENCY: CONTINUOUS

## 2024-05-26 ASSESSMENT — PAIN SCALES - GENERAL
PAINLEVEL_OUTOF10: 3
PAINLEVEL_OUTOF10: 3
PAINLEVEL_OUTOF10: 10

## 2024-05-26 ASSESSMENT — PAIN DESCRIPTION - ORIENTATION: ORIENTATION: RIGHT

## 2024-05-26 ASSESSMENT — PAIN DESCRIPTION - LOCATION
LOCATION: ABDOMEN
LOCATION: HEAD

## 2024-05-26 NOTE — RT PROTOCOL NOTE
Bronchodilator order with Frequency of every 4 hours PRN for wheezing or increased work of breathing using Per Protocol order mode.        4-6 - enter or revise RT Bronchodilator order(s) to two equivalent RT bronchodilator orders with one order with BID Frequency and one order with Frequency of every 4 hours PRN wheezing or increased work of breathing using Per Protocol order mode.        7-10 - enter or revise RT Bronchodilator order(s) to two equivalent RT bronchodilator orders with one order with TID Frequency and one order with Frequency of every 4 hours PRN wheezing or increased work of breathing using Per Protocol order mode.       11-13 - enter or revise RT Bronchodilator order(s) to one equivalent RT bronchodilator order with QID Frequency and an Albuterol order with Frequency of every 4 hours PRN wheezing or increased work of breathing using Per Protocol order mode.      Greater than 13 - enter or revise RT Bronchodilator order(s) to one equivalent RT bronchodilator order with every 4 hours Frequency and an Albuterol order with Frequency of every 2 hours PRN wheezing or increased work of breathing using Per Protocol order mode.     RT to enter RT Home Evaluation for COPD & MDI Assessment order using Per Protocol order mode.    Electronically signed by Sarah Frias RCP on 5/25/2024 at 9:49 PM

## 2024-05-26 NOTE — PLAN OF CARE
Problem: Safety - Adult  Goal: Free from fall injury  Outcome: Progressing     Problem: Skin/Tissue Integrity  Goal: Absence of new skin breakdown  Outcome: Progressing

## 2024-05-26 NOTE — PLAN OF CARE
Problem: Discharge Planning  Goal: Discharge to home or other facility with appropriate resources  Outcome: Progressing     Problem: Safety - Adult  Goal: Free from fall injury  5/26/2024 0224 by Tracy Villareal, RN  Outcome: Progressing     Problem: ABCDS Injury Assessment  Goal: Absence of physical injury  Outcome: Progressing     Problem: Skin/Tissue Integrity  Goal: Absence of new skin breakdown  Description: 1.  Monitor for areas of redness and/or skin breakdown  2.  Assess vascular access sites hourly  3.  Every 4-6 hours minimum:  Change oxygen saturation probe site  4.  Every 4-6 hours:  If on nasal continuous positive airway pressure, respiratory therapy assess nares and determine need for appliance change or resting period.  5/26/2024 1310 by Roxy Campbell, RN  Outcome: Progressing

## 2024-05-27 LAB
ANION GAP SERPL CALCULATED.3IONS-SCNC: 13 MMOL/L (ref 3–16)
BASOPHILS # BLD: 0.1 K/UL (ref 0–0.2)
BASOPHILS NFR BLD: 0.6 %
BUN SERPL-MCNC: 18 MG/DL (ref 7–20)
CALCIUM SERPL-MCNC: 9.2 MG/DL (ref 8.3–10.6)
CHLORIDE SERPL-SCNC: 102 MMOL/L (ref 99–110)
CO2 SERPL-SCNC: 22 MMOL/L (ref 21–32)
CREAT SERPL-MCNC: 0.9 MG/DL (ref 0.8–1.3)
DEPRECATED RDW RBC AUTO: 13.5 % (ref 12.4–15.4)
EOSINOPHIL # BLD: 0.2 K/UL (ref 0–0.6)
EOSINOPHIL NFR BLD: 1.5 %
GFR SERPLBLD CREATININE-BSD FMLA CKD-EPI: 83 ML/MIN/{1.73_M2}
GLUCOSE SERPL-MCNC: 92 MG/DL (ref 70–99)
HCT VFR BLD AUTO: 39.8 % (ref 40.5–52.5)
HGB BLD-MCNC: 13.4 G/DL (ref 13.5–17.5)
LYMPHOCYTES # BLD: 2.7 K/UL (ref 1–5.1)
LYMPHOCYTES NFR BLD: 26.1 %
MCH RBC QN AUTO: 30.9 PG (ref 26–34)
MCHC RBC AUTO-ENTMCNC: 33.7 G/DL (ref 31–36)
MCV RBC AUTO: 91.8 FL (ref 80–100)
MONOCYTES # BLD: 0.9 K/UL (ref 0–1.3)
MONOCYTES NFR BLD: 8.4 %
NEUTROPHILS # BLD: 6.5 K/UL (ref 1.7–7.7)
NEUTROPHILS NFR BLD: 63.4 %
PLATELET # BLD AUTO: 258 K/UL (ref 135–450)
PMV BLD AUTO: 9.5 FL (ref 5–10.5)
POTASSIUM SERPL-SCNC: 4 MMOL/L (ref 3.5–5.1)
RBC # BLD AUTO: 4.34 M/UL (ref 4.2–5.9)
SODIUM SERPL-SCNC: 137 MMOL/L (ref 136–145)
WBC # BLD AUTO: 10.3 K/UL (ref 4–11)

## 2024-05-27 PROCEDURE — 97535 SELF CARE MNGMENT TRAINING: CPT

## 2024-05-27 PROCEDURE — 36415 COLL VENOUS BLD VENIPUNCTURE: CPT

## 2024-05-27 PROCEDURE — 80048 BASIC METABOLIC PNL TOTAL CA: CPT

## 2024-05-27 PROCEDURE — 2060000000 HC ICU INTERMEDIATE R&B

## 2024-05-27 PROCEDURE — 97165 OT EVAL LOW COMPLEX 30 MIN: CPT

## 2024-05-27 PROCEDURE — 97161 PT EVAL LOW COMPLEX 20 MIN: CPT

## 2024-05-27 PROCEDURE — 6370000000 HC RX 637 (ALT 250 FOR IP): Performed by: INTERNAL MEDICINE

## 2024-05-27 PROCEDURE — 97530 THERAPEUTIC ACTIVITIES: CPT

## 2024-05-27 PROCEDURE — 6360000002 HC RX W HCPCS: Performed by: INTERNAL MEDICINE

## 2024-05-27 PROCEDURE — 85025 COMPLETE CBC W/AUTO DIFF WBC: CPT

## 2024-05-27 PROCEDURE — 2580000003 HC RX 258: Performed by: INTERNAL MEDICINE

## 2024-05-27 RX ORDER — AMLODIPINE BESYLATE 5 MG/1
5 TABLET ORAL DAILY
Status: DISCONTINUED | OUTPATIENT
Start: 2024-05-27 | End: 2024-05-30 | Stop reason: HOSPADM

## 2024-05-27 RX ADMIN — ASPIRIN 81 MG: 81 TABLET, CHEWABLE ORAL at 08:41

## 2024-05-27 RX ADMIN — ONDANSETRON 4 MG: 2 INJECTION INTRAMUSCULAR; INTRAVENOUS at 20:53

## 2024-05-27 RX ADMIN — SODIUM CHLORIDE, PRESERVATIVE FREE 10 ML: 5 INJECTION INTRAVENOUS at 20:53

## 2024-05-27 RX ADMIN — AMLODIPINE BESYLATE 5 MG: 5 TABLET ORAL at 12:54

## 2024-05-27 RX ADMIN — BRIMONIDINE TARTRATE 1 DROP: 2 SOLUTION OPHTHALMIC at 08:43

## 2024-05-27 RX ADMIN — DORZOLAMIDE HYDROCHLORIDE 1 DROP: 20 SOLUTION/ DROPS OPHTHALMIC at 08:43

## 2024-05-27 RX ADMIN — TIMOLOL MALEATE 1 DROP: 5 SOLUTION OPHTHALMIC at 20:54

## 2024-05-27 RX ADMIN — BRIMONIDINE TARTRATE 1 DROP: 2 SOLUTION OPHTHALMIC at 20:54

## 2024-05-27 RX ADMIN — MIRTAZAPINE 7.5 MG: 15 TABLET, FILM COATED ORAL at 20:53

## 2024-05-27 RX ADMIN — ATENOLOL 100 MG: 50 TABLET ORAL at 08:41

## 2024-05-27 RX ADMIN — SODIUM CHLORIDE, PRESERVATIVE FREE 10 ML: 5 INJECTION INTRAVENOUS at 00:25

## 2024-05-27 RX ADMIN — ATORVASTATIN CALCIUM 80 MG: 80 TABLET, FILM COATED ORAL at 08:41

## 2024-05-27 RX ADMIN — LATANOPROST 1 DROP: 50 SOLUTION OPHTHALMIC at 20:54

## 2024-05-27 RX ADMIN — HYDRALAZINE HYDROCHLORIDE 10 MG: 20 INJECTION INTRAMUSCULAR; INTRAVENOUS at 00:25

## 2024-05-27 RX ADMIN — DORZOLAMIDE HYDROCHLORIDE 1 DROP: 20 SOLUTION/ DROPS OPHTHALMIC at 20:54

## 2024-05-27 RX ADMIN — LISINOPRIL 40 MG: 20 TABLET ORAL at 08:41

## 2024-05-27 RX ADMIN — TIMOLOL MALEATE 1 DROP: 5 SOLUTION OPHTHALMIC at 08:49

## 2024-05-27 RX ADMIN — ENOXAPARIN SODIUM 40 MG: 100 INJECTION SUBCUTANEOUS at 08:41

## 2024-05-27 ASSESSMENT — PAIN SCALES - GENERAL: PAINLEVEL_OUTOF10: 0

## 2024-05-27 NOTE — PLAN OF CARE
Problem: Hematologic - Adult  Goal: Maintains hematologic stability  Outcome: Progressing     Pt's visitor at bedside came to [facility] desk at [facility] & called for assistance; charge RN to bedside, obtained VS, and gave IV Labetalol for SBP > 160. See MAR & all flowsheets.      05/26/24 2051   Vitals   Pulse 88   Heart Rate Source Telemetry   Respirations 20   BP (!) 168/84   MAP (Calculated) 112   BP Location Right upper arm   BP Method Automatic   Patient Position Semi fowlers   Cardiac Rhythm Sinus rhythm   Oxygen Therapy   SpO2 97 %   Pulse Oximetry Type Intermittent   Pulse Oximeter Device Location Finger   O2 Device None (Room air)

## 2024-05-27 NOTE — FLOWSHEET NOTE
05/27/24 0653   Vitals   Cardiac Rhythm   (PCA attempting to place telemetry back on pt now that pt is cooperative)

## 2024-05-27 NOTE — PLAN OF CARE
Problem: Pain  Goal: Verbalizes/displays adequate comfort level or baseline comfort level  Outcome: Progressing      05/26/24 7651   Pain Assessment   Pain Assessment 0-10   Pain Level 3   Pain Location Head   Pain Orientation Right   Pain Descriptors Aching   Pain Frequency Continuous   Pain Onset On-going   Non-Pharmaceutical Pain Intervention(s) Emotional support  (and giving tylenol now)

## 2024-05-27 NOTE — PLAN OF CARE
Problem: Safety - Adult  Goal: Free from fall injury  Outcome: Progressing     Problem: ABCDS Injury Assessment  Goal: Absence of physical injury  Outcome: Progressing     Problem: Pain  Goal: Verbalizes/displays adequate comfort level or baseline comfort level  Outcome: Progressing     Problem: Hematologic - Adult  Goal: Maintains hematologic stability  Outcome: Progressing    PCA at bedside for tomasz-care, but pt resistant to care; assisted PCA. Pt with rigid movements and startles while repositioning/turning in bed with assistance and requires frequent reassurance/emotional support.     Change of shift now;receiving report from off-going shift.     Pt is a high fall risk. Pt remains free from falls, throughout night. Bed alarm remains in place, door open. Pt encouraged to use call light for needs throughout night; call light is within reach. Bed lock is in lowest position. VS done. BP improved s/p PRN IV dose hydralazine given at 1821 per day RN; see MAR & all flowsheets.      05/26/24 1940   Vitals   Temp 97.7 °F (36.5 °C)   Temp Source Oral   Pulse 87   Heart Rate Source Brachial;Radial   Respirations 14   BP (!) 157/73   MAP (Calculated) 101   BP Location Right upper arm   BP Upper/Lower Upper   BP Method Automatic   Patient Position Semi fowlers   Cardiac Rhythm Sinus rhythm   Pain Assessment   Pain Assessment 0-10   Pain Level 10   Pain Location Abdomen   Pain Descriptors Patient unable to describe   Pain Frequency Continuous   Pain Onset On-going   Non-Pharmaceutical Pain Intervention(s) Emotional support;Repositioned   Opioid-Induced Sedation   POSS Score 1   Oxygen Therapy   SpO2 97 %   Pulse Oximetry Type Intermittent   Pulse Oximeter Device Mode Intermittent   Pulse Oximeter Device Location Finger   O2 Device None (Room air)

## 2024-05-27 NOTE — PLAN OF CARE
Problem: Hematologic - Adult  Goal: Maintains hematologic stability  Outcome: Progressing      05/27/24 0315   Vitals   Temp 97.8 °F (36.6 °C)   Temp Source Axillary   Pulse 85   Heart Rate Source Telemetry   Respirations 20   BP (!) 157/72   MAP (Calculated) 100   BP Location Right upper arm   BP Upper/Lower Upper   BP Method Automatic   Patient Position Semi fowlers   Cardiac Rhythm   (pt refused telemetry;see nursing communication placed per hospitalist)   Oxygen Therapy   SpO2 97 %   Pulse Oximetry Type Intermittent   Pulse Oximeter Device Location Finger   O2 Device None (Room air)

## 2024-05-27 NOTE — PLAN OF CARE
Problem: Hematologic - Adult  Goal: Maintains hematologic stability  Outcome: Progressing     Pt agitated and attempting to get OOB without assistance and pulling at male external catheter in place. Pt incontinent BM; pericare done per RN & PCA. Pt resistant to care, agitated, and BP elevated at 2329. No PRN BP meds to be given at this time.   RN evaluated BP at 0025 with pt resistant to care, remains confused/agitated, and attempting to hit staff while BP measure being taken. Attempted to provide reality orientation, but pt remains disoriented to place, time, and situation. Gave Hydralazine PRN dose at 0025; see MAR & all flowsheets    05/26/24 2329 05/27/24 0025   Vitals   Temp 98.1 °F (36.7 °C)  --    Temp Source Oral  --    Pulse 89  88   Heart Rate Source Telemetry  brachial   Respirations 20  --    BP (!) 176/78 (!) 179/82   MAP (Calculated) 111 114   BP Location Right upper arm  --    BP Upper/Lower Upper  --    BP Method Automatic  --    Patient Position Semi fowlers  --    Cardiac Rhythm Sinus rhythm  --    Oxygen Therapy   SpO2 98 %  --    Pulse Oximetry Type Intermittent  --    Pulse Oximeter Device Location Finger  --    O2 Device None (Room air)  --

## 2024-05-27 NOTE — FLOWSHEET NOTE
Pt's son Cole called and requested RN to call back at [phone]. RN returned call at 2120 & d/w Cole. Cole requests to be the primary contact and if anyone calls for updates on patient, they need to be redirected to contact Cole directly for information. Cole states that he will be up to see pt tomorrow.

## 2024-05-27 NOTE — FLOWSHEET NOTE
Hospitalist Christine Bolanos messaged via [facility] at 1:06 AM, \"Pt admitted 5/25/24 with AMS, HTN, ABD pain. Pt remains disoriented to time, place, & situtation and is resistant to care. Pt was swatting at staff tonight when BP taken and hydralazine IV given per parameters. Pt pulling off leads/telemetry overnight but is more resistant as the night progresses and refuses to wear telemetry.\"                                      NP Read at 1:08 AM and responded at 1:09 AM, \"Ok to hold off on telemetry tonight. Order is in for sitter\"    RN responded at 1:10 AM, \"OK thanks. I updated charge RN now\"   Nutrition Care Plan    Nutrition Diagnosis:   Inadequate intake related to nausea, vomiting, and fatigue as evidenced by weight loss of 3.1 kg (4.5%) in the last 2 months per EHR and patient reporting poor appetite for the last couple of months but worsening over the last several days with estimated energy intake less than 50% of needs.  Resolved.    Intervention:  Writer spoke with patient for follow up of poor appetite and length of stay.  Appetite has improved, no concerns with meals.    Monitoring and Evaluation:  Amount of food: Goal: Will consume >75% of meals.  Goal met, consuming 100% of meals.  Continue as goal.

## 2024-05-28 ENCOUNTER — APPOINTMENT (OUTPATIENT)
Dept: MRI IMAGING | Age: 84
DRG: 305 | End: 2024-05-28
Payer: MEDICARE

## 2024-05-28 PROBLEM — G91.2 NPH (NORMAL PRESSURE HYDROCEPHALUS) (HCC): Status: ACTIVE | Noted: 2024-05-28

## 2024-05-28 LAB
ANION GAP SERPL CALCULATED.3IONS-SCNC: 10 MMOL/L (ref 3–16)
BASOPHILS # BLD: 0.1 K/UL (ref 0–0.2)
BASOPHILS NFR BLD: 0.6 %
BUN SERPL-MCNC: 25 MG/DL (ref 7–20)
CALCIUM SERPL-MCNC: 9.2 MG/DL (ref 8.3–10.6)
CHLORIDE SERPL-SCNC: 106 MMOL/L (ref 99–110)
CO2 SERPL-SCNC: 24 MMOL/L (ref 21–32)
CREAT SERPL-MCNC: 1.1 MG/DL (ref 0.8–1.3)
DEPRECATED RDW RBC AUTO: 13.5 % (ref 12.4–15.4)
EOSINOPHIL # BLD: 0.2 K/UL (ref 0–0.6)
EOSINOPHIL NFR BLD: 2 %
GFR SERPLBLD CREATININE-BSD FMLA CKD-EPI: 66 ML/MIN/{1.73_M2}
GLUCOSE SERPL-MCNC: 84 MG/DL (ref 70–99)
HCT VFR BLD AUTO: 41.9 % (ref 40.5–52.5)
HGB BLD-MCNC: 13.8 G/DL (ref 13.5–17.5)
LYMPHOCYTES # BLD: 4.1 K/UL (ref 1–5.1)
LYMPHOCYTES NFR BLD: 40.9 %
MCH RBC QN AUTO: 30.8 PG (ref 26–34)
MCHC RBC AUTO-ENTMCNC: 32.9 G/DL (ref 31–36)
MCV RBC AUTO: 93.6 FL (ref 80–100)
MONOCYTES # BLD: 0.9 K/UL (ref 0–1.3)
MONOCYTES NFR BLD: 9.4 %
NEUTROPHILS # BLD: 4.7 K/UL (ref 1.7–7.7)
NEUTROPHILS NFR BLD: 47.1 %
PLATELET # BLD AUTO: 269 K/UL (ref 135–450)
PMV BLD AUTO: 9.4 FL (ref 5–10.5)
POTASSIUM SERPL-SCNC: 5 MMOL/L (ref 3.5–5.1)
PROCALCITONIN SERPL IA-MCNC: 0.12 NG/ML (ref 0–0.15)
RBC # BLD AUTO: 4.48 M/UL (ref 4.2–5.9)
SODIUM SERPL-SCNC: 140 MMOL/L (ref 136–145)
TROPONIN, HIGH SENSITIVITY: 16 NG/L (ref 0–22)
WBC # BLD AUTO: 9.9 K/UL (ref 4–11)

## 2024-05-28 PROCEDURE — 93005 ELECTROCARDIOGRAM TRACING: CPT | Performed by: INTERNAL MEDICINE

## 2024-05-28 PROCEDURE — 85025 COMPLETE CBC W/AUTO DIFF WBC: CPT

## 2024-05-28 PROCEDURE — 70551 MRI BRAIN STEM W/O DYE: CPT

## 2024-05-28 PROCEDURE — 84145 PROCALCITONIN (PCT): CPT

## 2024-05-28 PROCEDURE — 36415 COLL VENOUS BLD VENIPUNCTURE: CPT

## 2024-05-28 PROCEDURE — 80048 BASIC METABOLIC PNL TOTAL CA: CPT

## 2024-05-28 PROCEDURE — 6370000000 HC RX 637 (ALT 250 FOR IP): Performed by: INTERNAL MEDICINE

## 2024-05-28 PROCEDURE — 6360000002 HC RX W HCPCS: Performed by: INTERNAL MEDICINE

## 2024-05-28 PROCEDURE — 2060000000 HC ICU INTERMEDIATE R&B

## 2024-05-28 PROCEDURE — 84484 ASSAY OF TROPONIN QUANT: CPT

## 2024-05-28 PROCEDURE — 2580000003 HC RX 258: Performed by: INTERNAL MEDICINE

## 2024-05-28 RX ORDER — HYDROCODONE BITARTRATE AND ACETAMINOPHEN 5; 325 MG/1; MG/1
1 TABLET ORAL EVERY 6 HOURS PRN
Status: DISCONTINUED | OUTPATIENT
Start: 2024-05-28 | End: 2024-05-30 | Stop reason: HOSPADM

## 2024-05-28 RX ADMIN — AMLODIPINE BESYLATE 5 MG: 5 TABLET ORAL at 08:27

## 2024-05-28 RX ADMIN — BRIMONIDINE TARTRATE 1 DROP: 2 SOLUTION OPHTHALMIC at 21:48

## 2024-05-28 RX ADMIN — ATENOLOL 100 MG: 50 TABLET ORAL at 08:27

## 2024-05-28 RX ADMIN — LATANOPROST 1 DROP: 50 SOLUTION OPHTHALMIC at 21:49

## 2024-05-28 RX ADMIN — HYDROCODONE BITARTRATE AND ACETAMINOPHEN 1 TABLET: 5; 325 TABLET ORAL at 16:27

## 2024-05-28 RX ADMIN — TIMOLOL MALEATE 1 DROP: 5 SOLUTION OPHTHALMIC at 21:47

## 2024-05-28 RX ADMIN — DORZOLAMIDE HYDROCHLORIDE 1 DROP: 20 SOLUTION/ DROPS OPHTHALMIC at 09:52

## 2024-05-28 RX ADMIN — MIRTAZAPINE 7.5 MG: 15 TABLET, FILM COATED ORAL at 21:47

## 2024-05-28 RX ADMIN — DORZOLAMIDE HYDROCHLORIDE 1 DROP: 20 SOLUTION/ DROPS OPHTHALMIC at 12:17

## 2024-05-28 RX ADMIN — ATORVASTATIN CALCIUM 80 MG: 80 TABLET, FILM COATED ORAL at 08:27

## 2024-05-28 RX ADMIN — SODIUM CHLORIDE, PRESERVATIVE FREE 10 ML: 5 INJECTION INTRAVENOUS at 21:49

## 2024-05-28 RX ADMIN — SODIUM CHLORIDE, PRESERVATIVE FREE 10 ML: 5 INJECTION INTRAVENOUS at 08:28

## 2024-05-28 RX ADMIN — ENOXAPARIN SODIUM 40 MG: 100 INJECTION SUBCUTANEOUS at 08:28

## 2024-05-28 RX ADMIN — DORZOLAMIDE HYDROCHLORIDE 1 DROP: 20 SOLUTION/ DROPS OPHTHALMIC at 21:47

## 2024-05-28 RX ADMIN — LABETALOL HYDROCHLORIDE 10 MG: 5 INJECTION, SOLUTION INTRAVENOUS at 00:38

## 2024-05-28 RX ADMIN — ASPIRIN 81 MG: 81 TABLET, CHEWABLE ORAL at 08:28

## 2024-05-28 RX ADMIN — TIMOLOL MALEATE 1 DROP: 5 SOLUTION OPHTHALMIC at 09:52

## 2024-05-28 RX ADMIN — LISINOPRIL 40 MG: 20 TABLET ORAL at 08:28

## 2024-05-28 RX ADMIN — BRIMONIDINE TARTRATE 1 DROP: 2 SOLUTION OPHTHALMIC at 09:52

## 2024-05-28 RX ADMIN — SODIUM CHLORIDE, PRESERVATIVE FREE 10 ML: 5 INJECTION INTRAVENOUS at 00:39

## 2024-05-28 ASSESSMENT — PAIN SCALES - GENERAL
PAINLEVEL_OUTOF10: 0
PAINLEVEL_OUTOF10: 8
PAINLEVEL_OUTOF10: 8

## 2024-05-28 ASSESSMENT — PAIN DESCRIPTION - DESCRIPTORS: DESCRIPTORS: HEAVINESS;JABBING

## 2024-05-28 ASSESSMENT — PAIN DESCRIPTION - ORIENTATION: ORIENTATION: RIGHT

## 2024-05-28 ASSESSMENT — PAIN DESCRIPTION - LOCATION: LOCATION: CHEST

## 2024-05-28 ASSESSMENT — PAIN DESCRIPTION - FREQUENCY: FREQUENCY: CONTINUOUS

## 2024-05-28 NOTE — DISCHARGE INSTR - COC
of Unplanned Readmission:  19           Discharging to Facility/ Agency   Name:  Home at [facility]  [address] [place]  [address]  Report: [phone]  Fax: [phone]    / signature: Electronically signed by JEFF Diane, JOSE L on 5/28/24 at 1:06 PM EDT    PHYSICIAN SECTION    Prognosis: Guarded    Condition at Discharge: Stable    Rehab Potential (if transferring to Rehab): Good    Recommended Labs or Other Treatments After Discharge: ***    Physician Certification: I certify the above information and transfer of Jelani Martínez  is necessary for the continuing treatment of the diagnosis listed and that he requires Skilled Nursing Facility for greater 30 days.     Update Admission H&P: No change in H&P    PHYSICIAN SIGNATURE:  Electronically signed by Aashish Roche MD on 5/30/24 at 8:40 AM EDT

## 2024-05-28 NOTE — PLAN OF CARE
Problem: Discharge Planning  Goal: Discharge to home or other facility with appropriate resources  Outcome: Progressing     Problem: Safety - Adult  Goal: Free from fall injury  Outcome: Progressing     Problem: ABCDS Injury Assessment  Goal: Absence of physical injury  Outcome: Progressing     Problem: Skin/Tissue Integrity  Goal: Absence of new skin breakdown  Description: 1.  Monitor for areas of redness and/or skin breakdown  2.  Assess vascular access sites hourly  3.  Every 4-6 hours minimum:  Change oxygen saturation probe site  4.  Every 4-6 hours:  If on nasal continuous positive airway pressure, respiratory therapy assess nares and determine need for appliance change or resting period.  Outcome: Progressing     Problem: Pain  Goal: Verbalizes/displays adequate comfort level or baseline comfort level  Outcome: Progressing     Problem: Hematologic - Adult  Goal: Maintains hematologic stability  Outcome: Progressing

## 2024-05-28 NOTE — CONSULTS
Consult received for Jelani Martínez. 83 year old man presenting with confusion and AMS. Found to have hypertensive emergency. MRI with ventriculmegaly. Callosal angle is 85 (typically 50-80 in the setting of NPH, > 80 in ex vacuo).     If inpatient neurosurgical consultation desired, recommend transfer to [facility]. Otherwise will plan to see patient as outpatient to determine whether he would benefit from a high volume lumbar puncture versus lumbar drain trial for assessment of NPH.     Drew Farley  Neurosurgeon  [facility]  [phone]  8:21 AM

## 2024-05-28 NOTE — CARE COORDINATION
Case Management Assessment  Initial Evaluation    Date/Time of Evaluation: 5/28/2024 1:09 PM  Assessment Completed by: JEFF Diane, LSW    If patient is discharged prior to next notation, then this note serves as note for discharge by case management.    Patient Name: Jelani Martínez                   YOB: 1940  Diagnosis: Acute abdominal pain [R10.9]  Accelerated hypertension [I10]  Acute encephalopathy [G93.40]  Hypertensive emergency [I16.1]                   Date / Time: 5/25/2024  2:05 PM    Patient Admission Status: Inpatient   Readmission Risk (Low < 19, Mod (19-27), High > 27): Readmission Risk Score: 17.2    Current PCP: Ignacio Cortez MD  PCP verified by CM? Yes    Chart Reviewed: Yes      History Provided by: Other (see comment) (Facility admissions liaison, Marlen)  Patient Orientation: Other (see comment) (AMS)    Patient Cognition: Other (see comment) (Has AMS)    Hospitalization in the last 30 days (Readmission):  No    If yes, Readmission Assessment in  Navigator will be completed.    Advance Directives:      Code Status: Full Code   Patient's Primary Decision Maker is:      Primary Decision Maker: Cole Martínez - Child - [phone]    Secondary Decision Maker: Mamta Deleon - Child - [phone]    Discharge Planning:    Patient expects to discharge to: Long-term care  Plan for transportation at discharge:      Financial    Payor: [employer] MEDICARE / Plan: [employer] GOLD PLUS HMO / Product Type: *No Product type* /         Current Nursing Home Information:  Patient admitted from:    Home at [address]  [address]  Report: [phone]  Fax: [phone]      Call to [place], [phone], at admissions who confirmed the patient is: Long Term Care, no Precert required for return.      Patient Covid vaccination status:    Internal Administration   First Dose COVID-19, PFIZER PURPLE top, DILUTE for use, (age 12 y+),

## 2024-05-29 LAB
ANION GAP SERPL CALCULATED.3IONS-SCNC: 9 MMOL/L (ref 3–16)
BACTERIA BLD CULT ORG #2: NORMAL
BACTERIA BLD CULT: NORMAL
BASOPHILS # BLD: 0.1 K/UL (ref 0–0.2)
BASOPHILS NFR BLD: 0.9 %
BUN SERPL-MCNC: 30 MG/DL (ref 7–20)
CALCIUM SERPL-MCNC: 9.1 MG/DL (ref 8.3–10.6)
CHLORIDE SERPL-SCNC: 106 MMOL/L (ref 99–110)
CO2 SERPL-SCNC: 28 MMOL/L (ref 21–32)
CREAT SERPL-MCNC: 1.3 MG/DL (ref 0.8–1.3)
DEPRECATED RDW RBC AUTO: 13.6 % (ref 12.4–15.4)
EKG ATRIAL RATE: 56 BPM
EKG DIAGNOSIS: NORMAL
EKG P AXIS: 80 DEGREES
EKG P-R INTERVAL: 172 MS
EKG Q-T INTERVAL: 476 MS
EKG QRS DURATION: 130 MS
EKG QTC CALCULATION (BAZETT): 459 MS
EKG R AXIS: 37 DEGREES
EKG T AXIS: -9 DEGREES
EKG VENTRICULAR RATE: 56 BPM
EOSINOPHIL # BLD: 0.3 K/UL (ref 0–0.6)
EOSINOPHIL NFR BLD: 3.2 %
GFR SERPLBLD CREATININE-BSD FMLA CKD-EPI: 54 ML/MIN/{1.73_M2}
GLUCOSE SERPL-MCNC: 91 MG/DL (ref 70–99)
HCT VFR BLD AUTO: 39.2 % (ref 40.5–52.5)
HGB BLD-MCNC: 12.9 G/DL (ref 13.5–17.5)
LYMPHOCYTES # BLD: 3.6 K/UL (ref 1–5.1)
LYMPHOCYTES NFR BLD: 39.2 %
MCH RBC QN AUTO: 31 PG (ref 26–34)
MCHC RBC AUTO-ENTMCNC: 32.9 G/DL (ref 31–36)
MCV RBC AUTO: 94 FL (ref 80–100)
MONOCYTES # BLD: 0.8 K/UL (ref 0–1.3)
MONOCYTES NFR BLD: 9.1 %
NEUTROPHILS # BLD: 4.3 K/UL (ref 1.7–7.7)
NEUTROPHILS NFR BLD: 47.6 %
PLATELET # BLD AUTO: 256 K/UL (ref 135–450)
PMV BLD AUTO: 9.8 FL (ref 5–10.5)
POTASSIUM SERPL-SCNC: 5.5 MMOL/L (ref 3.5–5.1)
RBC # BLD AUTO: 4.18 M/UL (ref 4.2–5.9)
SODIUM SERPL-SCNC: 143 MMOL/L (ref 136–145)
WBC # BLD AUTO: 9.1 K/UL (ref 4–11)

## 2024-05-29 PROCEDURE — 2060000000 HC ICU INTERMEDIATE R&B

## 2024-05-29 PROCEDURE — 85025 COMPLETE CBC W/AUTO DIFF WBC: CPT

## 2024-05-29 PROCEDURE — 36415 COLL VENOUS BLD VENIPUNCTURE: CPT

## 2024-05-29 PROCEDURE — 80048 BASIC METABOLIC PNL TOTAL CA: CPT

## 2024-05-29 PROCEDURE — 6360000002 HC RX W HCPCS: Performed by: INTERNAL MEDICINE

## 2024-05-29 PROCEDURE — 2580000003 HC RX 258: Performed by: INTERNAL MEDICINE

## 2024-05-29 PROCEDURE — 6370000000 HC RX 637 (ALT 250 FOR IP): Performed by: INTERNAL MEDICINE

## 2024-05-29 RX ORDER — HYDROXYZINE HYDROCHLORIDE 10 MG/1
10 TABLET, FILM COATED ORAL 3 TIMES DAILY PRN
Status: DISCONTINUED | OUTPATIENT
Start: 2024-05-29 | End: 2024-05-30 | Stop reason: HOSPADM

## 2024-05-29 RX ADMIN — AMLODIPINE BESYLATE 5 MG: 5 TABLET ORAL at 08:40

## 2024-05-29 RX ADMIN — SODIUM CHLORIDE, PRESERVATIVE FREE 10 ML: 5 INJECTION INTRAVENOUS at 08:40

## 2024-05-29 RX ADMIN — MIRTAZAPINE 7.5 MG: 15 TABLET, FILM COATED ORAL at 20:39

## 2024-05-29 RX ADMIN — SODIUM CHLORIDE, PRESERVATIVE FREE 10 ML: 5 INJECTION INTRAVENOUS at 20:40

## 2024-05-29 RX ADMIN — LISINOPRIL 40 MG: 20 TABLET ORAL at 08:40

## 2024-05-29 RX ADMIN — ENOXAPARIN SODIUM 40 MG: 100 INJECTION SUBCUTANEOUS at 08:40

## 2024-05-29 RX ADMIN — DORZOLAMIDE HYDROCHLORIDE 1 DROP: 20 SOLUTION/ DROPS OPHTHALMIC at 20:40

## 2024-05-29 RX ADMIN — BRIMONIDINE TARTRATE 1 DROP: 2 SOLUTION OPHTHALMIC at 20:40

## 2024-05-29 RX ADMIN — TIMOLOL MALEATE 1 DROP: 5 SOLUTION OPHTHALMIC at 20:40

## 2024-05-29 RX ADMIN — TIMOLOL MALEATE 1 DROP: 5 SOLUTION OPHTHALMIC at 08:40

## 2024-05-29 RX ADMIN — ASPIRIN 81 MG: 81 TABLET, CHEWABLE ORAL at 08:40

## 2024-05-29 RX ADMIN — HYDROXYZINE HYDROCHLORIDE 10 MG: 10 TABLET ORAL at 18:17

## 2024-05-29 RX ADMIN — LABETALOL HYDROCHLORIDE 10 MG: 5 INJECTION, SOLUTION INTRAVENOUS at 20:40

## 2024-05-29 RX ADMIN — BRIMONIDINE TARTRATE 1 DROP: 2 SOLUTION OPHTHALMIC at 08:40

## 2024-05-29 RX ADMIN — ATORVASTATIN CALCIUM 80 MG: 80 TABLET, FILM COATED ORAL at 08:40

## 2024-05-29 RX ADMIN — DORZOLAMIDE HYDROCHLORIDE 1 DROP: 20 SOLUTION/ DROPS OPHTHALMIC at 13:15

## 2024-05-29 RX ADMIN — DORZOLAMIDE HYDROCHLORIDE 1 DROP: 20 SOLUTION/ DROPS OPHTHALMIC at 08:40

## 2024-05-29 RX ADMIN — ATENOLOL 100 MG: 50 TABLET ORAL at 08:40

## 2024-05-29 RX ADMIN — LATANOPROST 1 DROP: 50 SOLUTION OPHTHALMIC at 20:41

## 2024-05-29 ASSESSMENT — PAIN SCALES - GENERAL: PAINLEVEL_OUTOF10: 0

## 2024-05-30 VITALS
WEIGHT: 148.2 LBS | DIASTOLIC BLOOD PRESSURE: 81 MMHG | HEIGHT: 71 IN | HEART RATE: 61 BPM | OXYGEN SATURATION: 99 % | RESPIRATION RATE: 16 BRPM | SYSTOLIC BLOOD PRESSURE: 157 MMHG | TEMPERATURE: 98 F | BODY MASS INDEX: 20.75 KG/M2

## 2024-05-30 LAB
ALBUMIN SERPL-MCNC: 3.2 G/DL (ref 3.4–5)
ALP SERPL-CCNC: 124 U/L (ref 40–129)
ALT SERPL-CCNC: 28 U/L (ref 10–40)
ANION GAP SERPL CALCULATED.3IONS-SCNC: 8 MMOL/L (ref 3–16)
AST SERPL-CCNC: 35 U/L (ref 15–37)
BASOPHILS # BLD: 0.1 K/UL (ref 0–0.2)
BASOPHILS NFR BLD: 0.9 %
BILIRUB DIRECT SERPL-MCNC: <0.2 MG/DL (ref 0–0.3)
BILIRUB INDIRECT SERPL-MCNC: ABNORMAL MG/DL (ref 0–1)
BILIRUB SERPL-MCNC: 0.3 MG/DL (ref 0–1)
BUN SERPL-MCNC: 28 MG/DL (ref 7–20)
CALCIUM SERPL-MCNC: 9.2 MG/DL (ref 8.3–10.6)
CHLORIDE SERPL-SCNC: 106 MMOL/L (ref 99–110)
CO2 SERPL-SCNC: 25 MMOL/L (ref 21–32)
CREAT SERPL-MCNC: 1 MG/DL (ref 0.8–1.3)
DEPRECATED RDW RBC AUTO: 13.5 % (ref 12.4–15.4)
EOSINOPHIL # BLD: 0.2 K/UL (ref 0–0.6)
EOSINOPHIL NFR BLD: 2.6 %
GFR SERPLBLD CREATININE-BSD FMLA CKD-EPI: 74 ML/MIN/{1.73_M2}
GLUCOSE SERPL-MCNC: 93 MG/DL (ref 70–99)
HCT VFR BLD AUTO: 40.3 % (ref 40.5–52.5)
HGB BLD-MCNC: 13.3 G/DL (ref 13.5–17.5)
LYMPHOCYTES # BLD: 2.5 K/UL (ref 1–5.1)
LYMPHOCYTES NFR BLD: 28.6 %
MCH RBC QN AUTO: 31.1 PG (ref 26–34)
MCHC RBC AUTO-ENTMCNC: 32.9 G/DL (ref 31–36)
MCV RBC AUTO: 94.6 FL (ref 80–100)
MONOCYTES # BLD: 0.7 K/UL (ref 0–1.3)
MONOCYTES NFR BLD: 8.1 %
NEUTROPHILS # BLD: 5.3 K/UL (ref 1.7–7.7)
NEUTROPHILS NFR BLD: 59.8 %
PLATELET # BLD AUTO: 230 K/UL (ref 135–450)
PMV BLD AUTO: 9.9 FL (ref 5–10.5)
POTASSIUM SERPL-SCNC: 5.3 MMOL/L (ref 3.5–5.1)
PROCALCITONIN SERPL IA-MCNC: 0.12 NG/ML (ref 0–0.15)
PROT SERPL-MCNC: 7.2 G/DL (ref 6.4–8.2)
RBC # BLD AUTO: 4.26 M/UL (ref 4.2–5.9)
SODIUM SERPL-SCNC: 139 MMOL/L (ref 136–145)
WBC # BLD AUTO: 8.8 K/UL (ref 4–11)

## 2024-05-30 PROCEDURE — 36415 COLL VENOUS BLD VENIPUNCTURE: CPT

## 2024-05-30 PROCEDURE — 6360000002 HC RX W HCPCS: Performed by: NURSE PRACTITIONER

## 2024-05-30 PROCEDURE — A4216 STERILE WATER/SALINE, 10 ML: HCPCS | Performed by: NURSE PRACTITIONER

## 2024-05-30 PROCEDURE — 85025 COMPLETE CBC W/AUTO DIFF WBC: CPT

## 2024-05-30 PROCEDURE — 2580000003 HC RX 258: Performed by: INTERNAL MEDICINE

## 2024-05-30 PROCEDURE — 2580000003 HC RX 258: Performed by: NURSE PRACTITIONER

## 2024-05-30 PROCEDURE — 6360000002 HC RX W HCPCS: Performed by: INTERNAL MEDICINE

## 2024-05-30 PROCEDURE — 80076 HEPATIC FUNCTION PANEL: CPT

## 2024-05-30 PROCEDURE — 6370000000 HC RX 637 (ALT 250 FOR IP): Performed by: INTERNAL MEDICINE

## 2024-05-30 PROCEDURE — 80048 BASIC METABOLIC PNL TOTAL CA: CPT

## 2024-05-30 PROCEDURE — 84145 PROCALCITONIN (PCT): CPT

## 2024-05-30 RX ORDER — AMLODIPINE BESYLATE 5 MG/1
5 TABLET ORAL DAILY
Qty: 30 TABLET | Refills: 3 | Status: SHIPPED | OUTPATIENT
Start: 2024-05-30

## 2024-05-30 RX ORDER — HYDROXYZINE HYDROCHLORIDE 10 MG/1
10 TABLET, FILM COATED ORAL 3 TIMES DAILY PRN
Qty: 90 TABLET | Refills: 0 | Status: SHIPPED | OUTPATIENT
Start: 2024-05-30 | End: 2024-06-29

## 2024-05-30 RX ADMIN — LISINOPRIL 40 MG: 20 TABLET ORAL at 08:42

## 2024-05-30 RX ADMIN — AMLODIPINE BESYLATE 5 MG: 5 TABLET ORAL at 08:42

## 2024-05-30 RX ADMIN — ATORVASTATIN CALCIUM 80 MG: 80 TABLET, FILM COATED ORAL at 08:42

## 2024-05-30 RX ADMIN — DORZOLAMIDE HYDROCHLORIDE 1 DROP: 20 SOLUTION/ DROPS OPHTHALMIC at 08:43

## 2024-05-30 RX ADMIN — ASPIRIN 81 MG: 81 TABLET, CHEWABLE ORAL at 08:42

## 2024-05-30 RX ADMIN — ENOXAPARIN SODIUM 40 MG: 100 INJECTION SUBCUTANEOUS at 08:42

## 2024-05-30 RX ADMIN — TIMOLOL MALEATE 1 DROP: 5 SOLUTION OPHTHALMIC at 08:43

## 2024-05-30 RX ADMIN — ATENOLOL 100 MG: 50 TABLET ORAL at 08:42

## 2024-05-30 RX ADMIN — SODIUM CHLORIDE, PRESERVATIVE FREE 10 ML: 5 INJECTION INTRAVENOUS at 08:42

## 2024-05-30 RX ADMIN — SODIUM CHLORIDE 0.5 MG: 9 INJECTION INTRAMUSCULAR; INTRAVENOUS; SUBCUTANEOUS at 00:00

## 2024-05-30 RX ADMIN — DORZOLAMIDE HYDROCHLORIDE 1 DROP: 20 SOLUTION/ DROPS OPHTHALMIC at 13:46

## 2024-05-30 RX ADMIN — BRIMONIDINE TARTRATE 1 DROP: 2 SOLUTION OPHTHALMIC at 08:43

## 2024-05-30 ASSESSMENT — PAIN SCALES - GENERAL: PAINLEVEL_OUTOF10: 0

## 2024-05-30 NOTE — CARE COORDINATION
SW informed pt was ready for discharge back to Home at [facility].  Transport was set at 2pm with [facility].  Discharge packet completed.  Son, RN and facility informed of time.  All documents faxed.    Discharge Plan:  Home at [facility]  [address] [place]  [address]  Report: [phone]  Fax: [phone]    Transport w/[facility] at 2pm today    Electronically signed by JEFF Diane, MIESHAW on 5/30/2024 at 12:39 PM

## 2024-05-30 NOTE — PROGRESS NOTES
05/25/24 2149   RT Protocol   History Pulmonary Disease 0   Respiratory pattern 0   Breath sounds 0   Cough 0   Indications for Bronchodilator Therapy On home bronchodilators   Bronchodilator Assessment Score 0       
   05/29/24 0034   RT Protocol   History Pulmonary Disease 0   Respiratory pattern 0   Breath sounds 0   Cough 0   Indications for Bronchodilator Therapy On home bronchodilators   Bronchodilator Assessment Score 0       
Completed admission. Oriented to room, bed, and call light. Passed scheduled meds. Denies needs, call light in reach, bed alarm in placed. Filled out MRI form over the phone with ELIER Perez.   
Data- discharge order received, pt or son (appointed legal authority) verbalized agreement to discharge, disposition to [facility] #[phone], CHE reviewed and signed by physician.    Action- AVS prepared, CHE completed/ reported faxed by case management/. Discharge instruction summary: Diet- regular, Activity- as tolerated, immunizations reviewed and up to date, medications prescriptions to be filled at receiving facility except for the controlled prescriptions to be sent: to preferred pharmacy, Transfer code status: Full Code, LDAs to remain with discharge: n/a.      Response- Bedside RN to call report to receiving facility. Pt belongings gathered, peripheral IV and cardiac monitoring removed. Disposition to Discharged via cart/stretcher to skilled nursing by EMS transportation, no complications reported.       1. WEIGHT: Admit Weight - Scale: 67.7 kg (149 lb 4.8 oz) (05/25/24 2228)        Today  Weight - Scale: 67.2 kg (148 lb 3.2 oz) (05/30/24 0748)       2. O2 SAT.: SpO2: 99 % (05/30/24 1125)  
LOLITA notified by RN of patient resistance to care.  Patient does have altered mental status currently undergoing encephalopathic workup.  -Patient is removing her telemetry leads  -Patient resistant to care by staff  -Will get sitter at bedside to maintain patient's safety  -Okay to leave telemetry off tonight  -Frequent reorientation, avoid stimuli at night, keep vitals minimal at night, open blinds during the day, avoid lines and catheters if possible, bowel regimens, pain control  -Further pending reeval by attending in a.m.    LOLITA Posey - NP     
Occupational Therapy  Pt refused OT @3:56 due to severe pain in upper R chest. RN made aware. Pt requesting OT follow up tomorrow.     Thank you,  Destiney Perry, [facility] OTR/L [id number]   
Progress Note - Dr. Roche - Internal Medicine  PCP: Ignacio Cortez  [address] / [facility] [address] [phone]    Hospital Day: 2  Code Status: Full Code  Current Diet: ADULT DIET; Regular; No Added Salt (3-4 gm)  ADULT ORAL NUTRITION SUPPLEMENT; Breakfast, Lunch, Dinner; Standard High Calorie/High Protein Oral Supplement        CC: follow up on medical issues    Subjective:   Jelani Martínez is a 83 y.o. male.    Pt seen and examined  Chart reviewed since last visit, labs and imaging below      Doing ok  No new issues but still confused/disorientated  Sitter ordered    Mri brain still pending  BP still elevated  Lis increased to 40 on 5/26, amlodipine added 5/27      Review of Systems: (1 system for EPF, 2-9 for detailed, 10+ for comprehensive)  Cannot obtain from pt    I have reviewed the patient's medical and social history in detail and updated the computerized patient record.  To recap: He  has a past medical history of Acute prostatitis, Benign prostatic hyperplasia without lower urinary tract symptoms, Carpal tunnel syndrome, Cerebral infarction (HCC), Cognitive communication deficit, COVID-19, Emphysema, unspecified (HCC), Hyperlipidemia, Hypertension, Muscle weakness, Neuralgia and neuritis, Nicotine dependence, Polyosteoarthritis, Radiculopathy, TIA (transient ischemic attack), and Urge incontinence.. He  has a past surgical history that includes Tonsillectomy and Lung removal, partial (Right).. He  reports that he quit smoking about 37 years ago. His smoking use included cigarettes. He started smoking about 40 years ago. He has a 0.8 pack-year smoking history. He has never used smokeless tobacco. He reports that he does not drink alcohol and does not use drugs..        Active Hospital Problems    Diagnosis Date Noted    Hypertensive emergency [I16.1] 05/25/2024    Hypertensive encephalopathy [I67.4] 05/25/2024    Benign prostatic hyperplasia without lower urinary tract symptoms [N40.0]     
Progress Note - Dr. Roche - Internal Medicine  PCP: Ignacio Cortez  [address] / [facility] [address] [phone]    Hospital Day: 1  Code Status: Full Code  Current Diet: ADULT DIET; Regular; No Added Salt (3-4 gm)  ADULT ORAL NUTRITION SUPPLEMENT; Breakfast, Lunch, Dinner; Standard High Calorie/High Protein Oral Supplement        CC: follow up on medical issues    Subjective:   Jelani Martínez is a 83 y.o. male.    Pt seen and examined  Chart reviewed since last visit, labs and imaging below      Doing ok  No new issues  Still confused  Mri brain still pending  BP is a little better but still elevated  Lis increased to 40      Review of Systems: (1 system for EPF, 2-9 for detailed, 10+ for comprehensive)  Cannot obtain from pt    I have reviewed the patient's medical and social history in detail and updated the computerized patient record.  To recap: He  has a past medical history of Acute prostatitis, Benign prostatic hyperplasia without lower urinary tract symptoms, Carpal tunnel syndrome, Cerebral infarction (HCC), Cognitive communication deficit, COVID-19, Emphysema, unspecified (HCC), Hyperlipidemia, Hypertension, Muscle weakness, Neuralgia and neuritis, Nicotine dependence, Polyosteoarthritis, Radiculopathy, TIA (transient ischemic attack), and Urge incontinence.. He  has a past surgical history that includes Tonsillectomy and Lung removal, partial (Right).. He  reports that he quit smoking about 37 years ago. His smoking use included cigarettes. He started smoking about 40 years ago. He has a 0.8 pack-year smoking history. He has never used smokeless tobacco. He reports that he does not drink alcohol and does not use drugs..        Active Hospital Problems    Diagnosis Date Noted    Hypertensive emergency [I16.1] 05/25/2024    Hypertensive encephalopathy [I67.4] 05/25/2024    Benign prostatic hyperplasia without lower urinary tract symptoms [N40.0]     Altered mental status [R41.82]     Severe 
Pt admitted for HTN emergency, encephalopathy    Full h+p to follow    Active Hospital Problems    Diagnosis Date Noted    Hypertensive emergency [I16.1] 05/25/2024    Hypertensive encephalopathy [I67.4] 05/25/2024    Benign prostatic hyperplasia without lower urinary tract symptoms [N40.0]     Altered mental status [R41.82]     Severe vascular dementia (HCC) [F01.C0]        Please use [employer] to contact me with any questions during the day.   The hospitalist service will provide cross-coverage for this patient from 7pm to 7am.    During those hours, contact the on-call hospitalist MD/SUJEY for questions.    
Report called to Cynthia POWERS at [facility] ([phone]). All questions answered and she is aware of transport time of 1400 today.   
LOB.  Comments:    Stair Mobility:  Stair mobility not completed on this date.  Comments:  Wheelchair Mobility:  No w/c mobility completed on this date.  Comments:  Balance:  Static Sitting Balance: fair (+): maintains balance at SBA/supervision without use of UE support  Dynamic Sitting Balance: fair: maintains balance at CGA without use of UE support  Static Standing Balance: poor (+): requires min (A) to maintain balance  Dynamic Standing Balance: poor (+): requires min (A) to maintain balance  Comments:    Other Therapeutic Interventions  Pt sitting up at EOB and completing oral care and ADL task with OT. See OT note for details.    Functional Outcomes  AM-PAC Inpatient Mobility Raw Score : 14              Cognition  Overall Cognitive Status: Impaired  Arousal/Alertness: delayed responses to stimuli  Following Commands: follows one step commands with repetition, follows one step commands with increased time  Attention Span: difficulty attending to directions, difficulty dividing attention  Memory: decreased recall of biographical information, decreased recall of precautions, decreased recall of recent events, decreased short term memory, decreased long term memory  Safety Judgement: decreased awareness of need for assistance, decreased awareness of need for safety  Problem Solving: assistance required to generate solutions, assistance required to implement solutions, decreased awareness of errors  Insights: decreased awareness of deficits  Initiation: requires cues for some  Sequencing: requires cues for some  Orientation:    oriented to person, disoriented to place, disoriented to time , and disoriented to situation  Command Following:   accurately follows one step commands    Education  Barriers To Learning: cognition  Patient Education: patient educated on goals, PT role and benefits, plan of care, general safety, discharge recommendations  Learning Assessment:  patient verbalizes understanding, would benefit 
Tolerance: poor   Impairments Requiring Therapeutic Intervention: decreased safety awareness, decreased cognition, decreased endurance  Prognosis: fair  Clinical Assessment: 83 year old male with history of vascular dementia, will benefit skilled OT in acute setting to facilitate procedural memory with ADL and transfer training.   Safety Interventions: patient left in bed, bed alarm in place, call light within reach, patient at risk for falls, sitter present, and nurse notified    Plan  Frequency: 2-3 times per week  Current Treatment Recommendations: transfer training and ADL/self-care training    Goals  Patient Goals: none stated   Short Term Goals:  Time Frame: discharge  Patient will complete upper body ADL at moderate assistance   Patient will complete lower body ADL at maximum assistance   Patient will complete self-feeding at stand by assistance, contact guard assistance   Patient will complete grooming at stand by assistance, contact guard assistance   Patient will complete functional transfers at contact guard assistance, minimal assistance     Above goals reviewed on 5/27/2024.  All goals are ongoing at this time unless indicated above.       Therapy Session Time     Individual Group Co-treatment   Time In    1004   Time Out    1027   Minutes    23        Timed Code Treatment Minutes:   18  Total Treatment Minutes:  23       Electronically Signed By: GARRY Brown, OTR/L [id number]        
    No change in the nodular density in the left upper lobe.       Lab Results   Component Value Date/Time    GLUCOSE 91 05/29/2024 06:09 AM     Lab Results   Component Value Date/Time    POCGLU 120 01/04/2024 03:42 AM     BP (!) 158/67   Pulse 58   Temp 97.3 °F (36.3 °C) (Temporal)   Resp 17   Ht 1.803 m (5' 11\")   Wt 67.6 kg (149 lb 1.6 oz)   SpO2 98%   BMI 20.80 kg/m²     Assessment and Plan:  Principal Problem:    Hypertensive emergency -Established problem. Unstable. Still elevated BP - 158/67  Plan: cont iv antihypertensives. Continue home medications. Lis increased to 40 5/26, amlodipine added 5/27. Continue to check BP q shift. CBC and BMP ordered to monitor for disease progression, medication side effect.   Active Problems:    Altered mental status -Established problem. Stable.  Still altered  Plan: Cont to try to lower BP; MRI does not show cva    Benign prostatic hyperplasia without lower urinary tract symptoms  Plan: cont home meds    Severe vascular dementia (HCC)    Hypertensive encephalopathy -Established problem. Stable.    Plan: Continue present orders/plan.      Normal pressure hydrocephalus - Suspected based on MRI findings  Plan - consult N-surg to eval MRI; may need to transfer to [facility]    Case discussed with: RN  Tests ordered/reviewed: cbc, bmp, u/a, fsbs      Addendum - spoke with family, they do not want to pursue NPH workup; in any event, they have no interest in  shunt.  As such, no transfer required.  Will try to stabilize BP and hopefully return to LTC 5/30        (Please note that portions of this note were completed with a voice recognition program.  Efforts were made to edit the dictations but occasionally words are mis-transcribed.)        Aashish Roche MD  5/29/2024    Please use [employer] to contact me with any questions during the day.   The hospitalist service will provide cross-coverage for this patient from 7pm to 7am.    During those hours, contact the on-call 
1.803 m (5' 11\")   Wt 67.1 kg (148 lb)   SpO2 97%   BMI 20.64 kg/m²     Assessment and Plan:  Principal Problem:    Hypertensive emergency -Established problem. Unstable. Still elevated BP  Plan: cont iv antihypertensives. Continue home medications. Lis increased to 40 5/26, amlodipine added 5/27. Continue to check BP q shift. CBC and BMP ordered to monitor for disease progression, medication side effect.   Active Problems:    Altered mental status -Established problem. Stable.  Still altered  Plan: Cont to try to lower BP; MRI pending    Benign prostatic hyperplasia without lower urinary tract symptoms  Plan: cont home meds    Severe vascular dementia (HCC)    Hypertensive encephalopathy -Established problem. Stable.    Plan: Continue present orders/plan.      Normal pressure hydrocephalus - Suspected based on MRI findings  Plan - consult N-surg to eval MRI    Case discussed with: RN  Tests ordered/reviewed: cbc, bmp, u/a, fsbs    Total critical care time caring for this patient with life threatening illness, including direct patient contact, management of life support systems, review of data including imaging and labs, discussions with other team members and physicians at least 31 minutes today            (Please note that portions of this note were completed with a voice recognition program.  Efforts were made to edit the dictations but occasionally words are mis-transcribed.)        Aashish Roche MD  5/28/2024    Please use [employer] to contact me with any questions during the day.   The hospitalist service will provide cross-coverage for this patient from 7pm to 7am.    During those hours, contact the on-call hospitalist MD/SUJEY for questions.    
Preop testing

## 2024-05-30 NOTE — CARE COORDINATION
05/30/24 1239   IMM Letter   IMM Letter given to Patient/Family/Significant other/Guardian/POA/by: Case Management - ok given less than 4 hours to discharge   IMM Letter date given: 05/30/24   IMM Letter time given: 1220     Electronically signed by JEFF Diane, LSW on 5/30/2024 at 12:40 PM

## 2024-08-14 ENCOUNTER — APPOINTMENT (OUTPATIENT)
Dept: CT IMAGING | Age: 84
DRG: 872 | End: 2024-08-14
Payer: MEDICARE

## 2024-08-14 ENCOUNTER — APPOINTMENT (OUTPATIENT)
Dept: GENERAL RADIOLOGY | Age: 84
DRG: 872 | End: 2024-08-14
Payer: MEDICARE

## 2024-08-14 ENCOUNTER — HOSPITAL ENCOUNTER (INPATIENT)
Age: 84
LOS: 5 days | Discharge: NURSING, REHAB OR LONG-TERM CARE | DRG: 872 | End: 2024-08-19
Attending: STUDENT IN AN ORGANIZED HEALTH CARE EDUCATION/TRAINING PROGRAM | Admitting: INTERNAL MEDICINE
Payer: MEDICARE

## 2024-08-14 DIAGNOSIS — N30.00 ACUTE CYSTITIS WITHOUT HEMATURIA: ICD-10-CM

## 2024-08-14 DIAGNOSIS — A41.9 SEVERE SEPSIS (HCC): Primary | ICD-10-CM

## 2024-08-14 DIAGNOSIS — R65.20 SEVERE SEPSIS (HCC): Primary | ICD-10-CM

## 2024-08-14 PROBLEM — E86.0 DEHYDRATION: Status: ACTIVE | Noted: 2024-08-14

## 2024-08-14 PROBLEM — E87.20 LACTIC ACIDOSIS: Status: ACTIVE | Noted: 2024-08-14

## 2024-08-14 PROBLEM — N39.0 UTI (URINARY TRACT INFECTION): Status: ACTIVE | Noted: 2024-08-14

## 2024-08-14 LAB
ALBUMIN SERPL-MCNC: 3.3 G/DL (ref 3.4–5)
ALBUMIN/GLOB SERPL: 0.7 {RATIO} (ref 1.1–2.2)
ALP SERPL-CCNC: 173 U/L (ref 40–129)
ALT SERPL-CCNC: 29 U/L (ref 10–40)
ANION GAP SERPL CALCULATED.3IONS-SCNC: 14 MMOL/L (ref 3–16)
AST SERPL-CCNC: 40 U/L (ref 15–37)
BACTERIA URNS QL MICRO: ABNORMAL /HPF
BASOPHILS # BLD: 0 K/UL (ref 0–0.2)
BASOPHILS NFR BLD: 0.3 %
BILIRUB SERPL-MCNC: 0.7 MG/DL (ref 0–1)
BILIRUB UR QL STRIP.AUTO: NEGATIVE
BUN SERPL-MCNC: 23 MG/DL (ref 7–20)
CALCIUM SERPL-MCNC: 9.2 MG/DL (ref 8.3–10.6)
CHLORIDE SERPL-SCNC: 101 MMOL/L (ref 99–110)
CLARITY UR: ABNORMAL
CO2 SERPL-SCNC: 24 MMOL/L (ref 21–32)
COLOR UR: YELLOW
CREAT SERPL-MCNC: 1.3 MG/DL (ref 0.8–1.3)
DEPRECATED RDW RBC AUTO: 13.4 % (ref 12.4–15.4)
EOSINOPHIL # BLD: 0 K/UL (ref 0–0.6)
EOSINOPHIL NFR BLD: 0.2 %
EPI CELLS #/AREA URNS AUTO: 4 /HPF (ref 0–5)
FLUAV RNA RESP QL NAA+PROBE: NOT DETECTED
FLUBV RNA RESP QL NAA+PROBE: NOT DETECTED
GFR SERPLBLD CREATININE-BSD FMLA CKD-EPI: 54 ML/MIN/{1.73_M2}
GLUCOSE SERPL-MCNC: 92 MG/DL (ref 70–99)
GLUCOSE UR STRIP.AUTO-MCNC: NEGATIVE MG/DL
HCT VFR BLD AUTO: 40.1 % (ref 40.5–52.5)
HGB BLD-MCNC: 13.3 G/DL (ref 13.5–17.5)
HGB UR QL STRIP.AUTO: ABNORMAL
HYALINE CASTS #/AREA URNS AUTO: 19 /LPF (ref 0–8)
KETONES UR STRIP.AUTO-MCNC: NEGATIVE MG/DL
LACTATE BLDV-SCNC: 2.4 MMOL/L (ref 0.4–1.9)
LACTATE BLDV-SCNC: 3 MMOL/L (ref 0.4–1.9)
LEUKOCYTE ESTERASE UR QL STRIP.AUTO: ABNORMAL
LYMPHOCYTES # BLD: 1.1 K/UL (ref 1–5.1)
LYMPHOCYTES NFR BLD: 9.6 %
MCH RBC QN AUTO: 31 PG (ref 26–34)
MCHC RBC AUTO-ENTMCNC: 33.2 G/DL (ref 31–36)
MCV RBC AUTO: 93.6 FL (ref 80–100)
MONOCYTES # BLD: 0.1 K/UL (ref 0–1.3)
MONOCYTES NFR BLD: 0.7 %
NEUTROPHILS # BLD: 9.9 K/UL (ref 1.7–7.7)
NEUTROPHILS NFR BLD: 89.2 %
NITRITE UR QL STRIP.AUTO: POSITIVE
PH UR STRIP.AUTO: 5.5 [PH] (ref 5–8)
PLATELET # BLD AUTO: 250 K/UL (ref 135–450)
PMV BLD AUTO: 9.4 FL (ref 5–10.5)
POTASSIUM SERPL-SCNC: 4.8 MMOL/L (ref 3.5–5.1)
PROCALCITONIN SERPL IA-MCNC: 0.59 NG/ML (ref 0–0.15)
PROT SERPL-MCNC: 7.8 G/DL (ref 6.4–8.2)
PROT UR STRIP.AUTO-MCNC: 300 MG/DL
RBC # BLD AUTO: 4.28 M/UL (ref 4.2–5.9)
RBC CLUMPS #/AREA URNS AUTO: 153 /HPF (ref 0–4)
SARS-COV-2 RNA RESP QL NAA+PROBE: NOT DETECTED
SODIUM SERPL-SCNC: 139 MMOL/L (ref 136–145)
SP GR UR STRIP.AUTO: 1.01 (ref 1–1.03)
UA COMPLETE W REFLEX CULTURE PNL UR: YES
UA DIPSTICK W REFLEX MICRO PNL UR: YES
URN SPEC COLLECT METH UR: ABNORMAL
UROBILINOGEN UR STRIP-ACNC: 1 E.U./DL
WBC # BLD AUTO: 11.1 K/UL (ref 4–11)
WBC #/AREA URNS AUTO: 3346 /HPF (ref 0–5)

## 2024-08-14 PROCEDURE — 84145 PROCALCITONIN (PCT): CPT

## 2024-08-14 PROCEDURE — 74177 CT ABD & PELVIS W/CONTRAST: CPT

## 2024-08-14 PROCEDURE — 87186 SC STD MICRODIL/AGAR DIL: CPT

## 2024-08-14 PROCEDURE — 6360000002 HC RX W HCPCS: Performed by: INTERNAL MEDICINE

## 2024-08-14 PROCEDURE — 96374 THER/PROPH/DIAG INJ IV PUSH: CPT

## 2024-08-14 PROCEDURE — 6360000004 HC RX CONTRAST MEDICATION: Performed by: STUDENT IN AN ORGANIZED HEALTH CARE EDUCATION/TRAINING PROGRAM

## 2024-08-14 PROCEDURE — 2580000003 HC RX 258: Performed by: STUDENT IN AN ORGANIZED HEALTH CARE EDUCATION/TRAINING PROGRAM

## 2024-08-14 PROCEDURE — 93005 ELECTROCARDIOGRAM TRACING: CPT | Performed by: STUDENT IN AN ORGANIZED HEALTH CARE EDUCATION/TRAINING PROGRAM

## 2024-08-14 PROCEDURE — 87086 URINE CULTURE/COLONY COUNT: CPT

## 2024-08-14 PROCEDURE — 80053 COMPREHEN METABOLIC PANEL: CPT

## 2024-08-14 PROCEDURE — 87077 CULTURE AEROBIC IDENTIFY: CPT

## 2024-08-14 PROCEDURE — 83605 ASSAY OF LACTIC ACID: CPT

## 2024-08-14 PROCEDURE — 87150 DNA/RNA AMPLIFIED PROBE: CPT

## 2024-08-14 PROCEDURE — 87636 SARSCOV2 & INF A&B AMP PRB: CPT

## 2024-08-14 PROCEDURE — 96361 HYDRATE IV INFUSION ADD-ON: CPT

## 2024-08-14 PROCEDURE — 1200000000 HC SEMI PRIVATE

## 2024-08-14 PROCEDURE — 6360000002 HC RX W HCPCS: Performed by: STUDENT IN AN ORGANIZED HEALTH CARE EDUCATION/TRAINING PROGRAM

## 2024-08-14 PROCEDURE — 6370000000 HC RX 637 (ALT 250 FOR IP): Performed by: INTERNAL MEDICINE

## 2024-08-14 PROCEDURE — 71045 X-RAY EXAM CHEST 1 VIEW: CPT

## 2024-08-14 PROCEDURE — 81001 URINALYSIS AUTO W/SCOPE: CPT

## 2024-08-14 PROCEDURE — 2580000003 HC RX 258: Performed by: INTERNAL MEDICINE

## 2024-08-14 PROCEDURE — 99285 EMERGENCY DEPT VISIT HI MDM: CPT

## 2024-08-14 PROCEDURE — 87040 BLOOD CULTURE FOR BACTERIA: CPT

## 2024-08-14 PROCEDURE — 36415 COLL VENOUS BLD VENIPUNCTURE: CPT

## 2024-08-14 PROCEDURE — 85025 COMPLETE CBC W/AUTO DIFF WBC: CPT

## 2024-08-14 RX ORDER — ENOXAPARIN SODIUM 100 MG/ML
40 INJECTION SUBCUTANEOUS DAILY
Status: DISCONTINUED | OUTPATIENT
Start: 2024-08-14 | End: 2024-08-19 | Stop reason: HOSPADM

## 2024-08-14 RX ORDER — ASPIRIN 81 MG/1
81 TABLET, CHEWABLE ORAL DAILY
Status: DISCONTINUED | OUTPATIENT
Start: 2024-08-15 | End: 2024-08-19 | Stop reason: HOSPADM

## 2024-08-14 RX ORDER — 0.9 % SODIUM CHLORIDE 0.9 %
125 INTRAVENOUS SOLUTION INTRAVENOUS ONCE
Status: COMPLETED | OUTPATIENT
Start: 2024-08-14 | End: 2024-08-14

## 2024-08-14 RX ORDER — ATORVASTATIN CALCIUM 80 MG/1
80 TABLET, FILM COATED ORAL NIGHTLY
Status: DISCONTINUED | OUTPATIENT
Start: 2024-08-14 | End: 2024-08-19 | Stop reason: HOSPADM

## 2024-08-14 RX ORDER — SODIUM CHLORIDE, SODIUM LACTATE, POTASSIUM CHLORIDE, CALCIUM CHLORIDE 600; 310; 30; 20 MG/100ML; MG/100ML; MG/100ML; MG/100ML
INJECTION, SOLUTION INTRAVENOUS CONTINUOUS
Status: DISCONTINUED | OUTPATIENT
Start: 2024-08-14 | End: 2024-08-14 | Stop reason: HOSPADM

## 2024-08-14 RX ORDER — ONDANSETRON 4 MG/1
4 TABLET, ORALLY DISINTEGRATING ORAL EVERY 8 HOURS PRN
Status: DISCONTINUED | OUTPATIENT
Start: 2024-08-14 | End: 2024-08-19 | Stop reason: HOSPADM

## 2024-08-14 RX ORDER — GUAIFENESIN 200 MG/10ML
200 LIQUID ORAL EVERY 4 HOURS PRN
Status: DISCONTINUED | OUTPATIENT
Start: 2024-08-14 | End: 2024-08-19 | Stop reason: HOSPADM

## 2024-08-14 RX ORDER — TIMOLOL MALEATE 5 MG/ML
1 SOLUTION/ DROPS OPHTHALMIC 2 TIMES DAILY
Status: DISCONTINUED | OUTPATIENT
Start: 2024-08-14 | End: 2024-08-19 | Stop reason: HOSPADM

## 2024-08-14 RX ORDER — SODIUM CHLORIDE, SODIUM LACTATE, POTASSIUM CHLORIDE, AND CALCIUM CHLORIDE .6; .31; .03; .02 G/100ML; G/100ML; G/100ML; G/100ML
30 INJECTION, SOLUTION INTRAVENOUS ONCE
Status: COMPLETED | OUTPATIENT
Start: 2024-08-14 | End: 2024-08-14

## 2024-08-14 RX ORDER — 0.9 % SODIUM CHLORIDE 0.9 %
125 INTRAVENOUS SOLUTION INTRAVENOUS ONCE
Status: DISCONTINUED | OUTPATIENT
Start: 2024-08-14 | End: 2024-08-19 | Stop reason: HOSPADM

## 2024-08-14 RX ORDER — BRIMONIDINE TARTRATE 2 MG/ML
1 SOLUTION/ DROPS OPHTHALMIC 2 TIMES DAILY
Status: DISCONTINUED | OUTPATIENT
Start: 2024-08-14 | End: 2024-08-19 | Stop reason: HOSPADM

## 2024-08-14 RX ORDER — ACETAMINOPHEN 325 MG/1
650 TABLET ORAL EVERY 4 HOURS PRN
Status: DISCONTINUED | OUTPATIENT
Start: 2024-08-14 | End: 2024-08-19 | Stop reason: HOSPADM

## 2024-08-14 RX ORDER — ENOXAPARIN SODIUM 100 MG/ML
30 INJECTION SUBCUTANEOUS DAILY
Status: DISCONTINUED | OUTPATIENT
Start: 2024-08-14 | End: 2024-08-14 | Stop reason: SDUPTHER

## 2024-08-14 RX ORDER — LISINOPRIL 20 MG/1
20 TABLET ORAL DAILY
Status: DISCONTINUED | OUTPATIENT
Start: 2024-08-15 | End: 2024-08-19 | Stop reason: HOSPADM

## 2024-08-14 RX ORDER — ACETAMINOPHEN 325 MG/1
650 TABLET ORAL EVERY 4 HOURS PRN
Status: DISCONTINUED | OUTPATIENT
Start: 2024-08-14 | End: 2024-08-15

## 2024-08-14 RX ORDER — MIRTAZAPINE 15 MG/1
7.5 TABLET, FILM COATED ORAL NIGHTLY
Status: DISCONTINUED | OUTPATIENT
Start: 2024-08-14 | End: 2024-08-19 | Stop reason: HOSPADM

## 2024-08-14 RX ORDER — CARBOXYMETHYLCELLULOSE SODIUM 10 MG/ML
1 GEL OPHTHALMIC 3 TIMES DAILY PRN
Status: DISCONTINUED | OUTPATIENT
Start: 2024-08-14 | End: 2024-08-19 | Stop reason: HOSPADM

## 2024-08-14 RX ORDER — ACETAMINOPHEN 325 MG/1
650 TABLET ORAL 3 TIMES DAILY
Status: DISCONTINUED | OUTPATIENT
Start: 2024-08-14 | End: 2024-08-19 | Stop reason: HOSPADM

## 2024-08-14 RX ORDER — DORZOLAMIDE HCL 20 MG/ML
1 SOLUTION/ DROPS OPHTHALMIC 2 TIMES DAILY
Status: DISCONTINUED | OUTPATIENT
Start: 2024-08-14 | End: 2024-08-19 | Stop reason: HOSPADM

## 2024-08-14 RX ORDER — ATENOLOL 50 MG/1
100 TABLET ORAL DAILY
Status: DISCONTINUED | OUTPATIENT
Start: 2024-08-15 | End: 2024-08-19 | Stop reason: HOSPADM

## 2024-08-14 RX ORDER — SENNA AND DOCUSATE SODIUM 50; 8.6 MG/1; MG/1
2 TABLET, FILM COATED ORAL NIGHTLY
Status: DISCONTINUED | OUTPATIENT
Start: 2024-08-14 | End: 2024-08-19 | Stop reason: HOSPADM

## 2024-08-14 RX ORDER — AMOXICILLIN AND CLAVULANATE POTASSIUM 875; 125 MG/1; MG/1
1 TABLET, FILM COATED ORAL EVERY 12 HOURS SCHEDULED
Status: DISCONTINUED | OUTPATIENT
Start: 2024-08-14 | End: 2024-08-14 | Stop reason: ALTCHOICE

## 2024-08-14 RX ORDER — LATANOPROST 50 UG/ML
1 SOLUTION/ DROPS OPHTHALMIC NIGHTLY
Status: DISCONTINUED | OUTPATIENT
Start: 2024-08-14 | End: 2024-08-19 | Stop reason: HOSPADM

## 2024-08-14 RX ORDER — AMLODIPINE BESYLATE 5 MG/1
5 TABLET ORAL DAILY
Status: DISCONTINUED | OUTPATIENT
Start: 2024-08-15 | End: 2024-08-19

## 2024-08-14 RX ORDER — DORZOLAMIDE HYDROCHLORIDE AND TIMOLOL MALEATE 20; 5 MG/ML; MG/ML
1 SOLUTION/ DROPS OPHTHALMIC 2 TIMES DAILY
Status: DISCONTINUED | OUTPATIENT
Start: 2024-08-14 | End: 2024-08-14 | Stop reason: SDUPTHER

## 2024-08-14 RX ORDER — ALBUTEROL SULFATE 90 UG/1
2 AEROSOL, METERED RESPIRATORY (INHALATION) EVERY 4 HOURS PRN
Status: DISCONTINUED | OUTPATIENT
Start: 2024-08-14 | End: 2024-08-19 | Stop reason: HOSPADM

## 2024-08-14 RX ORDER — SIMETHICONE 80 MG
80 TABLET,CHEWABLE ORAL EVERY 8 HOURS PRN
Status: DISCONTINUED | OUTPATIENT
Start: 2024-08-14 | End: 2024-08-19 | Stop reason: HOSPADM

## 2024-08-14 RX ADMIN — MIRTAZAPINE 7.5 MG: 15 TABLET, FILM COATED ORAL at 20:07

## 2024-08-14 RX ADMIN — TIMOLOL MALEATE 1 DROP: 5 SOLUTION OPHTHALMIC at 21:17

## 2024-08-14 RX ADMIN — WATER 1000 MG: 1 INJECTION INTRAMUSCULAR; INTRAVENOUS; SUBCUTANEOUS at 13:40

## 2024-08-14 RX ADMIN — SODIUM CHLORIDE, POTASSIUM CHLORIDE, SODIUM LACTATE AND CALCIUM CHLORIDE 2244 ML: 600; 310; 30; 20 INJECTION, SOLUTION INTRAVENOUS at 13:27

## 2024-08-14 RX ADMIN — DORZOLAMIDE HCL 1 DROP: 20 SOLUTION/ DROPS OPHTHALMIC at 21:16

## 2024-08-14 RX ADMIN — SODIUM CHLORIDE, POTASSIUM CHLORIDE, SODIUM LACTATE AND CALCIUM CHLORIDE: 600; 310; 30; 20 INJECTION, SOLUTION INTRAVENOUS at 15:50

## 2024-08-14 RX ADMIN — ACETAMINOPHEN 650 MG: 325 TABLET ORAL at 20:05

## 2024-08-14 RX ADMIN — ATORVASTATIN CALCIUM 80 MG: 80 TABLET, FILM COATED ORAL at 20:05

## 2024-08-14 RX ADMIN — IOPAMIDOL 75 ML: 755 INJECTION, SOLUTION INTRAVENOUS at 13:48

## 2024-08-14 RX ADMIN — SENNOSIDES AND DOCUSATE SODIUM 2 TABLET: 50; 8.6 TABLET ORAL at 20:07

## 2024-08-14 RX ADMIN — SODIUM CHLORIDE 125 ML: 9 INJECTION, SOLUTION INTRAVENOUS at 20:05

## 2024-08-14 RX ADMIN — LATANOPROST 1 DROP: 50 SOLUTION OPHTHALMIC at 21:17

## 2024-08-14 RX ADMIN — ENOXAPARIN SODIUM 40 MG: 100 INJECTION SUBCUTANEOUS at 20:09

## 2024-08-14 RX ADMIN — BRIMONIDINE TARTRATE 1 DROP: 2 SOLUTION OPHTHALMIC at 21:17

## 2024-08-14 ASSESSMENT — PAIN SCALES - GENERAL
PAINLEVEL_OUTOF10: 5
PAINLEVEL_OUTOF10: 0

## 2024-08-14 ASSESSMENT — PAIN - FUNCTIONAL ASSESSMENT: PAIN_FUNCTIONAL_ASSESSMENT: NONE - DENIES PAIN

## 2024-08-14 ASSESSMENT — PAIN DESCRIPTION - LOCATION: LOCATION: GENERALIZED

## 2024-08-14 NOTE — ED NOTES
How does patient ambulate?   []Low Fall Risk (ambulates by themselves without support)  []Stand by assist   []Contact Guard   []Front wheel walker  []Wheelchair   []Steady  [x]Bed bound  []History of Lower Extremity Amputation  []Unknown, did not assess in the emergency department   How does patient take pills?  []Whole with Water  []Crushed in applesauce  []Crushed in pudding  []Other  [x]Unknown no oral medications were given in the ED  Is patient alert?   [x]Alert x1  []Drowsy but responds to voice  []Doesn't respond to voice but responds to painful stimuli  []Unresponsive  Is patient oriented?   [x]To person  []To place  []To time  []To situation  []Confused  []Agitated  []Follows commands  If patient is disoriented or from a Skill Nursing Facility has family been notified of admission?   [x]Yes   []No  Patient belongings?   []Cell phone  []Wallet   []Dentures  []Clothing  Any specific patient or family belongings/needs/dynamics?     Miscellaneous comments/pending orders?   Stephanie placed in ED for strict I and O monitoring   If there are any additional questions please reach out to the Emergency Department.

## 2024-08-14 NOTE — ED PROVIDER NOTES
EMERGENCY DEPARTMENT PROVIDER NOTE      PATIENT IDENTIFICATION  Name:   Jelani Martínez  MRN:   [id number]  YOB: 1940  Date of Evaluation:   8/14/2024  Provider:   Miguel Lopez DO  PCP:   Ignacio Cortez MD        CHIEF COMPLAINT:   Fever (Patient is coming from home of [facility] previously seen for UTI and treated 2-3 weeks ago. At nursing home temp was 102F. Patient at baseline has dementia and is alert to  Nursing home gave patient 450mg of tylenol )      HPI  Jelani Martínez is a(n) 83 y.o. male with past medical history as below including UTI sepsis and dementia  who arrives via EMS for fever.  Patient was noted to be febrile by his nurse facility.  History limited due to patient inability to communicate given baseline dementia.  But he is complaining of some lower abdominal pain.  Patient reportedly received Tylenol from [facility] facility prior to transfer here.      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, stage 1 through stage 4 or unspecified chronic kidney disease, without heart failure     Benign prostatic hyperplasia without lower urinary tract symptoms     BPH (benign prostatic hyperplasia)     Carpal tunnel syndrome     Cerebral infarction (HCC)     Chronic kidney disease, stage 2 (mild)     Cognitive communication deficit     Constipation     COVID-19 12/30/2021    Diverticulosis     Dysuria     Emphysema, unspecified (HCC)     Hyperlipidemia     Hypertension     Hypokalemia     Major depression     Muscle weakness     Neuralgia and neuritis     Neuralgia and neuritis     Nicotine dependence     Peripheral vascular disease (HCC)     Polyneuropathy     Polyosteoarthritis     Primary localized osteoarthritis     Radiculopathy     TIA (transient ischemic

## 2024-08-14 NOTE — ED NOTES
Pharmacy Home Medication Reconciliation Note    A medication reconciliation has been completed for Jelani Martínez 1940    Information provided by: facility list (Home at [facility]), facility RN    The patient's home medication list is as follows:  No current facility-administered medications on file prior to encounter.     Current Outpatient Medications on File Prior to Encounter   Medication Sig Dispense Refill    amLODIPine (NORVASC) 5 MG tablet Take 1 tablet by mouth daily 30 tablet 3    senna-docusate (PERICOLACE) 8.6-50 MG per tablet Take 2 tablets by mouth at bedtime      simethicone (MYLICON) 180 MG capsule Take 1 capsule by mouth every 8 hours as needed for Flatulence      atenolol (TENORMIN) 100 MG tablet Take 1 tablet by mouth daily 30 tablet 0    brimonidine (ALPHAGAN) 0.2 % ophthalmic solution Place 1 drop into both eyes 2 times daily      dorzolamide-timolol (COSOPT) 2-0.5 % ophthalmic solution Place 1 drop into both eyes 2 times daily      guaiFENesin (ROBITUSSIN) 100 MG/5ML liquid Take 10 mLs by mouth every 4 hours as needed for Cough      latanoprost (XALATAN) 0.005 % ophthalmic solution Place 1 drop into both eyes nightly      mirtazapine (REMERON) 7.5 MG tablet Take 1 tablet by mouth nightly      dextran 70-hypromellose (NATURAL BALANCE TEARS) 0.1-0.3 % SOLN opthalmic solution Place 2 drops into both eyes 3 times daily      ondansetron (ZOFRAN) 4 MG tablet Take 1 tablet by mouth every 8 hours as needed for Nausea or Vomiting      atorvastatin (LIPITOR) 80 MG tablet Take 1 tablet by mouth at bedtime      lisinopril (PRINIVIL;ZESTRIL) 20 MG tablet Take 1 tablet by mouth daily      albuterol sulfate HFA (PROVENTIL;VENTOLIN;PROAIR) 108 (90 Base) MCG/ACT inhaler Inhale 2 puffs into the lungs every 4 hours as needed for Wheezing      aspirin 81 MG chewable tablet Take 1 tablet by mouth daily      acetaminophen (TYLENOL) 325 MG tablet Take 2 tablets by mouth in the morning, at noon, and at

## 2024-08-15 LAB
ANION GAP SERPL CALCULATED.3IONS-SCNC: 9 MMOL/L (ref 3–16)
BACTERIA UR CULT: NORMAL
BUN SERPL-MCNC: 25 MG/DL (ref 7–20)
CALCIUM SERPL-MCNC: 8.6 MG/DL (ref 8.3–10.6)
CHLORIDE SERPL-SCNC: 106 MMOL/L (ref 99–110)
CO2 SERPL-SCNC: 25 MMOL/L (ref 21–32)
CREAT SERPL-MCNC: 1.4 MG/DL (ref 0.8–1.3)
DEPRECATED RDW RBC AUTO: 13.3 % (ref 12.4–15.4)
EKG ATRIAL RATE: 113 BPM
EKG DIAGNOSIS: NORMAL
EKG P AXIS: 79 DEGREES
EKG P-R INTERVAL: 158 MS
EKG Q-T INTERVAL: 350 MS
EKG QRS DURATION: 120 MS
EKG QTC CALCULATION (BAZETT): 480 MS
EKG R AXIS: 20 DEGREES
EKG T AXIS: 27 DEGREES
EKG VENTRICULAR RATE: 113 BPM
GFR SERPLBLD CREATININE-BSD FMLA CKD-EPI: 49 ML/MIN/{1.73_M2}
GLUCOSE SERPL-MCNC: 88 MG/DL (ref 70–99)
HCT VFR BLD AUTO: 33 % (ref 40.5–52.5)
HGB BLD-MCNC: 10.9 G/DL (ref 13.5–17.5)
MCH RBC QN AUTO: 30.8 PG (ref 26–34)
MCHC RBC AUTO-ENTMCNC: 33 G/DL (ref 31–36)
MCV RBC AUTO: 93.2 FL (ref 80–100)
PLATELET # BLD AUTO: 220 K/UL (ref 135–450)
PMV BLD AUTO: 9.7 FL (ref 5–10.5)
POTASSIUM SERPL-SCNC: 4.1 MMOL/L (ref 3.5–5.1)
RBC # BLD AUTO: 3.54 M/UL (ref 4.2–5.9)
REPORT: NORMAL
SODIUM SERPL-SCNC: 140 MMOL/L (ref 136–145)
WBC # BLD AUTO: 24.6 K/UL (ref 4–11)

## 2024-08-15 PROCEDURE — 85027 COMPLETE CBC AUTOMATED: CPT

## 2024-08-15 PROCEDURE — 80048 BASIC METABOLIC PNL TOTAL CA: CPT

## 2024-08-15 PROCEDURE — 1200000000 HC SEMI PRIVATE

## 2024-08-15 PROCEDURE — 6360000002 HC RX W HCPCS: Performed by: INTERNAL MEDICINE

## 2024-08-15 PROCEDURE — 2580000003 HC RX 258: Performed by: INTERNAL MEDICINE

## 2024-08-15 PROCEDURE — 92526 ORAL FUNCTION THERAPY: CPT

## 2024-08-15 PROCEDURE — 92610 EVALUATE SWALLOWING FUNCTION: CPT

## 2024-08-15 PROCEDURE — 93010 ELECTROCARDIOGRAM REPORT: CPT | Performed by: INTERNAL MEDICINE

## 2024-08-15 PROCEDURE — 36415 COLL VENOUS BLD VENIPUNCTURE: CPT

## 2024-08-15 PROCEDURE — 6370000000 HC RX 637 (ALT 250 FOR IP): Performed by: INTERNAL MEDICINE

## 2024-08-15 RX ORDER — POTASSIUM CHLORIDE 20 MEQ/1
40 TABLET, EXTENDED RELEASE ORAL PRN
Status: DISCONTINUED | OUTPATIENT
Start: 2024-08-15 | End: 2024-08-19 | Stop reason: HOSPADM

## 2024-08-15 RX ORDER — SODIUM CHLORIDE 9 MG/ML
INJECTION, SOLUTION INTRAVENOUS CONTINUOUS
Status: DISCONTINUED | OUTPATIENT
Start: 2024-08-15 | End: 2024-08-19

## 2024-08-15 RX ORDER — 0.9 % SODIUM CHLORIDE 0.9 %
500 INTRAVENOUS SOLUTION INTRAVENOUS PRN
Status: DISCONTINUED | OUTPATIENT
Start: 2024-08-15 | End: 2024-08-19 | Stop reason: HOSPADM

## 2024-08-15 RX ORDER — POTASSIUM CHLORIDE 7.45 MG/ML
10 INJECTION INTRAVENOUS PRN
Status: DISCONTINUED | OUTPATIENT
Start: 2024-08-15 | End: 2024-08-19 | Stop reason: HOSPADM

## 2024-08-15 RX ORDER — HYDRALAZINE HYDROCHLORIDE 20 MG/ML
10 INJECTION INTRAMUSCULAR; INTRAVENOUS EVERY 6 HOURS PRN
Status: DISCONTINUED | OUTPATIENT
Start: 2024-08-15 | End: 2024-08-19 | Stop reason: HOSPADM

## 2024-08-15 RX ADMIN — LATANOPROST 1 DROP: 50 SOLUTION OPHTHALMIC at 22:30

## 2024-08-15 RX ADMIN — DORZOLAMIDE HCL 1 DROP: 20 SOLUTION/ DROPS OPHTHALMIC at 08:59

## 2024-08-15 RX ADMIN — SENNOSIDES AND DOCUSATE SODIUM 2 TABLET: 50; 8.6 TABLET ORAL at 22:29

## 2024-08-15 RX ADMIN — ASPIRIN 81 MG: 81 TABLET, CHEWABLE ORAL at 08:52

## 2024-08-15 RX ADMIN — DORZOLAMIDE HCL 1 DROP: 20 SOLUTION/ DROPS OPHTHALMIC at 22:30

## 2024-08-15 RX ADMIN — MIRTAZAPINE 7.5 MG: 15 TABLET, FILM COATED ORAL at 22:29

## 2024-08-15 RX ADMIN — SODIUM CHLORIDE: 9 INJECTION, SOLUTION INTRAVENOUS at 08:57

## 2024-08-15 RX ADMIN — ATORVASTATIN CALCIUM 80 MG: 80 TABLET, FILM COATED ORAL at 22:29

## 2024-08-15 RX ADMIN — AMLODIPINE BESYLATE 5 MG: 5 TABLET ORAL at 08:53

## 2024-08-15 RX ADMIN — TIMOLOL MALEATE 1 DROP: 5 SOLUTION OPHTHALMIC at 08:58

## 2024-08-15 RX ADMIN — PIPERACILLIN AND TAZOBACTAM 3375 MG: 3; .375 INJECTION, POWDER, LYOPHILIZED, FOR SOLUTION INTRAVENOUS at 09:14

## 2024-08-15 RX ADMIN — PIPERACILLIN AND TAZOBACTAM 3375 MG: 3; .375 INJECTION, POWDER, LYOPHILIZED, FOR SOLUTION INTRAVENOUS at 16:21

## 2024-08-15 RX ADMIN — ACETAMINOPHEN 650 MG: 325 TABLET ORAL at 16:21

## 2024-08-15 RX ADMIN — ACETAMINOPHEN 650 MG: 325 TABLET ORAL at 08:52

## 2024-08-15 RX ADMIN — LISINOPRIL 20 MG: 20 TABLET ORAL at 08:52

## 2024-08-15 RX ADMIN — ENOXAPARIN SODIUM 40 MG: 100 INJECTION SUBCUTANEOUS at 08:52

## 2024-08-15 RX ADMIN — BRIMONIDINE TARTRATE 1 DROP: 2 SOLUTION OPHTHALMIC at 22:30

## 2024-08-15 RX ADMIN — ACETAMINOPHEN 650 MG: 325 TABLET ORAL at 22:29

## 2024-08-15 RX ADMIN — ATENOLOL 100 MG: 50 TABLET ORAL at 08:52

## 2024-08-15 RX ADMIN — BRIMONIDINE TARTRATE 1 DROP: 2 SOLUTION OPHTHALMIC at 08:58

## 2024-08-15 RX ADMIN — ACETAMINOPHEN 650 MG: 325 TABLET ORAL at 03:55

## 2024-08-15 RX ADMIN — TIMOLOL MALEATE 1 DROP: 5 SOLUTION OPHTHALMIC at 22:30

## 2024-08-15 NOTE — RT PROTOCOL NOTE
RT Inhaler-Nebulizer Bronchodilator Protocol Note    There is a bronchodilator order in the chart from a provider indicating to follow the RT Bronchodilator Protocol and there is an “Initiate RT Inhaler-Nebulizer Bronchodilator Protocol” order as well (see protocol at bottom of note).    CXR Findings:  XR CHEST PORTABLE    Result Date: 8/14/2024  Mild developing atelectasis or infiltrate in left lung base with a small left effusion.  Follow up to resolution is suggested. Stable left upper lung nodule.       The findings from the last RT Protocol Assessment were as follows:   History Pulmonary Disease: None or smoker <15 pack years  Respiratory Pattern: Regular pattern and RR 12-20 bpm  Breath Sounds: Slightly diminished and/or crackles  Cough: Strong, spontaneous, non-productive  Indication for Bronchodilator Therapy: On home bronchodilators  Bronchodilator Assessment Score: 2    Aerosolized bronchodilator medication orders have been revised according to the RT Inhaler-Nebulizer Bronchodilator Protocol below.    Respiratory Therapist to perform RT Therapy Protocol Assessment initially then follow the protocol.  Repeat RT Therapy Protocol Assessment PRN for score 0-3 or on second treatment, BID, and PRN for scores above 3.    No Indications - adjust the frequency to every 6 hours PRN wheezing or bronchospasm, if no treatments needed after 48 hours then discontinue using Per Protocol order mode.     If indication present, adjust the RT bronchodilator orders based on the Bronchodilator Assessment Score as indicated below.  Use Inhaler orders unless patient has one or more of the following: on home nebulizer, not able to hold breath for 10 seconds, is not alert and oriented, cannot activate and use MDI correctly, or respiratory rate 25 breaths per minute or more, then use the equivalent nebulizer order(s) with same Frequency and PRN reasons based on the score.  If a patient is on this medication at home then do not

## 2024-08-15 NOTE — PLAN OF CARE
Problem: Pain  Goal: Verbalizes/displays adequate comfort level or baseline comfort level  Outcome: Not Progressing     Problem: Safety - Adult  Goal: Free from fall injury  Outcome: Progressing     Problem: Skin/Tissue Integrity  Goal: Absence of new skin breakdown  Description: 1.  Monitor for areas of redness and/or skin breakdown  2.  Assess vascular access sites hourly  3.  Every 4-6 hours minimum:  Change oxygen saturation probe site  4.  Every 4-6 hours:  If on nasal continuous positive airway pressure, respiratory therapy assess nares and determine need for appliance change or resting period.  Outcome: Progressing     Problem: Pain  Goal: Verbalizes/displays adequate comfort level or baseline comfort level  Outcome: Not Progressing

## 2024-08-15 NOTE — H&P
[place] [address]                           HISTORY & PHYSICAL      PATIENT NAME: AURORA FERNANDEZ             : 1940  MED REC NO: [id number]                      ROOM: [address] [place] [address]  ACCOUNT NO: [id number]                       ADMIT DATE: 2024  PROVIDER: Jomar Barrera MD      HISTORY OF PRESENT ILLNESS:  The patient is an 83-year-old black American gentleman, came to the emergency room with history of fever.  The patient is a nursing home resident.  He resides at home at [facility], was seen for UTI and treated 2-3 weeks ago at [facility].  The temperature went as high as 102 degrees Fahrenheit.  The patient has moderate dementia, but he remains pleasant.  The patient was given Tylenol at the nursing home and sent in for further evaluation.  He has considerable dementia.  There is no nausea, vomiting.  No angina pectoris.  Does have exertional shortness of breath.    PAST MEDICAL HISTORY:  Pertinent for dementia, prostatitis, atherosclerotic heart disease, hypertensive heart disease, chronic kidney disease, BPH, carpal tunnel syndrome, CVA, cognitive communication deficit, constipation, COVID-19, diverticulosis, dysuria, emphysema, hyperlipidemia, hypertension, hypokalemia, major depression, muscle weakness, neuralgia, nicotine dependence, peripheral arterial disease, polyneuropathy, polyarthritis, localized osteoarthritis, vascular dementia, urge incontinence, TIA.    PAST SURGICAL HISTORY:  Pertinent for tonsillectomy and partial pneumonectomy.    FAMILY HISTORY:  Both the parents are  because of natural causes.  No further history available.    SOCIAL HISTORY:  Patient is  from his wife.  He has 4 children.  He quit smoking 2 years ago.  No history of alcohol usage of any significance.  He used to work as a .  There is no history of substance abuse.    REVIEW OF SYSTEMS:  Negative for loss

## 2024-08-16 LAB
ANION GAP SERPL CALCULATED.3IONS-SCNC: 11 MMOL/L (ref 3–16)
BASOPHILS # BLD: 0.1 K/UL (ref 0–0.2)
BASOPHILS NFR BLD: 0.4 %
BUN SERPL-MCNC: 20 MG/DL (ref 7–20)
CALCIUM SERPL-MCNC: 8.6 MG/DL (ref 8.3–10.6)
CHLORIDE SERPL-SCNC: 106 MMOL/L (ref 99–110)
CO2 SERPL-SCNC: 20 MMOL/L (ref 21–32)
CREAT SERPL-MCNC: 1.5 MG/DL (ref 0.8–1.3)
DEPRECATED RDW RBC AUTO: 13.3 % (ref 12.4–15.4)
EOSINOPHIL # BLD: 0.2 K/UL (ref 0–0.6)
EOSINOPHIL NFR BLD: 1.1 %
GFR SERPLBLD CREATININE-BSD FMLA CKD-EPI: 45 ML/MIN/{1.73_M2}
GLUCOSE SERPL-MCNC: 95 MG/DL (ref 70–99)
HCT VFR BLD AUTO: 35.8 % (ref 40.5–52.5)
HGB BLD-MCNC: 11.8 G/DL (ref 13.5–17.5)
LYMPHOCYTES # BLD: 2.3 K/UL (ref 1–5.1)
LYMPHOCYTES NFR BLD: 13.6 %
MCH RBC QN AUTO: 31.2 PG (ref 26–34)
MCHC RBC AUTO-ENTMCNC: 32.9 G/DL (ref 31–36)
MCV RBC AUTO: 94.7 FL (ref 80–100)
MONOCYTES # BLD: 1.4 K/UL (ref 0–1.3)
MONOCYTES NFR BLD: 7.9 %
NEUTROPHILS # BLD: 13.3 K/UL (ref 1.7–7.7)
NEUTROPHILS NFR BLD: 77 %
PLATELET # BLD AUTO: 199 K/UL (ref 135–450)
PMV BLD AUTO: 9.8 FL (ref 5–10.5)
POTASSIUM SERPL-SCNC: 4.2 MMOL/L (ref 3.5–5.1)
PROCALCITONIN SERPL IA-MCNC: 58.35 NG/ML (ref 0–0.15)
RBC # BLD AUTO: 3.78 M/UL (ref 4.2–5.9)
SODIUM SERPL-SCNC: 137 MMOL/L (ref 136–145)
WBC # BLD AUTO: 17.3 K/UL (ref 4–11)

## 2024-08-16 PROCEDURE — 85025 COMPLETE CBC W/AUTO DIFF WBC: CPT

## 2024-08-16 PROCEDURE — 6360000002 HC RX W HCPCS: Performed by: INTERNAL MEDICINE

## 2024-08-16 PROCEDURE — 36415 COLL VENOUS BLD VENIPUNCTURE: CPT

## 2024-08-16 PROCEDURE — 97161 PT EVAL LOW COMPLEX 20 MIN: CPT

## 2024-08-16 PROCEDURE — 1200000000 HC SEMI PRIVATE

## 2024-08-16 PROCEDURE — 2580000003 HC RX 258: Performed by: INTERNAL MEDICINE

## 2024-08-16 PROCEDURE — 97535 SELF CARE MNGMENT TRAINING: CPT

## 2024-08-16 PROCEDURE — 6370000000 HC RX 637 (ALT 250 FOR IP): Performed by: INTERNAL MEDICINE

## 2024-08-16 PROCEDURE — 84145 PROCALCITONIN (PCT): CPT

## 2024-08-16 PROCEDURE — 80048 BASIC METABOLIC PNL TOTAL CA: CPT

## 2024-08-16 PROCEDURE — 97165 OT EVAL LOW COMPLEX 30 MIN: CPT

## 2024-08-16 PROCEDURE — 97530 THERAPEUTIC ACTIVITIES: CPT

## 2024-08-16 RX ADMIN — PIPERACILLIN AND TAZOBACTAM 3375 MG: 3; .375 INJECTION, POWDER, LYOPHILIZED, FOR SOLUTION INTRAVENOUS at 00:19

## 2024-08-16 RX ADMIN — ATORVASTATIN CALCIUM 80 MG: 80 TABLET, FILM COATED ORAL at 19:34

## 2024-08-16 RX ADMIN — ASPIRIN 81 MG: 81 TABLET, CHEWABLE ORAL at 09:49

## 2024-08-16 RX ADMIN — BRIMONIDINE TARTRATE 1 DROP: 2 SOLUTION OPHTHALMIC at 09:50

## 2024-08-16 RX ADMIN — ACETAMINOPHEN 650 MG: 325 TABLET ORAL at 19:35

## 2024-08-16 RX ADMIN — HYDRALAZINE HYDROCHLORIDE 10 MG: 20 INJECTION INTRAMUSCULAR; INTRAVENOUS at 19:30

## 2024-08-16 RX ADMIN — SENNOSIDES AND DOCUSATE SODIUM 2 TABLET: 50; 8.6 TABLET ORAL at 19:33

## 2024-08-16 RX ADMIN — TIMOLOL MALEATE 1 DROP: 5 SOLUTION OPHTHALMIC at 09:51

## 2024-08-16 RX ADMIN — ACETAMINOPHEN 650 MG: 325 TABLET ORAL at 17:39

## 2024-08-16 RX ADMIN — LATANOPROST 1 DROP: 50 SOLUTION OPHTHALMIC at 19:36

## 2024-08-16 RX ADMIN — MIRTAZAPINE 7.5 MG: 15 TABLET, FILM COATED ORAL at 19:31

## 2024-08-16 RX ADMIN — PIPERACILLIN AND TAZOBACTAM 3375 MG: 3; .375 INJECTION, POWDER, LYOPHILIZED, FOR SOLUTION INTRAVENOUS at 09:49

## 2024-08-16 RX ADMIN — AMLODIPINE BESYLATE 5 MG: 5 TABLET ORAL at 09:49

## 2024-08-16 RX ADMIN — LISINOPRIL 20 MG: 20 TABLET ORAL at 09:50

## 2024-08-16 RX ADMIN — DORZOLAMIDE HCL 1 DROP: 20 SOLUTION/ DROPS OPHTHALMIC at 09:50

## 2024-08-16 RX ADMIN — PIPERACILLIN AND TAZOBACTAM 3375 MG: 3; .375 INJECTION, POWDER, LYOPHILIZED, FOR SOLUTION INTRAVENOUS at 17:39

## 2024-08-16 RX ADMIN — DORZOLAMIDE HCL 1 DROP: 20 SOLUTION/ DROPS OPHTHALMIC at 19:36

## 2024-08-16 RX ADMIN — ENOXAPARIN SODIUM 40 MG: 100 INJECTION SUBCUTANEOUS at 09:49

## 2024-08-16 RX ADMIN — ACETAMINOPHEN 650 MG: 325 TABLET ORAL at 09:50

## 2024-08-16 RX ADMIN — TIMOLOL MALEATE 1 DROP: 5 SOLUTION OPHTHALMIC at 19:36

## 2024-08-16 RX ADMIN — BRIMONIDINE TARTRATE 1 DROP: 2 SOLUTION OPHTHALMIC at 19:36

## 2024-08-16 RX ADMIN — ATENOLOL 100 MG: 50 TABLET ORAL at 09:50

## 2024-08-16 NOTE — CARE COORDINATION
Case Management Assessment  Initial Evaluation    Date/Time of Evaluation: 8/16/2024 8:34 AM  Assessment Completed by: Shilpa Pierre    If patient is discharged prior to next notation, then this note serves as note for discharge by case management.    Patient Name: Jelani Martínez                   YOB: 1940  Diagnosis: UTI (urinary tract infection) [N39.0]  Acute cystitis without hematuria [N30.00]  Sepsis, due to unspecified organism, unspecified whether acute organ dysfunction present (HCC) [A41.9]                   Date / Time: 8/14/2024 12:45 PM    Patient Admission Status: Inpatient   Readmission Risk (Low < 19, Mod (19-27), High > 27): Readmission Risk Score: 19.9    Current PCP: Ignacio Cortez MD  PCP verified by CM? No    Chart Reviewed: Yes      History Provided by: Medical Record  Patient Orientation: Person    Patient Cognition: Dementia / Early Alzheimer's    Hospitalization in the last 30 days (Readmission):  No    If yes, Readmission Assessment in  Navigator will be completed.    Advance Directives:      Code Status: DNR-CC   Patient's Primary Decision Maker is: Named in Scanned ACP Document    Primary Decision Maker: TanyaCole - Child - [phone]    Secondary Decision Maker: Mamta Deleon - Child - [phone]    Discharge Planning:    Patient expects to discharge to: Long-term care (From Home at [facility])  Plan for transportation at discharge:      Financial    Payor: [facility] MEDICARE / Plan: [facility] MEDICARE COMPLETE / Product Type: *No Product type* /         Current Nursing Home Information:  Patient admitted from:  Home at [address]  [address]  Report: [phone]  Fax: [phone]     Call to melissa Victoria , who confirmed the patient is: Long Term Care, no Precert required for return.      Patient Covid vaccination status:    Internal Administration   First Dose COVID-19, PFIZER PURPLE top, DILUTE for use, (age 12 y+),

## 2024-08-17 LAB
ANION GAP SERPL CALCULATED.3IONS-SCNC: 10 MMOL/L (ref 3–16)
BASOPHILS # BLD: 0 K/UL (ref 0–0.2)
BASOPHILS NFR BLD: 0.3 %
BUN SERPL-MCNC: 13 MG/DL (ref 7–20)
CALCIUM SERPL-MCNC: 8.7 MG/DL (ref 8.3–10.6)
CHLORIDE SERPL-SCNC: 106 MMOL/L (ref 99–110)
CO2 SERPL-SCNC: 21 MMOL/L (ref 21–32)
CREAT SERPL-MCNC: 1.2 MG/DL (ref 0.8–1.3)
DEPRECATED RDW RBC AUTO: 13.4 % (ref 12.4–15.4)
EOSINOPHIL # BLD: 0.1 K/UL (ref 0–0.6)
EOSINOPHIL NFR BLD: 0.5 %
GFR SERPLBLD CREATININE-BSD FMLA CKD-EPI: 59 ML/MIN/{1.73_M2}
GLUCOSE SERPL-MCNC: 90 MG/DL (ref 70–99)
HCT VFR BLD AUTO: 38.9 % (ref 40.5–52.5)
HGB BLD-MCNC: 12.8 G/DL (ref 13.5–17.5)
LACTATE BLDV-SCNC: 1.1 MMOL/L (ref 0.4–2)
LYMPHOCYTES # BLD: 2.3 K/UL (ref 1–5.1)
LYMPHOCYTES NFR BLD: 15 %
MCH RBC QN AUTO: 31.1 PG (ref 26–34)
MCHC RBC AUTO-ENTMCNC: 32.9 G/DL (ref 31–36)
MCV RBC AUTO: 94.4 FL (ref 80–100)
MONOCYTES # BLD: 1.2 K/UL (ref 0–1.3)
MONOCYTES NFR BLD: 7.9 %
NEUTROPHILS # BLD: 11.9 K/UL (ref 1.7–7.7)
NEUTROPHILS NFR BLD: 76.3 %
PLATELET # BLD AUTO: 232 K/UL (ref 135–450)
PMV BLD AUTO: 9.3 FL (ref 5–10.5)
POTASSIUM SERPL-SCNC: 4 MMOL/L (ref 3.5–5.1)
PROCALCITONIN SERPL IA-MCNC: 28.45 NG/ML (ref 0–0.15)
RBC # BLD AUTO: 4.12 M/UL (ref 4.2–5.9)
SODIUM SERPL-SCNC: 137 MMOL/L (ref 136–145)
WBC # BLD AUTO: 15.6 K/UL (ref 4–11)

## 2024-08-17 PROCEDURE — 2580000003 HC RX 258: Performed by: INTERNAL MEDICINE

## 2024-08-17 PROCEDURE — 36415 COLL VENOUS BLD VENIPUNCTURE: CPT

## 2024-08-17 PROCEDURE — 84145 PROCALCITONIN (PCT): CPT

## 2024-08-17 PROCEDURE — 83605 ASSAY OF LACTIC ACID: CPT

## 2024-08-17 PROCEDURE — 1200000000 HC SEMI PRIVATE

## 2024-08-17 PROCEDURE — 6360000002 HC RX W HCPCS: Performed by: INTERNAL MEDICINE

## 2024-08-17 PROCEDURE — 80048 BASIC METABOLIC PNL TOTAL CA: CPT

## 2024-08-17 PROCEDURE — 85025 COMPLETE CBC W/AUTO DIFF WBC: CPT

## 2024-08-17 PROCEDURE — 6370000000 HC RX 637 (ALT 250 FOR IP): Performed by: INTERNAL MEDICINE

## 2024-08-17 RX ADMIN — SENNOSIDES AND DOCUSATE SODIUM 2 TABLET: 50; 8.6 TABLET ORAL at 20:34

## 2024-08-17 RX ADMIN — BRIMONIDINE TARTRATE 1 DROP: 2 SOLUTION OPHTHALMIC at 09:14

## 2024-08-17 RX ADMIN — ACETAMINOPHEN 650 MG: 325 TABLET ORAL at 20:34

## 2024-08-17 RX ADMIN — ASPIRIN 81 MG: 81 TABLET, CHEWABLE ORAL at 09:06

## 2024-08-17 RX ADMIN — HYDRALAZINE HYDROCHLORIDE 10 MG: 20 INJECTION INTRAMUSCULAR; INTRAVENOUS at 20:29

## 2024-08-17 RX ADMIN — AMLODIPINE BESYLATE 5 MG: 5 TABLET ORAL at 09:07

## 2024-08-17 RX ADMIN — ATORVASTATIN CALCIUM 80 MG: 80 TABLET, FILM COATED ORAL at 20:34

## 2024-08-17 RX ADMIN — PIPERACILLIN AND TAZOBACTAM 3375 MG: 3; .375 INJECTION, POWDER, LYOPHILIZED, FOR SOLUTION INTRAVENOUS at 09:22

## 2024-08-17 RX ADMIN — ACETAMINOPHEN 650 MG: 325 TABLET ORAL at 14:59

## 2024-08-17 RX ADMIN — MIRTAZAPINE 7.5 MG: 15 TABLET, FILM COATED ORAL at 20:35

## 2024-08-17 RX ADMIN — DORZOLAMIDE HCL 1 DROP: 20 SOLUTION/ DROPS OPHTHALMIC at 20:35

## 2024-08-17 RX ADMIN — ACETAMINOPHEN 650 MG: 325 TABLET ORAL at 09:06

## 2024-08-17 RX ADMIN — DORZOLAMIDE HCL 1 DROP: 20 SOLUTION/ DROPS OPHTHALMIC at 09:14

## 2024-08-17 RX ADMIN — BRIMONIDINE TARTRATE 1 DROP: 2 SOLUTION OPHTHALMIC at 20:35

## 2024-08-17 RX ADMIN — TIMOLOL MALEATE 1 DROP: 5 SOLUTION OPHTHALMIC at 20:35

## 2024-08-17 RX ADMIN — ENOXAPARIN SODIUM 40 MG: 100 INJECTION SUBCUTANEOUS at 09:14

## 2024-08-17 RX ADMIN — SODIUM CHLORIDE: 9 INJECTION, SOLUTION INTRAVENOUS at 09:19

## 2024-08-17 RX ADMIN — PIPERACILLIN AND TAZOBACTAM 3375 MG: 3; .375 INJECTION, POWDER, LYOPHILIZED, FOR SOLUTION INTRAVENOUS at 23:44

## 2024-08-17 RX ADMIN — LISINOPRIL 20 MG: 20 TABLET ORAL at 09:06

## 2024-08-17 RX ADMIN — PIPERACILLIN AND TAZOBACTAM 3375 MG: 3; .375 INJECTION, POWDER, LYOPHILIZED, FOR SOLUTION INTRAVENOUS at 16:14

## 2024-08-17 RX ADMIN — ATENOLOL 100 MG: 50 TABLET ORAL at 09:06

## 2024-08-17 RX ADMIN — TIMOLOL MALEATE 1 DROP: 5 SOLUTION OPHTHALMIC at 09:14

## 2024-08-17 RX ADMIN — LATANOPROST 1 DROP: 50 SOLUTION OPHTHALMIC at 20:35

## 2024-08-17 RX ADMIN — PIPERACILLIN AND TAZOBACTAM 3375 MG: 3; .375 INJECTION, POWDER, LYOPHILIZED, FOR SOLUTION INTRAVENOUS at 01:05

## 2024-08-17 ASSESSMENT — PAIN SCALES - GENERAL
PAINLEVEL_OUTOF10: 0
PAINLEVEL_OUTOF10: 0

## 2024-08-18 LAB
ANION GAP SERPL CALCULATED.3IONS-SCNC: 13 MMOL/L (ref 3–16)
BACTERIA BLD CULT ORG #2: ABNORMAL
BACTERIA BLD CULT ORG #2: ABNORMAL
BASOPHILS # BLD: 0.1 K/UL (ref 0–0.2)
BASOPHILS NFR BLD: 0.5 %
BUN SERPL-MCNC: 13 MG/DL (ref 7–20)
CALCIUM SERPL-MCNC: 8.8 MG/DL (ref 8.3–10.6)
CHLORIDE SERPL-SCNC: 109 MMOL/L (ref 99–110)
CO2 SERPL-SCNC: 18 MMOL/L (ref 21–32)
CREAT SERPL-MCNC: 1.2 MG/DL (ref 0.8–1.3)
DEPRECATED RDW RBC AUTO: 13.4 % (ref 12.4–15.4)
EOSINOPHIL # BLD: 0.2 K/UL (ref 0–0.6)
EOSINOPHIL NFR BLD: 1.7 %
GFR SERPLBLD CREATININE-BSD FMLA CKD-EPI: 59 ML/MIN/{1.73_M2}
GLUCOSE SERPL-MCNC: 95 MG/DL (ref 70–99)
HCT VFR BLD AUTO: 38.6 % (ref 40.5–52.5)
HGB BLD-MCNC: 12.8 G/DL (ref 13.5–17.5)
LYMPHOCYTES # BLD: 2.9 K/UL (ref 1–5.1)
LYMPHOCYTES NFR BLD: 25.2 %
MCH RBC QN AUTO: 31 PG (ref 26–34)
MCHC RBC AUTO-ENTMCNC: 33.1 G/DL (ref 31–36)
MCV RBC AUTO: 93.6 FL (ref 80–100)
MONOCYTES # BLD: 1.1 K/UL (ref 0–1.3)
MONOCYTES NFR BLD: 9.7 %
NEUTROPHILS # BLD: 7.3 K/UL (ref 1.7–7.7)
NEUTROPHILS NFR BLD: 62.9 %
ORGANISM: ABNORMAL
ORGANISM: ABNORMAL
PLATELET # BLD AUTO: 247 K/UL (ref 135–450)
PMV BLD AUTO: 9.5 FL (ref 5–10.5)
POTASSIUM SERPL-SCNC: 3.6 MMOL/L (ref 3.5–5.1)
PROCALCITONIN SERPL IA-MCNC: 13.58 NG/ML (ref 0–0.15)
RBC # BLD AUTO: 4.12 M/UL (ref 4.2–5.9)
SODIUM SERPL-SCNC: 140 MMOL/L (ref 136–145)
WBC # BLD AUTO: 11.5 K/UL (ref 4–11)

## 2024-08-18 PROCEDURE — 85025 COMPLETE CBC W/AUTO DIFF WBC: CPT

## 2024-08-18 PROCEDURE — 6370000000 HC RX 637 (ALT 250 FOR IP): Performed by: NURSE PRACTITIONER

## 2024-08-18 PROCEDURE — 6360000002 HC RX W HCPCS: Performed by: INTERNAL MEDICINE

## 2024-08-18 PROCEDURE — 2580000003 HC RX 258: Performed by: INTERNAL MEDICINE

## 2024-08-18 PROCEDURE — 80048 BASIC METABOLIC PNL TOTAL CA: CPT

## 2024-08-18 PROCEDURE — 84145 PROCALCITONIN (PCT): CPT

## 2024-08-18 PROCEDURE — 36415 COLL VENOUS BLD VENIPUNCTURE: CPT

## 2024-08-18 PROCEDURE — 1200000000 HC SEMI PRIVATE

## 2024-08-18 PROCEDURE — 6370000000 HC RX 637 (ALT 250 FOR IP): Performed by: INTERNAL MEDICINE

## 2024-08-18 RX ORDER — LANOLIN ALCOHOL/MO/W.PET/CERES
9 CREAM (GRAM) TOPICAL NIGHTLY PRN
Status: DISCONTINUED | OUTPATIENT
Start: 2024-08-18 | End: 2024-08-19 | Stop reason: HOSPADM

## 2024-08-18 RX ADMIN — TIMOLOL MALEATE 1 DROP: 5 SOLUTION OPHTHALMIC at 22:06

## 2024-08-18 RX ADMIN — HYDRALAZINE HYDROCHLORIDE 10 MG: 20 INJECTION INTRAMUSCULAR; INTRAVENOUS at 22:12

## 2024-08-18 RX ADMIN — TIMOLOL MALEATE 1 DROP: 5 SOLUTION OPHTHALMIC at 11:48

## 2024-08-18 RX ADMIN — BRIMONIDINE TARTRATE 1 DROP: 2 SOLUTION OPHTHALMIC at 11:48

## 2024-08-18 RX ADMIN — LATANOPROST 1 DROP: 50 SOLUTION OPHTHALMIC at 22:07

## 2024-08-18 RX ADMIN — SENNOSIDES AND DOCUSATE SODIUM 2 TABLET: 50; 8.6 TABLET ORAL at 22:05

## 2024-08-18 RX ADMIN — HYDRALAZINE HYDROCHLORIDE 10 MG: 20 INJECTION INTRAMUSCULAR; INTRAVENOUS at 05:19

## 2024-08-18 RX ADMIN — ACETAMINOPHEN 650 MG: 325 TABLET ORAL at 16:36

## 2024-08-18 RX ADMIN — PIPERACILLIN AND TAZOBACTAM 3375 MG: 3; .375 INJECTION, POWDER, LYOPHILIZED, FOR SOLUTION INTRAVENOUS at 07:54

## 2024-08-18 RX ADMIN — ACETAMINOPHEN 650 MG: 325 TABLET ORAL at 09:33

## 2024-08-18 RX ADMIN — SODIUM CHLORIDE: 9 INJECTION, SOLUTION INTRAVENOUS at 05:18

## 2024-08-18 RX ADMIN — ATENOLOL 100 MG: 50 TABLET ORAL at 09:31

## 2024-08-18 RX ADMIN — AMLODIPINE BESYLATE 5 MG: 5 TABLET ORAL at 09:31

## 2024-08-18 RX ADMIN — MELATONIN TAB 3 MG 9 MG: 3 TAB at 22:05

## 2024-08-18 RX ADMIN — SODIUM CHLORIDE: 9 INJECTION, SOLUTION INTRAVENOUS at 07:53

## 2024-08-18 RX ADMIN — DORZOLAMIDE HCL 1 DROP: 20 SOLUTION/ DROPS OPHTHALMIC at 11:48

## 2024-08-18 RX ADMIN — ATORVASTATIN CALCIUM 80 MG: 80 TABLET, FILM COATED ORAL at 22:06

## 2024-08-18 RX ADMIN — ENOXAPARIN SODIUM 40 MG: 100 INJECTION SUBCUTANEOUS at 09:30

## 2024-08-18 RX ADMIN — LISINOPRIL 20 MG: 20 TABLET ORAL at 09:31

## 2024-08-18 RX ADMIN — BRIMONIDINE TARTRATE 1 DROP: 2 SOLUTION OPHTHALMIC at 22:06

## 2024-08-18 RX ADMIN — ASPIRIN 81 MG: 81 TABLET, CHEWABLE ORAL at 09:31

## 2024-08-18 RX ADMIN — PIPERACILLIN AND TAZOBACTAM 3375 MG: 3; .375 INJECTION, POWDER, LYOPHILIZED, FOR SOLUTION INTRAVENOUS at 16:35

## 2024-08-18 RX ADMIN — MIRTAZAPINE 7.5 MG: 15 TABLET, FILM COATED ORAL at 22:06

## 2024-08-18 RX ADMIN — ACETAMINOPHEN 650 MG: 325 TABLET ORAL at 22:06

## 2024-08-18 RX ADMIN — MELATONIN TAB 3 MG 9 MG: 3 TAB at 00:38

## 2024-08-18 RX ADMIN — DORZOLAMIDE HCL 1 DROP: 20 SOLUTION/ DROPS OPHTHALMIC at 22:06

## 2024-08-18 NOTE — PLAN OF CARE
Problem: Pain  Goal: Verbalizes/displays adequate comfort level or baseline comfort level  8/18/2024 1620 by Codie Melendez RN  Outcome: Progressing  8/18/2024 0351 by Alex Davison, RN  Outcome: Progressing     Problem: Safety - Adult  Goal: Free from fall injury  8/18/2024 1620 by Codie Melendez, RN  Outcome: Progressing  8/18/2024 0351 by Alex Davison, RN  Outcome: Progressing     Problem: Skin/Tissue Integrity  Goal: Absence of new skin breakdown  Description: 1.  Monitor for areas of redness and/or skin breakdown  2.  Assess vascular access sites hourly  3.  Every 4-6 hours minimum:  Change oxygen saturation probe site  4.  Every 4-6 hours:  If on nasal continuous positive airway pressure, respiratory therapy assess nares and determine need for appliance change or resting period.  8/18/2024 1620 by Codie Melendez, RN  Outcome: Progressing  8/18/2024 0351 by Alex Davison, RN  Outcome: Progressing

## 2024-08-19 VITALS
WEIGHT: 165 LBS | SYSTOLIC BLOOD PRESSURE: 154 MMHG | HEART RATE: 67 BPM | DIASTOLIC BLOOD PRESSURE: 64 MMHG | HEIGHT: 65 IN | BODY MASS INDEX: 27.49 KG/M2 | TEMPERATURE: 98.1 F | OXYGEN SATURATION: 97 % | RESPIRATION RATE: 18 BRPM

## 2024-08-19 LAB
BACTERIA BLD CULT: ABNORMAL
ORGANISM: ABNORMAL

## 2024-08-19 PROCEDURE — 6360000002 HC RX W HCPCS: Performed by: INTERNAL MEDICINE

## 2024-08-19 PROCEDURE — 2580000003 HC RX 258: Performed by: INTERNAL MEDICINE

## 2024-08-19 PROCEDURE — 6370000000 HC RX 637 (ALT 250 FOR IP): Performed by: INTERNAL MEDICINE

## 2024-08-19 PROCEDURE — 92526 ORAL FUNCTION THERAPY: CPT

## 2024-08-19 RX ORDER — AMOXICILLIN AND CLAVULANATE POTASSIUM 500; 125 MG/1; MG/1
1 TABLET, FILM COATED ORAL EVERY 8 HOURS SCHEDULED
Qty: 21 TABLET | Refills: 0 | Status: SHIPPED | OUTPATIENT
Start: 2024-08-19 | End: 2024-08-26

## 2024-08-19 RX ORDER — AMLODIPINE BESYLATE 5 MG/1
10 TABLET ORAL DAILY
Status: DISCONTINUED | OUTPATIENT
Start: 2024-08-19 | End: 2024-08-19 | Stop reason: HOSPADM

## 2024-08-19 RX ORDER — AMOXICILLIN AND CLAVULANATE POTASSIUM 500; 125 MG/1; MG/1
1 TABLET, FILM COATED ORAL EVERY 8 HOURS SCHEDULED
Status: DISCONTINUED | OUTPATIENT
Start: 2024-08-19 | End: 2024-08-19 | Stop reason: HOSPADM

## 2024-08-19 RX ORDER — LANOLIN ALCOHOL/MO/W.PET/CERES
9 CREAM (GRAM) TOPICAL NIGHTLY PRN
COMMUNITY
Start: 2024-08-19

## 2024-08-19 RX ORDER — AMLODIPINE BESYLATE 10 MG/1
10 TABLET ORAL DAILY
Qty: 30 TABLET | Refills: 3 | Status: SHIPPED | OUTPATIENT
Start: 2024-08-19

## 2024-08-19 RX ADMIN — ASPIRIN 81 MG: 81 TABLET, CHEWABLE ORAL at 09:59

## 2024-08-19 RX ADMIN — ENOXAPARIN SODIUM 40 MG: 100 INJECTION SUBCUTANEOUS at 09:58

## 2024-08-19 RX ADMIN — PIPERACILLIN AND TAZOBACTAM 3375 MG: 3; .375 INJECTION, POWDER, LYOPHILIZED, FOR SOLUTION INTRAVENOUS at 02:16

## 2024-08-19 RX ADMIN — ATENOLOL 100 MG: 50 TABLET ORAL at 09:59

## 2024-08-19 RX ADMIN — DORZOLAMIDE HCL 1 DROP: 20 SOLUTION/ DROPS OPHTHALMIC at 09:59

## 2024-08-19 RX ADMIN — BRIMONIDINE TARTRATE 1 DROP: 2 SOLUTION OPHTHALMIC at 10:00

## 2024-08-19 RX ADMIN — ACETAMINOPHEN 650 MG: 325 TABLET ORAL at 09:59

## 2024-08-19 RX ADMIN — LISINOPRIL 20 MG: 20 TABLET ORAL at 09:59

## 2024-08-19 RX ADMIN — HYDRALAZINE HYDROCHLORIDE 10 MG: 20 INJECTION INTRAMUSCULAR; INTRAVENOUS at 06:49

## 2024-08-19 RX ADMIN — AMOXICILLIN AND CLAVULANATE POTASSIUM 1 TABLET: 500; 125 TABLET, FILM COATED ORAL at 09:59

## 2024-08-19 RX ADMIN — TIMOLOL MALEATE 1 DROP: 5 SOLUTION OPHTHALMIC at 09:59

## 2024-08-19 RX ADMIN — AMLODIPINE BESYLATE 10 MG: 5 TABLET ORAL at 09:59

## 2024-08-19 NOTE — PLAN OF CARE
Problem: Pain  Goal: Verbalizes/displays adequate comfort level or baseline comfort level  8/19/2024 0338 by Alex Davison RN  Outcome: Progressing  8/19/2024 0337 by Alex Davison RN  Outcome: Progressing  8/18/2024 1620 by Codie Melendez RN  Outcome: Progressing     Problem: Safety - Adult  Goal: Free from fall injury  8/19/2024 0338 by Alex Davison RN  Outcome: Progressing  8/19/2024 0337 by Alex Davison RN  Outcome: Progressing  8/18/2024 1620 by Codie Melendez RN  Outcome: Progressing     Problem: Skin/Tissue Integrity  Goal: Absence of new skin breakdown  Description: 1.  Monitor for areas of redness and/or skin breakdown  2.  Assess vascular access sites hourly  3.  Every 4-6 hours minimum:  Change oxygen saturation probe site  4.  Every 4-6 hours:  If on nasal continuous positive airway pressure, respiratory therapy assess nares and determine need for appliance change or resting period.  8/19/2024 0338 by Alex Davison RN  Outcome: Progressing  8/19/2024 0337 by Alex Davison RN  Outcome: Progressing  8/18/2024 1620 by Codie Melendez RN  Outcome: Progressing     Problem: Nutrition Deficit:  Goal: Optimize nutritional status  Outcome: Progressing     Problem: Discharge Planning  Goal: Discharge to home or other facility with appropriate resources  Outcome: Progressing

## 2024-08-19 NOTE — PLAN OF CARE
Problem: Pain  Goal: Verbalizes/displays adequate comfort level or baseline comfort level  8/19/2024 0429 by Alex Davison RN  Outcome: Progressing  8/19/2024 0338 by Alex Davison RN  Outcome: Progressing  8/19/2024 0337 by Alex Davison RN  Outcome: Progressing  8/18/2024 1620 by Codie Melendez RN  Outcome: Progressing     Problem: Safety - Adult  Goal: Free from fall injury  8/19/2024 0429 by Alex Davison RN  Outcome: Progressing  8/19/2024 0338 by Alex Davison RN  Outcome: Progressing  8/19/2024 0337 by Alex Davison RN  Outcome: Progressing  8/18/2024 1620 by Codie Melendez RN  Outcome: Progressing     Problem: Skin/Tissue Integrity  Goal: Absence of new skin breakdown  Description: 1.  Monitor for areas of redness and/or skin breakdown  2.  Assess vascular access sites hourly  3.  Every 4-6 hours minimum:  Change oxygen saturation probe site  4.  Every 4-6 hours:  If on nasal continuous positive airway pressure, respiratory therapy assess nares and determine need for appliance change or resting period.  8/19/2024 0429 by Alex Davison RN  Outcome: Progressing  8/19/2024 0338 by Alex Davison RN  Outcome: Progressing  8/19/2024 0337 by Alex Davison RN  Outcome: Progressing  8/18/2024 1620 by Codie Melendez RN  Outcome: Progressing     Problem: Nutrition Deficit:  Goal: Optimize nutritional status  8/19/2024 0429 by Alex Davison RN  Outcome: Progressing  8/19/2024 0338 by Alex Davison RN  Outcome: Progressing     Problem: Discharge Planning  Goal: Discharge to home or other facility with appropriate resources  8/19/2024 0429 by Alex Davison RN  Outcome: Progressing  8/19/2024 0338 by Alex Davison RN  Outcome: Progressing

## 2024-08-19 NOTE — CARE COORDINATION
08/19/24 1211   IMM Letter   IMM Letter given to Patient/Family/Significant other/Guardian/POA/by: IGGY Reyez   IMM Letter date given: 08/19/24   IMM Letter time given: 1200

## 2024-08-19 NOTE — PROGRESS NOTES
Clinical Pharmacy Note: Positive Blood Culture Documentation    Pharmacy was notified by lab that Jelani Martínez has positive blood cultures.    Patient is positive for Klebsiella pneumoniae in  1 bottle .  Resistance markers present: none    Name of lab caller: Brenda  Name of receiving pharmacist: Augustin Gagnon, PharmD.  Time: 1632    Patient's current antimicrobial regimen of Zosyn 3.375 Q8 does provide appropriate empiric coverage.      Dr. Barrera was notified of the positive result at 1637.    New antimicrobials initiated after physician discussion: NA.     Thank you,  Augustin Gagnon, PharmD.    
   08/14/24 2018   RT Protocol   History Pulmonary Disease 0   Respiratory pattern 0   Breath sounds 2   Cough 0   Indications for Bronchodilator Therapy On home bronchodilators   Bronchodilator Assessment Score 2       
  Speech Language Pathology  Facility/Department: [address] ONCOLOGY  [phone]  SLP Clinical Swallow Evaluation       [] Initial Evaluation            [x] Daily Treatment Note         [] Discharge Summary    Patient: Jelani Martínez   : 1940   MRN: [id number]      Evaluation Date: 2024      Admitting Dx: UTI (urinary tract infection) [N39.0]  Acute cystitis without hematuria [N30.00]  Sepsis, due to unspecified organism, unspecified whether acute organ dysfunction present (HCC) [A41.9]  Pain: Denies                                  Recommendations      Recommended Diet and Intervention 2024:  Diet Solids Recommendation:  Dysphagia III Soft and bite sized  Liquid Consistency Recommendation:  Thin liquids  Recommended form of Meds: Meds whole with water or Meds in puree      ** 1:1 feed assistance     Compensatory strategies: Check for pocketing of food L, Check for pocketing of food R, Upright as possible with all PO intake , Assist Feed , Small bites/sips , Eat/feed slowly, Remain upright 30-45 min     Discharge Recommendations:  Recommend ongoing SLP for dysphagia therapy upon discharge from hospital     History/Course of Treatment     H&P: \"The patient is an 83-year-old black American gentleman, came to the emergency room with history of fever. The patient is a nursing home resident. He resides at home at [facility], was seen for UTI and treated 2-3 weeks ago at nursing home. The temperature went as high as 102 degrees Fahrenheit. The patient has moderate dementia, but he remains pleasant. The patient was given Tylenol at the nursing home and sent in for further evaluation. He has considerable dementia. There is no nausea, vomiting. No angina pectoris. Does have exertional shortness of breath.\"    Imaging:  Chest X-ray:   IMPRESSION:  Mild developing atelectasis or infiltrate in left lung base with a small left  effusion.  Follow up to resolution is suggested.     Stable left upper lung 
  Speech Language Pathology  Facility/Department: [address] ONCOLOGY  [phone]  SLP Clinical Swallow Evaluation       [x] Initial Evaluation            [] Daily Treatment Note         [] Discharge Summary    Patient: Jelani Martínez   : 1940   MRN: [id number]      Evaluation Date: 8/15/2024      Admitting Dx: UTI (urinary tract infection) [N39.0]  Acute cystitis without hematuria [N30.00]  Sepsis, due to unspecified organism, unspecified whether acute organ dysfunction present (HCC) [A41.9]  Pain: Denies                                  Recommendations      Recommended Diet and Intervention 8/15/2024:  Diet Solids Recommendation:  Dysphagia II Minced and Moist  Liquid Consistency Recommendation:  Thin liquids  Recommended form of Meds: Meds whole with water or Meds in puree          Compensatory strategies: Check for pocketing of food L, Check for pocketing of food R, Upright as possible with all PO intake , Assist Feed , Small bites/sips , Eat/feed slowly, Remain upright 30-45 min     Discharge Recommendations:  Recommend ongoing SLP for dysphagia therapy upon discharge from hospital     History/Course of Treatment     H&P: \"The patient is an 83-year-old black American gentleman, came to the emergency room with history of fever. The patient is a nursing home resident. He resides at home at [facility], was seen for UTI and treated 2-3 weeks ago at nursing home. The temperature went as high as 102 degrees Fahrenheit. The patient has moderate dementia, but he remains pleasant. The patient was given Tylenol at the nursing home and sent in for further evaluation. He has considerable dementia. There is no nausea, vomiting. No angina pectoris. Does have exertional shortness of breath.\"    Imaging:  Chest X-ray:   IMPRESSION:  Mild developing atelectasis or infiltrate in left lung base with a small left  effusion.  Follow up to resolution is suggested.     Stable left upper lung nodule.       History/Prior 
4 Eyes Skin Assessment     NAME:  Jelani Martínez  YOB: 1940  MEDICAL RECORD NUMBER:  [id number]    The patient is being assessed for  Admission    I agree that at least one RN has performed a thorough Head to Toe Skin Assessment on the patient. ALL assessment sites listed below have been assessed.      Areas assessed by both nurses:    Head, Face, Ears, Shoulders, Back, Chest, Arms, Elbows, Hands, Sacrum. Buttock, Coccyx, Ischium, and Legs. Feet and Heels        Does the Patient have a Wound? No noted wound(s)       [place] Prevention initiated by RN: No  Wound Care Orders initiated by RN: No    Pressure Injury (Stage 3,4, Unstageable, DTI, NWPT, and Complex wounds) if present, place Wound referral order by RN under : No    New Ostomies, if present place, Ostomy referral order under : No     Nurse 1 eSignature: Electronically signed by Thelma Fernandes RN on 8/14/24 at 6:56 PM EDT    **SHARE this note so that the co-signing nurse can place an eSignature**    Nurse 2 eSignature: Electronically signed by Adelaide Talavera RN on 8/14/24 at 6:57 PM EDT   
Department of Internal Medicine  General Internal Medicine   Progress Note      SUBJECTIVE: relatively symptom free but temp spiked to 101.7 at 3.30am today     History obtained from chart review, the patient, and nursing staff   General ROS: positive for  - fatigue, malaise, sleep disturbance, and weight loss  negative for - chills, fever, or night sweats  Psychological ROS: negative  Ophthalmic ROS: negative  Respiratory ROS: positive for - cough and shortness of breath  negative for - hemoptysis, sputum changes, stridor, or wheezing  Cardiovascular ROS: positive for - dyspnea on exertion  negative for - chest pain, loss of consciousness, orthopnea, or palpitations  Gastrointestinal ROS: no abdominal pain, change in bowel habits, or black or bloody stools  Genito-Urinary ROS: dysuria and frequency and no hematuria  Musculoskeletal ROS: negative  Neurological ROS: no TIA or stroke symptoms  Dermatological ROS: negative    OBJECTIVE      Medications      Current Facility-Administered Medications: piperacillin-tazobactam (ZOSYN) 3,375 mg in sodium chloride 0.9 % 50 mL IVPB (mini-bag), 3,375 mg, IntraVENous, Q8H  acetaminophen (TYLENOL) tablet 650 mg, 650 mg, Oral, TID  albuterol sulfate HFA (PROVENTIL;VENTOLIN;PROAIR) 108 (90 Base) MCG/ACT inhaler 2 puff, 2 puff, Inhalation, Q4H PRN  amLODIPine (NORVASC) tablet 5 mg, 5 mg, Oral, Daily  aspirin chewable tablet 81 mg, 81 mg, Oral, Daily  atenolol (TENORMIN) tablet 100 mg, 100 mg, Oral, Daily  brimonidine (ALPHAGAN) 0.2 % ophthalmic solution 1 drop, 1 drop, Both Eyes, BID  atorvastatin (LIPITOR) tablet 80 mg, 80 mg, Oral, Nightly  guaiFENesin (ROBITUSSIN) 100 MG/5ML liquid 200 mg, 200 mg, Oral, Q4H PRN  latanoprost (XALATAN) 0.005 % ophthalmic solution 1 drop, 1 drop, Both Eyes, Nightly  lisinopril (PRINIVIL;ZESTRIL) tablet 20 mg, 20 mg, Oral, Daily  mirtazapine (REMERON) tablet 7.5 mg, 7.5 mg, Oral, Nightly  ondansetron (ZOFRAN-ODT) disintegrating tablet 4 mg, 4 mg, 
Hospital Problems             Last Modified POA    * (Principal) UTI (urinary tract infection) 8/14/2024 Yes    Primary hypertension 8/14/2024 Yes    Dementia (HCC) 8/14/2024 Yes    Sepsis (HCC) 8/14/2024 Yes    Dehydration 8/14/2024 Yes    Lactic acidosis 8/14/2024 Yes   H&P dictated     
Inserted new IV at the right hand. Patient remained calm and pleasant.  
Occupational Therapy    [facility] - Inpatient Rehabilitation Department   Phone: [phone]    Occupational Therapy    [x] Initial Evaluation            [] Daily Treatment Note         [] Discharge Summary      Patient: Jelani Martínez   : 1940   MRN: [id number]   Date of Service:  2024    Admitting Diagnosis:  UTI (urinary tract infection)  Current Admission Summary: The patient is an 83-year-old black American gentleman, came to the emergency room with history of fever. The patient is a nursing home resident. He resides at home at [facility], was seen for UTI and treated 2-3 weeks ago at nursing [place]. The temperature went as high as 102 degrees Fahrenheit. The patient has moderate dementia, but he remains pleasant. The patient was given Tylenol at the nursing home and sent in for further evaluation. He has considerable dementia. There is no nausea, vomiting. No angina pectoris. Does have exertional shortness of breath.   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, stage 1 through stage 4 or unspecified chronic kidney disease, without heart failure, Benign prostatic hyperplasia without lower urinary tract symptoms, BPH (benign prostatic hyperplasia), Carpal tunnel syndrome, Cerebral infarction (HCC), Chronic kidney disease, stage 2 (mild), Cognitive communication deficit, Constipation, COVID-19, Diverticulosis, Dysuria, Emphysema, unspecified ([facility]), Hyperlipidemia, Hypertension, Hypokalemia, Major depression, Muscle weakness, Neuralgia and neuritis, Neuralgia and neuritis, Nicotine dependence, Peripheral vascular disease (HCC), Polyneuropathy, Polyosteoarthritis, Primary localized osteoarthritis, Radiculopathy, TIA (transient ischemic attack), TIA (transient ischemic attack), Urge incontinence, and Vascular dementia ([facility]).  Past Surgical History:  has a past 
Patient seen in ED, room 05.  Admission Initiated and completed based off paperwork from [facility] only to and through Home Medications.  The Floor RN will need to start at belongings and continue on with assessments in addition the 4 Eyes Assessment, Immunizations, Vaccines, Rights and Responsibilities, orientation to room, Plan of Care, Education/Learning Assessment, Education Plan, white board, height and weight, pain assessment and head to toe assessment.  Patient is alert and oriented to person only as patient has dementia.  Patient is from [facility] and is being admitted for UTI.   Home Medication Reconciliation has been modified to match the paperwork provided by [facility] and is completed.    Diet is regular no salt   Patient is a DNR CC  
Pt ate quarter pounder with cheese and french fries that his daughter brought in.   
Pt has had multiple brief outburst of cries and shaking lasting 10-15 seconds.  Pt cannot verbalize what he is feeling at the time, if he is happy or sad.   Pt then had a brief cry with outburst calling for his mama.  When asked about his mom,  pt states he wants his mama.  Pt was then comforted which seem to help momentarily.  
Shift assessment completed. Pt very restless and agitated. Pulled IV out. This nurse and multiple nurses attempted to insert new IV but pt kept on refusing and became aggressive towards nurses. Attempted to pull palmer out. Refused all medications. Attempted to reorient pt to situation without success. Currently in bed sleeping.  
Shift assessment completed. Routine vitals stable. Scheduled medications given. Patient is awake, alert and oriented. Respirations are easy and unlabored. Patient does not appear to be in distress, resting comfortably at this time. Call light within reach.    
Shift assessment. VSS. Patient awake, alert, withdrawn and having hallucinations. Blood pressure of 183/84 mmhg. PRN Hydralazine given. Patient able to cooperate and able to take his medicines. No further concerns needed at this time. Plan of care ongoing.  
mg, Oral, TID  albuterol sulfate HFA (PROVENTIL;VENTOLIN;PROAIR) 108 (90 Base) MCG/ACT inhaler 2 puff, 2 puff, Inhalation, Q4H PRN  amLODIPine (NORVASC) tablet 5 mg, 5 mg, Oral, Daily  aspirin chewable tablet 81 mg, 81 mg, Oral, Daily  atenolol (TENORMIN) tablet 100 mg, 100 mg, Oral, Daily  brimonidine (ALPHAGAN) 0.2 % ophthalmic solution 1 drop, 1 drop, Both Eyes, BID  atorvastatin (LIPITOR) tablet 80 mg, 80 mg, Oral, Nightly  guaiFENesin (ROBITUSSIN) 100 MG/5ML liquid 200 mg, 200 mg, Oral, Q4H PRN  latanoprost (XALATAN) 0.005 % ophthalmic solution 1 drop, 1 drop, Both Eyes, Nightly  lisinopril (PRINIVIL;ZESTRIL) tablet 20 mg, 20 mg, Oral, Daily  mirtazapine (REMERON) tablet 7.5 mg, 7.5 mg, Oral, Nightly  ondansetron (ZOFRAN-ODT) disintegrating tablet 4 mg, 4 mg, Oral, Q8H PRN  sennosides-docusate sodium (SENOKOT-S) 8.6-50 MG tablet 2 tablet, 2 tablet, Oral, Nightly  simethicone (MYLICON) chewable tablet 80 mg, 80 mg, Oral, Q8H PRN  0.9 % sodium chloride IV bolus 125 mL, 125 mL, IntraVENous, Once  acetaminophen (TYLENOL) tablet 650 mg, 650 mg, Oral, Q4H PRN  enoxaparin (LOVENOX) injection 40 mg, 40 mg, SubCUTAneous, Daily  dorzolamide (TRUSOPT) 2 % ophthalmic solution 1 drop, 1 drop, Both Eyes, BID  timolol (TIMOPTIC) 0.5 % ophthalmic solution 1 drop, 1 drop, Both Eyes, BID  carboxymethylcellulose PF (THERATEARS/REFRESH) 1 % ophthalmic gel 1 drop, 1 drop, Both Eyes, TID PRN         Objective:  BP (!) 160/75   Pulse 74   Temp 97.7 °F (36.5 °C) (Oral)   Resp 16   Ht 1.651 m (5' 5\")   Wt 74.8 kg (165 lb)   SpO2 94%   BMI 27.46 kg/m²      Patient Vitals for the past 24 hrs:   BP Temp Temp src Pulse Resp SpO2   08/16/24 0400 (!) 160/75 97.7 °F (36.5 °C) Oral 74 16 94 %   08/15/24 2215 (!) 149/71 99 °F (37.2 °C) Oral 69 16 97 %   08/15/24 1451 (!) 147/79 98.7 °F (37.1 °C) Oral 76 16 96 %     Patient Vitals for the past 96 hrs (Last 3 readings):   Weight   08/14/24 1250 74.8 kg (165 lb)           Intake/Output 
have anxiety component, as patient is responsive to touch and commands during episodes.     Functional Outcomes                 Cognition  Overall Cognitive Status: Impaired  Arousal/Alertness: delayed responses to stimuli  Following Commands: follows one step commands with repetition, follows one step commands with increased time  Memory: decreased short term memory  Safety Judgement: decreased awareness of need for safety  Problem Solving: assistance required to generate solutions, assistance required to implement solutions  Initiation: requires cues for all  Sequencing: requires cues for all  Orientation:    oriented to person  Command Following:   accurately follows one step commands    Education  Barriers To Learning: cognition  Patient Education: patient educated on goals, PT role and benefits, general safety, functional mobility training, energy conservation, transfer training, discharge recommendations  Learning Assessment:  patient will require reinforcement due to cognitive deficits    Assessment  Activity Tolerance: appropriate use of rest breaks  Impairments Requiring Therapeutic Intervention: decreased functional mobility, decreased strength, decreased endurance, decreased balance  Prognosis: fair  Clinical Assessment: Patient is a 82 yo male presenting with UTI, sepsis, and fever. At baseline patient requires 1-2 persons for squat pivot transfers. Unsure of baseline bed mobility due to cognition. Currently requiring max (A) x 2 for bed mobility as well as squat pivot transfers to the recliner chair. Increased time required for pericare due to large BM. Patient will continue to benefit from skilled PT services to progress independence with mobility.  Safety Interventions: patient left in chair, chair alarm in place, call light within reach, and nurse present    Plan  Frequency: 3-5 x/per week  Current Treatment Recommendations: strengthening, balance training, functional mobility training, transfer 
containing left inguinal hernia.  Degenerative changes of the lumbar spine.     1. Decompressed urinary bladder with Brown catheter in place demonstrating wall thickening, which may represent cystitis.  Correlate with urinalysis. 2. Mild left basilar consolidation may represent atelectasis or pneumonia. 3. Mild interlobular septal thickening in the lung bases may represent mild pulmonary edema.     XR CHEST PORTABLE    Result Date: 8/14/2024  EXAMINATION: ONE XRAY VIEW OF THE CHEST 8/14/2024 1:22 pm COMPARISON: 05/25/2024, 01/03/2024, 09/04/2022 HISTORY: ORDERING SYSTEM PROVIDED HISTORY: Sepsis TECHNOLOGIST PROVIDED HISTORY: Reason for exam:->Sepsis Reason for Exam: sepsis Initial evaluation FINDINGS: Monitor wires overlie the chest.  The trachea is midline.  The cardiac silhouette is unremarkable.  The right lung is clear.  There is redemonstration of a rounded nodule in the left upper lung field which appears stable over prior studies.  Refer to prior CT evaluation. There is mild atelectasis or developing infiltrate in the left lung base. Small left effusion.     Mild developing atelectasis or infiltrate in left lung base with a small left effusion.  Follow up to resolution is suggested. Stable left upper lung nodule.       Lab Results   Component Value Date/Time    GLUCOSE 95 08/18/2024 05:34 AM     Lab Results   Component Value Date/Time    POCGLU 120 01/04/2024 03:42 AM     BP (!) 174/84   Pulse 69   Temp 98 °F (36.7 °C) (Axillary)   Resp 18   Ht 1.651 m (5' 5\")   Wt 74.8 kg (165 lb)   SpO2 100%   BMI 27.46 kg/m²     Assessment and Plan:  Principal Problem:    UTI (urinary tract infection) -Established problem. Stable.klebsiella  on cx  Plan: cont zosyn per cx results.  Active Problems:    Primary hypertension -Established problem. Stable.  174/84  Plan: Continue medications. Continue to check BP q shift. CBC and BMP ordered to monitor for disease progression, medication side effect. Iv hydralazine 
discrete fluid collection.  No evidence of abdominal or pelvic lymphadenopathy. Bones/Soft Tissues: Fat containing left inguinal hernia.  Degenerative changes of the lumbar spine.     1. Decompressed urinary bladder with Brown catheter in place demonstrating wall thickening, which may represent cystitis.  Correlate with urinalysis. 2. Mild left basilar consolidation may represent atelectasis or pneumonia. 3. Mild interlobular septal thickening in the lung bases may represent mild pulmonary edema.     XR CHEST PORTABLE    Result Date: 8/14/2024  EXAMINATION: ONE XRAY VIEW OF THE CHEST 8/14/2024 1:22 pm COMPARISON: 05/25/2024, 01/03/2024, 09/04/2022 HISTORY: ORDERING SYSTEM PROVIDED HISTORY: Sepsis TECHNOLOGIST PROVIDED HISTORY: Reason for exam:->Sepsis Reason for Exam: sepsis Initial evaluation FINDINGS: Monitor wires overlie the chest.  The trachea is midline.  The cardiac silhouette is unremarkable.  The right lung is clear.  There is redemonstration of a rounded nodule in the left upper lung field which appears stable over prior studies.  Refer to prior CT evaluation. There is mild atelectasis or developing infiltrate in the left lung base. Small left effusion.     Mild developing atelectasis or infiltrate in left lung base with a small left effusion.  Follow up to resolution is suggested. Stable left upper lung nodule.       Lab Results   Component Value Date/Time    GLUCOSE 90 08/17/2024 06:01 AM     Lab Results   Component Value Date/Time    POCGLU 120 01/04/2024 03:42 AM     BP (!) 167/73   Pulse 71   Temp 98.2 °F (36.8 °C) (Axillary)   Resp 16   Ht 1.651 m (5' 5\")   Wt 74.8 kg (165 lb)   SpO2 97%   BMI 27.46 kg/m²     Assessment and Plan:  Principal Problem:    UTI (urinary tract infection) -Established problem. Stable.klebsiella  on cx  Plan: cont zosyn empirically. Awaiting cx results.  Active Problems:    Primary hypertension -Established problem. Stable.  167/73  Plan: Continue medications.

## 2024-08-19 NOTE — CARE COORDINATION
Case Management -  Discharge Note      Patient Name: Jelani Martínez                   YOB: 1940            Readmission Risk (Low < 19, Mod (19-27), High > 27): Readmission Risk Score: 18.5    Current PCP: Ignacio Cortez MD      (IMM) Important Message from Medicare:    Has pt received appropriate IMM before discharge if required: yes  Date: 08/19/24    PT AM-PAC: 7 /24  OT AM-PAC: 6 /24    Patient/patient representative has been educated on the benefits of long-term care as well as the possible risks of declining recommended services. Patient/patient representative has acknowledged the information provided and decided on the following discharge plan. Patient/ patient representative has been provided freedom of choice regarding service provider, supported by basic dialogue that supports the patient's individualized plan of care/goals.    Patient noted to have a discharge order.  Pt has been medically cleared to return to LTC (Long Term Care)    Patient discharged to   Home at [address] [place]  [address]  Report: [phone]  Fax: [phone]        Pre-cert Required/obtained: n/a  Transportation scheduled for 2:00 pm,  8/19/24  Transportation provided by [facility]  AVS faxed and agency notified: yes  The following prescriptions sent with pt:  n/a  Family Notified: yes via phone   Nurse to call report to facility      Nurse to call report to facility      Financial    Payor: [facility] MEDICARE / Plan: [facility] MEDICARE COMPLETE / Product Type: *No Product type* /     Pharmacy:  Potential assistance Purchasing Medications: No  Meds-to-Beds request: No      [id number] - [place], [place] - [address] - P [phone] - F [phone]  [address] [address]  Phone: [phone] Fax: [phone]    [place], [place] - [address] - P [phone] - F [phone]  [address] [address]  Phone:

## 2024-08-19 NOTE — DISCHARGE INSTR - COC
SIGNATURE:  {Esignature:[id number]}    CASE MANAGEMENT/SOCIAL WORK SECTION    Inpatient Status Date: 8/14/24    Readmission Risk Assessment Score:  Readmission Risk              Risk of Unplanned Readmission:  24         Discharging to Facility:    Home at [id number] [place]  [address]  Report: [phone]  Fax: [phone]     / signature: Electronically signed by IGGY Roman on 8/19/24 at 12:01 PM EDT    PHYSICIAN SECTION    Prognosis: Guarded    Condition at Discharge: Stable    Rehab Potential (if transferring to Rehab): Guarded    Recommended Labs or Other Treatments After Discharge: long term care     Physician Certification: I certify the above information and transfer of Jelani Martínez  is necessary for the continuing treatment of the diagnosis listed and that he requires Intermediate Nursing Care for greater 30 days.     Update Admission H&P: No change in H&P    PHYSICIAN SIGNATURE:  Electronically signed by Jomar Barrera MD on 8/19/24 at 8:01 AM EDT

## 2024-09-13 PROBLEM — E86.0 DEHYDRATION: Status: RESOLVED | Noted: 2024-08-14 | Resolved: 2024-09-13

## 2024-09-13 PROBLEM — N39.0 UTI (URINARY TRACT INFECTION): Status: RESOLVED | Noted: 2024-08-14 | Resolved: 2024-09-13

## 2024-10-15 ENCOUNTER — APPOINTMENT (OUTPATIENT)
Dept: CT IMAGING | Age: 84
DRG: 871 | End: 2024-10-15
Payer: MEDICARE

## 2024-10-15 ENCOUNTER — HOSPITAL ENCOUNTER (INPATIENT)
Age: 84
LOS: 6 days | DRG: 871 | End: 2024-10-21
Attending: EMERGENCY MEDICINE | Admitting: STUDENT IN AN ORGANIZED HEALTH CARE EDUCATION/TRAINING PROGRAM
Payer: MEDICARE

## 2024-10-15 ENCOUNTER — APPOINTMENT (OUTPATIENT)
Dept: GENERAL RADIOLOGY | Age: 84
DRG: 871 | End: 2024-10-15
Payer: MEDICARE

## 2024-10-15 DIAGNOSIS — R65.21 SEPTIC SHOCK (HCC): ICD-10-CM

## 2024-10-15 DIAGNOSIS — K56.609 SBO (SMALL BOWEL OBSTRUCTION) (HCC): Primary | ICD-10-CM

## 2024-10-15 DIAGNOSIS — A41.9 SEPTIC SHOCK (HCC): ICD-10-CM

## 2024-10-15 DIAGNOSIS — N17.9 ACUTE RENAL FAILURE, UNSPECIFIED ACUTE RENAL FAILURE TYPE (HCC): ICD-10-CM

## 2024-10-15 DIAGNOSIS — R41.82 ALTERED MENTAL STATUS, UNSPECIFIED ALTERED MENTAL STATUS TYPE: ICD-10-CM

## 2024-10-15 LAB
ALBUMIN SERPL-MCNC: 3.9 G/DL (ref 3.4–5)
ALBUMIN/GLOB SERPL: 0.8 {RATIO} (ref 1.1–2.2)
ALP SERPL-CCNC: 150 U/L (ref 40–129)
ALT SERPL-CCNC: 20 U/L (ref 10–40)
ANION GAP SERPL CALCULATED.3IONS-SCNC: 18 MMOL/L (ref 3–16)
AST SERPL-CCNC: 38 U/L (ref 15–37)
BASOPHILS # BLD: 0 K/UL (ref 0–0.2)
BASOPHILS NFR BLD: 0.1 %
BILIRUB SERPL-MCNC: 0.6 MG/DL (ref 0–1)
BUN SERPL-MCNC: 40 MG/DL (ref 7–20)
CALCIUM SERPL-MCNC: 9.8 MG/DL (ref 8.3–10.6)
CHLORIDE SERPL-SCNC: 97 MMOL/L (ref 99–110)
CO2 SERPL-SCNC: 26 MMOL/L (ref 21–32)
CREAT SERPL-MCNC: 4 MG/DL (ref 0.8–1.3)
DEPRECATED RDW RBC AUTO: 13.5 % (ref 12.4–15.4)
EOSINOPHIL # BLD: 0 K/UL (ref 0–0.6)
EOSINOPHIL NFR BLD: 0 %
FLUAV RNA RESP QL NAA+PROBE: NOT DETECTED
FLUBV RNA RESP QL NAA+PROBE: NOT DETECTED
GFR SERPLBLD CREATININE-BSD FMLA CKD-EPI: 14 ML/MIN/{1.73_M2}
GLUCOSE SERPL-MCNC: 131 MG/DL (ref 70–99)
HCT VFR BLD AUTO: 49.2 % (ref 40.5–52.5)
HGB BLD-MCNC: 16.1 G/DL (ref 13.5–17.5)
LACTATE BLDV-SCNC: 11.3 MMOL/L (ref 0.4–1.9)
LACTATE BLDV-SCNC: 4.9 MMOL/L (ref 0.4–1.9)
LYMPHOCYTES # BLD: 2.5 K/UL (ref 1–5.1)
LYMPHOCYTES NFR BLD: 15.2 %
MCH RBC QN AUTO: 32 PG (ref 26–34)
MCHC RBC AUTO-ENTMCNC: 32.7 G/DL (ref 31–36)
MCV RBC AUTO: 98 FL (ref 80–100)
MONOCYTES # BLD: 0.9 K/UL (ref 0–1.3)
MONOCYTES NFR BLD: 5.6 %
NEUTROPHILS # BLD: 12.8 K/UL (ref 1.7–7.7)
NEUTROPHILS NFR BLD: 79.1 %
PLATELET # BLD AUTO: 292 K/UL (ref 135–450)
PMV BLD AUTO: 9.1 FL (ref 5–10.5)
POTASSIUM SERPL-SCNC: 5.4 MMOL/L (ref 3.5–5.1)
POTASSIUM SERPL-SCNC: 5.7 MMOL/L (ref 3.5–5.1)
PROT SERPL-MCNC: 8.6 G/DL (ref 6.4–8.2)
RBC # BLD AUTO: 5.02 M/UL (ref 4.2–5.9)
REASON FOR REJECTION: NORMAL
REASON FOR REJECTION: NORMAL
REJECTED TEST: NORMAL
REJECTED TEST: NORMAL
SARS-COV-2 RNA RESP QL NAA+PROBE: NOT DETECTED
SODIUM SERPL-SCNC: 141 MMOL/L (ref 136–145)
TROPONIN, HIGH SENSITIVITY: 15 NG/L (ref 0–22)
TROPONIN, HIGH SENSITIVITY: 25 NG/L (ref 0–22)
WBC # BLD AUTO: 16.2 K/UL (ref 4–11)

## 2024-10-15 PROCEDURE — 84484 ASSAY OF TROPONIN QUANT: CPT

## 2024-10-15 PROCEDURE — 2580000003 HC RX 258: Performed by: STUDENT IN AN ORGANIZED HEALTH CARE EDUCATION/TRAINING PROGRAM

## 2024-10-15 PROCEDURE — 71045 X-RAY EXAM CHEST 1 VIEW: CPT

## 2024-10-15 PROCEDURE — 74176 CT ABD & PELVIS W/O CONTRAST: CPT

## 2024-10-15 PROCEDURE — 6360000002 HC RX W HCPCS: Performed by: EMERGENCY MEDICINE

## 2024-10-15 PROCEDURE — 74018 RADEX ABDOMEN 1 VIEW: CPT

## 2024-10-15 PROCEDURE — 36415 COLL VENOUS BLD VENIPUNCTURE: CPT

## 2024-10-15 PROCEDURE — 70450 CT HEAD/BRAIN W/O DYE: CPT

## 2024-10-15 PROCEDURE — 06HY33Z INSERTION OF INFUSION DEVICE INTO LOWER VEIN, PERCUTANEOUS APPROACH: ICD-10-PCS | Performed by: EMERGENCY MEDICINE

## 2024-10-15 PROCEDURE — 36556 INSERT NON-TUNNEL CV CATH: CPT

## 2024-10-15 PROCEDURE — 93005 ELECTROCARDIOGRAM TRACING: CPT | Performed by: EMERGENCY MEDICINE

## 2024-10-15 PROCEDURE — 83605 ASSAY OF LACTIC ACID: CPT

## 2024-10-15 PROCEDURE — 3E033XZ INTRODUCTION OF VASOPRESSOR INTO PERIPHERAL VEIN, PERCUTANEOUS APPROACH: ICD-10-PCS | Performed by: STUDENT IN AN ORGANIZED HEALTH CARE EDUCATION/TRAINING PROGRAM

## 2024-10-15 PROCEDURE — 87636 SARSCOV2 & INF A&B AMP PRB: CPT

## 2024-10-15 PROCEDURE — 2000000000 HC ICU R&B

## 2024-10-15 PROCEDURE — 96375 TX/PRO/DX INJ NEW DRUG ADDON: CPT

## 2024-10-15 PROCEDURE — 99285 EMERGENCY DEPT VISIT HI MDM: CPT

## 2024-10-15 PROCEDURE — 2700000000 HC OXYGEN THERAPY PER DAY

## 2024-10-15 PROCEDURE — 80053 COMPREHEN METABOLIC PANEL: CPT

## 2024-10-15 PROCEDURE — 96365 THER/PROPH/DIAG IV INF INIT: CPT

## 2024-10-15 PROCEDURE — 0D9670Z DRAINAGE OF STOMACH WITH DRAINAGE DEVICE, VIA NATURAL OR ARTIFICIAL OPENING: ICD-10-PCS | Performed by: STUDENT IN AN ORGANIZED HEALTH CARE EDUCATION/TRAINING PROGRAM

## 2024-10-15 PROCEDURE — 84132 ASSAY OF SERUM POTASSIUM: CPT

## 2024-10-15 PROCEDURE — 71250 CT THORAX DX C-: CPT

## 2024-10-15 PROCEDURE — 2580000003 HC RX 258: Performed by: EMERGENCY MEDICINE

## 2024-10-15 PROCEDURE — 85025 COMPLETE CBC W/AUTO DIFF WBC: CPT

## 2024-10-15 RX ORDER — SODIUM CHLORIDE, SODIUM LACTATE, POTASSIUM CHLORIDE, AND CALCIUM CHLORIDE .6; .31; .03; .02 G/100ML; G/100ML; G/100ML; G/100ML
30 INJECTION, SOLUTION INTRAVENOUS ONCE
Status: COMPLETED | OUTPATIENT
Start: 2024-10-15 | End: 2024-10-15

## 2024-10-15 RX ORDER — LINEZOLID 2 MG/ML
600 INJECTION, SOLUTION INTRAVENOUS EVERY 12 HOURS
Status: DISCONTINUED | OUTPATIENT
Start: 2024-10-16 | End: 2024-10-22 | Stop reason: HOSPADM

## 2024-10-15 RX ORDER — ATENOLOL 50 MG/1
100 TABLET ORAL DAILY
Status: DISCONTINUED | OUTPATIENT
Start: 2024-10-16 | End: 2024-10-18

## 2024-10-15 RX ORDER — POLYETHYLENE GLYCOL 3350 17 G/17G
17 POWDER, FOR SOLUTION ORAL DAILY PRN
Status: DISCONTINUED | OUTPATIENT
Start: 2024-10-15 | End: 2024-10-22 | Stop reason: HOSPADM

## 2024-10-15 RX ORDER — BRIMONIDINE TARTRATE 2 MG/ML
1 SOLUTION/ DROPS OPHTHALMIC 2 TIMES DAILY
Status: DISCONTINUED | OUTPATIENT
Start: 2024-10-16 | End: 2024-10-22 | Stop reason: HOSPADM

## 2024-10-15 RX ORDER — ACETAMINOPHEN 650 MG/1
650 SUPPOSITORY RECTAL EVERY 6 HOURS PRN
Status: DISCONTINUED | OUTPATIENT
Start: 2024-10-15 | End: 2024-10-22 | Stop reason: HOSPADM

## 2024-10-15 RX ORDER — LATANOPROST 50 UG/ML
1 SOLUTION/ DROPS OPHTHALMIC NIGHTLY
Status: DISCONTINUED | OUTPATIENT
Start: 2024-10-16 | End: 2024-10-22 | Stop reason: HOSPADM

## 2024-10-15 RX ORDER — SODIUM CHLORIDE 0.9 % (FLUSH) 0.9 %
5-40 SYRINGE (ML) INJECTION EVERY 12 HOURS SCHEDULED
Status: DISCONTINUED | OUTPATIENT
Start: 2024-10-16 | End: 2024-10-22 | Stop reason: HOSPADM

## 2024-10-15 RX ORDER — DORZOLAMIDE HYDROCHLORIDE AND TIMOLOL MALEATE 20; 5 MG/ML; MG/ML
1 SOLUTION/ DROPS OPHTHALMIC 2 TIMES DAILY
Status: DISCONTINUED | OUTPATIENT
Start: 2024-10-16 | End: 2024-10-16 | Stop reason: CLARIF

## 2024-10-15 RX ORDER — LANOLIN ALCOHOL/MO/W.PET/CERES
9 CREAM (GRAM) TOPICAL NIGHTLY PRN
Status: DISCONTINUED | OUTPATIENT
Start: 2024-10-15 | End: 2024-10-22 | Stop reason: HOSPADM

## 2024-10-15 RX ORDER — HEPARIN SODIUM 5000 [USP'U]/ML
5000 INJECTION, SOLUTION INTRAVENOUS; SUBCUTANEOUS EVERY 8 HOURS SCHEDULED
Status: DISCONTINUED | OUTPATIENT
Start: 2024-10-16 | End: 2024-10-22 | Stop reason: HOSPADM

## 2024-10-15 RX ORDER — LISINOPRIL 20 MG/1
20 TABLET ORAL DAILY
Status: DISCONTINUED | OUTPATIENT
Start: 2024-10-16 | End: 2024-10-18

## 2024-10-15 RX ORDER — AMLODIPINE BESYLATE 5 MG/1
10 TABLET ORAL DAILY
Status: DISCONTINUED | OUTPATIENT
Start: 2024-10-16 | End: 2024-10-18

## 2024-10-15 RX ORDER — NOREPINEPHRINE BITARTRATE 0.06 MG/ML
1-100 INJECTION, SOLUTION INTRAVENOUS CONTINUOUS
Status: DISCONTINUED | OUTPATIENT
Start: 2024-10-15 | End: 2024-10-17

## 2024-10-15 RX ORDER — SODIUM CHLORIDE 9 MG/ML
INJECTION, SOLUTION INTRAVENOUS PRN
Status: DISCONTINUED | OUTPATIENT
Start: 2024-10-15 | End: 2024-10-22 | Stop reason: HOSPADM

## 2024-10-15 RX ORDER — ONDANSETRON 2 MG/ML
4 INJECTION INTRAMUSCULAR; INTRAVENOUS EVERY 6 HOURS PRN
Status: DISCONTINUED | OUTPATIENT
Start: 2024-10-15 | End: 2024-10-22 | Stop reason: HOSPADM

## 2024-10-15 RX ORDER — ATORVASTATIN CALCIUM 80 MG/1
80 TABLET, FILM COATED ORAL NIGHTLY
Status: DISCONTINUED | OUTPATIENT
Start: 2024-10-16 | End: 2024-10-22 | Stop reason: HOSPADM

## 2024-10-15 RX ORDER — ASPIRIN 81 MG/1
81 TABLET, CHEWABLE ORAL DAILY
Status: DISCONTINUED | OUTPATIENT
Start: 2024-10-16 | End: 2024-10-22 | Stop reason: HOSPADM

## 2024-10-15 RX ORDER — SODIUM CHLORIDE 0.9 % (FLUSH) 0.9 %
5-40 SYRINGE (ML) INJECTION PRN
Status: DISCONTINUED | OUTPATIENT
Start: 2024-10-15 | End: 2024-10-22 | Stop reason: HOSPADM

## 2024-10-15 RX ORDER — SODIUM CHLORIDE 9 MG/ML
INJECTION, SOLUTION INTRAVENOUS CONTINUOUS
Status: DISCONTINUED | OUTPATIENT
Start: 2024-10-15 | End: 2024-10-15

## 2024-10-15 RX ORDER — MIDAZOLAM HYDROCHLORIDE 2 MG/2ML
4 INJECTION, SOLUTION INTRAMUSCULAR; INTRAVENOUS ONCE
Status: COMPLETED | OUTPATIENT
Start: 2024-10-15 | End: 2024-10-15

## 2024-10-15 RX ORDER — ONDANSETRON 4 MG/1
4 TABLET, ORALLY DISINTEGRATING ORAL EVERY 8 HOURS PRN
Status: DISCONTINUED | OUTPATIENT
Start: 2024-10-15 | End: 2024-10-22 | Stop reason: HOSPADM

## 2024-10-15 RX ORDER — SODIUM CHLORIDE 9 MG/ML
INJECTION, SOLUTION INTRAVENOUS CONTINUOUS
Status: ACTIVE | OUTPATIENT
Start: 2024-10-15 | End: 2024-10-16

## 2024-10-15 RX ORDER — ACETAMINOPHEN 325 MG/1
650 TABLET ORAL EVERY 6 HOURS PRN
Status: DISCONTINUED | OUTPATIENT
Start: 2024-10-15 | End: 2024-10-22 | Stop reason: HOSPADM

## 2024-10-15 RX ORDER — PHENYLEPHRINE HCL IN 0.9% NACL 1 MG/10 ML
200 SYRINGE (ML) INTRAVENOUS ONCE
Status: COMPLETED | OUTPATIENT
Start: 2024-10-15 | End: 2024-10-15

## 2024-10-15 RX ORDER — MIRTAZAPINE 15 MG/1
7.5 TABLET, FILM COATED ORAL NIGHTLY
Status: DISCONTINUED | OUTPATIENT
Start: 2024-10-16 | End: 2024-10-22 | Stop reason: HOSPADM

## 2024-10-15 RX ADMIN — MIDAZOLAM 4 MG: 1 INJECTION INTRAMUSCULAR; INTRAVENOUS at 19:32

## 2024-10-15 RX ADMIN — SODIUM CHLORIDE: 9 INJECTION, SOLUTION INTRAVENOUS at 21:53

## 2024-10-15 RX ADMIN — PIPERACILLIN AND TAZOBACTAM 3375 MG: 3; .375 INJECTION, POWDER, LYOPHILIZED, FOR SOLUTION INTRAVENOUS at 16:46

## 2024-10-15 RX ADMIN — Medication 0.2 MG: at 19:49

## 2024-10-15 RX ADMIN — SODIUM CHLORIDE, POTASSIUM CHLORIDE, SODIUM LACTATE AND CALCIUM CHLORIDE 2040 ML: 600; 310; 30; 20 INJECTION, SOLUTION INTRAVENOUS at 16:29

## 2024-10-15 ASSESSMENT — PAIN - FUNCTIONAL ASSESSMENT: PAIN_FUNCTIONAL_ASSESSMENT: FACE, LEGS, ACTIVITY, CRY, AND CONSOLABILITY (FLACC)

## 2024-10-15 NOTE — ED NOTES
RN attempted to complete straight cath with assistance of charge RN and ER tech.   Unable to pass due to enlarged prostate.   Charge RN to notify provider.

## 2024-10-15 NOTE — ED PROVIDER NOTES
[facility] Emergency Department      Pt Name: Jelani Martínez  MRN: [id number]  Birthdate 1940  Date of evaluation: 10/15/2024  Provider: DOYLE PECK MD  CHIEF COMPLAINT  Chief Complaint   Patient presents with    Altered Mental Status     Pt was sent from nursing home to ER for altered mental status. Nursing home staff reports that pt was rolling around trying to talk to women residents in his wheelchair and not responding to staff. Pt is DNR and paperwork is at bedside.      HPI  Jelani Martínez is a 83 y.o. male who presents because of concern for altered mental status.  He lives in a nursing home and apparently was more talkative this morning with the female residents.  He later had episodes of decreased responsiveness.  He is responsive here.  He is unable to provide any history.  He has a history of dementia at baseline.    REVIEW OF SYSTEMS:  See HPI for further details. Remainder of review of systems limited by patient ability to provide history.  Nursing notes reviewed.    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, stage 1 through stage 4 or unspecified chronic kidney disease, without heart failure     Benign prostatic hyperplasia without lower urinary tract symptoms     BPH (benign prostatic hyperplasia)     Carpal tunnel syndrome     Cerebral infarction (HCC)     Chronic kidney disease, stage 2 (mild)     Cognitive communication deficit     Constipation     COVID-19 12/30/2021    Diverticulosis     Dysuria     Emphysema, unspecified (HCC)     Hyperlipidemia     Hypertension     Hypokalemia     Major depression     Muscle weakness     Neuralgia and neuritis     Neuralgia and neuritis     Nicotine dependence     Peripheral vascular disease (HCC)     Polyneuropathy     Polyosteoarthritis     Primary localized osteoarthritis      Considerations (Tests not ordered but considered, Shared Decision Making, Pt Expectation of Test or Tx.):  MR imaging not deemed clinically necessary in the ED setting    PROCEDURES:  Central Line:  Performed by me  The procedure was performed in an emergent situation with implied consent from POA  Risks, benefits, 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  Site was marked, Indication: Vascular access, pressors  Anesthesia: local infiltration with lidocaine 1% ml 4  Patient sedated: no  Preparation: skin prepped with 2% chlorhexidine  Skin prep agent completely dried prior to procedure  All five maximum sterile barriers used - cap, mask, sterile gown, sterile gloves, and large sterile sheet; hand hygiene performed prior to central venous catheter insertion  Location details: right femoral vein  Patient position: supine  Catheter type: triple lumen, 7 Fr  Ultrasound guidance: yes, landmarks identified  Number of attempts: 1  Successful placement: yes  Post-procedure: line sutured and dressing applied  Assessment: blood return through all ports  Patient tolerance: Patient tolerated the procedure well with no immediate complications     CRITICAL CARE:  Total critical care time of 51 minutes (excludes any time for procedures).  This includes but not limited to vital sign monitoring, medication and/or clinical response to medications, interpretation of diagnostics, review of nursing notes, pertinent record review, discussions about patient condition, consultation time, documentation time.  Critical care due to the patient's sbo, sepsis.    CONSULTATIONS:  Dr Sullivan, Hospitalist, Cole patient's son and JYOTSNAEMRE Martínez is a 83 y.o. male who presented because of concern for altered mental status.  History was very limited due to his dementia and he had difficult IV placement and blood draw on arrival.  He did not tolerate attempt at obtaining urine via catheter.  He ultimately was found to have SBO,

## 2024-10-15 NOTE — ED NOTES
This RN attempted to start peripheral IV x3.   Another experienced ER RN attempted to start peripheral IV x3.  Another RN at bedside attempted to start an US guided peripheral IV.  This RN updated provider. Provider and laboratory aware.

## 2024-10-16 LAB
ALBUMIN SERPL-MCNC: 3.2 G/DL (ref 3.4–5)
ALBUMIN/GLOB SERPL: 0.9 {RATIO} (ref 1.1–2.2)
ALP SERPL-CCNC: 108 U/L (ref 40–129)
ALT SERPL-CCNC: 12 U/L (ref 10–40)
ANION GAP SERPL CALCULATED.3IONS-SCNC: 11 MMOL/L (ref 3–16)
APTT BLD: 28.1 SEC (ref 22.1–36.4)
AST SERPL-CCNC: 26 U/L (ref 15–37)
BACTERIA URNS QL MICRO: ABNORMAL /HPF
BASOPHILS # BLD: 0 K/UL (ref 0–0.2)
BASOPHILS NFR BLD: 0.3 %
BILIRUB SERPL-MCNC: 0.6 MG/DL (ref 0–1)
BILIRUB UR QL STRIP.AUTO: ABNORMAL
BUN SERPL-MCNC: 48 MG/DL (ref 7–20)
CALCIUM SERPL-MCNC: 8.5 MG/DL (ref 8.3–10.6)
CHLORIDE SERPL-SCNC: 103 MMOL/L (ref 99–110)
CLARITY UR: ABNORMAL
CO2 SERPL-SCNC: 27 MMOL/L (ref 21–32)
COLOR UR: ABNORMAL
CREAT SERPL-MCNC: 3.3 MG/DL (ref 0.8–1.3)
DEPRECATED RDW RBC AUTO: 13.2 % (ref 12.4–15.4)
EOSINOPHIL # BLD: 0 K/UL (ref 0–0.6)
EOSINOPHIL NFR BLD: 0.2 %
EPI CELLS #/AREA URNS HPF: ABNORMAL /HPF (ref 0–5)
FLUAV RNA RESP QL NAA+PROBE: NOT DETECTED
FLUBV RNA RESP QL NAA+PROBE: NOT DETECTED
GFR SERPLBLD CREATININE-BSD FMLA CKD-EPI: 18 ML/MIN/{1.73_M2}
GLUCOSE SERPL-MCNC: 107 MG/DL (ref 70–99)
GLUCOSE UR STRIP.AUTO-MCNC: NEGATIVE MG/DL
HCT VFR BLD AUTO: 33.8 % (ref 40.5–52.5)
HGB BLD-MCNC: 11.6 G/DL (ref 13.5–17.5)
HGB UR QL STRIP.AUTO: ABNORMAL
HYALINE CASTS #/AREA URNS LPF: ABNORMAL /LPF (ref 0–2)
INR PPP: 1.26 (ref 0.85–1.15)
KETONES UR STRIP.AUTO-MCNC: ABNORMAL MG/DL
LACTATE BLDV-SCNC: 1.9 MMOL/L (ref 0.4–2)
LEUKOCYTE ESTERASE UR QL STRIP.AUTO: NEGATIVE
LYMPHOCYTES # BLD: 2.4 K/UL (ref 1–5.1)
LYMPHOCYTES NFR BLD: 17.2 %
MAGNESIUM SERPL-MCNC: 1.92 MG/DL (ref 1.8–2.4)
MCH RBC QN AUTO: 31.7 PG (ref 26–34)
MCHC RBC AUTO-ENTMCNC: 34.2 G/DL (ref 31–36)
MCV RBC AUTO: 92.7 FL (ref 80–100)
MONOCYTES # BLD: 0.9 K/UL (ref 0–1.3)
MONOCYTES NFR BLD: 6.1 %
NEUTROPHILS # BLD: 10.8 K/UL (ref 1.7–7.7)
NEUTROPHILS NFR BLD: 76.2 %
NITRITE UR QL STRIP.AUTO: NEGATIVE
PH UR STRIP.AUTO: 5 [PH] (ref 5–8)
PHOSPHATE SERPL-MCNC: 2.9 MG/DL (ref 2.5–4.9)
PLATELET # BLD AUTO: 264 K/UL (ref 135–450)
PMV BLD AUTO: 8.8 FL (ref 5–10.5)
POTASSIUM SERPL-SCNC: 4.4 MMOL/L (ref 3.5–5.1)
PREALB SERPL-MCNC: 11.4 MG/DL (ref 20–40)
PROT SERPL-MCNC: 6.8 G/DL (ref 6.4–8.2)
PROT UR STRIP.AUTO-MCNC: >=300 MG/DL
PROTHROMBIN TIME: 16 SEC (ref 11.9–14.9)
RBC # BLD AUTO: 3.64 M/UL (ref 4.2–5.9)
RBC #/AREA URNS HPF: >100 /HPF (ref 0–4)
SARS-COV-2 RNA RESP QL NAA+PROBE: NOT DETECTED
SODIUM SERPL-SCNC: 141 MMOL/L (ref 136–145)
SP GR UR STRIP.AUTO: 1.02 (ref 1–1.03)
TRANSFERRIN SERPL-MCNC: 123 MG/DL (ref 200–360)
UA COMPLETE W REFLEX CULTURE PNL UR: YES
UA DIPSTICK W REFLEX MICRO PNL UR: YES
URN SPEC COLLECT METH UR: ABNORMAL
UROBILINOGEN UR STRIP-ACNC: 0.2 E.U./DL
WBC # BLD AUTO: 14.2 K/UL (ref 4–11)
WBC #/AREA URNS HPF: ABNORMAL /HPF (ref 0–5)

## 2024-10-16 PROCEDURE — 83605 ASSAY OF LACTIC ACID: CPT

## 2024-10-16 PROCEDURE — 6360000002 HC RX W HCPCS: Performed by: INTERNAL MEDICINE

## 2024-10-16 PROCEDURE — 85025 COMPLETE CBC W/AUTO DIFF WBC: CPT

## 2024-10-16 PROCEDURE — 6370000000 HC RX 637 (ALT 250 FOR IP): Performed by: STUDENT IN AN ORGANIZED HEALTH CARE EDUCATION/TRAINING PROGRAM

## 2024-10-16 PROCEDURE — 2580000003 HC RX 258: Performed by: INTERNAL MEDICINE

## 2024-10-16 PROCEDURE — 85730 THROMBOPLASTIN TIME PARTIAL: CPT

## 2024-10-16 PROCEDURE — 87636 SARSCOV2 & INF A&B AMP PRB: CPT

## 2024-10-16 PROCEDURE — 6360000002 HC RX W HCPCS: Performed by: NURSE PRACTITIONER

## 2024-10-16 PROCEDURE — 2580000003 HC RX 258: Performed by: STUDENT IN AN ORGANIZED HEALTH CARE EDUCATION/TRAINING PROGRAM

## 2024-10-16 PROCEDURE — 87081 CULTURE SCREEN ONLY: CPT

## 2024-10-16 PROCEDURE — 6370000000 HC RX 637 (ALT 250 FOR IP): Performed by: INTERNAL MEDICINE

## 2024-10-16 PROCEDURE — 6360000002 HC RX W HCPCS: Performed by: STUDENT IN AN ORGANIZED HEALTH CARE EDUCATION/TRAINING PROGRAM

## 2024-10-16 PROCEDURE — 2060000000 HC ICU INTERMEDIATE R&B

## 2024-10-16 PROCEDURE — 84466 ASSAY OF TRANSFERRIN: CPT

## 2024-10-16 PROCEDURE — 81001 URINALYSIS AUTO W/SCOPE: CPT

## 2024-10-16 PROCEDURE — 83735 ASSAY OF MAGNESIUM: CPT

## 2024-10-16 PROCEDURE — 87040 BLOOD CULTURE FOR BACTERIA: CPT

## 2024-10-16 PROCEDURE — 84100 ASSAY OF PHOSPHORUS: CPT

## 2024-10-16 PROCEDURE — 80053 COMPREHEN METABOLIC PANEL: CPT

## 2024-10-16 PROCEDURE — 2580000003 HC RX 258: Performed by: NURSE PRACTITIONER

## 2024-10-16 PROCEDURE — 36415 COLL VENOUS BLD VENIPUNCTURE: CPT

## 2024-10-16 PROCEDURE — 87086 URINE CULTURE/COLONY COUNT: CPT

## 2024-10-16 PROCEDURE — 99222 1ST HOSP IP/OBS MODERATE 55: CPT | Performed by: SURGERY

## 2024-10-16 PROCEDURE — 84134 ASSAY OF PREALBUMIN: CPT

## 2024-10-16 PROCEDURE — 85610 PROTHROMBIN TIME: CPT

## 2024-10-16 RX ORDER — POLYETHYLENE GLYCOL 3350 17 G/17G
17 POWDER, FOR SOLUTION ORAL DAILY
COMMUNITY

## 2024-10-16 RX ORDER — TIMOLOL MALEATE 5 MG/ML
1 SOLUTION/ DROPS OPHTHALMIC 2 TIMES DAILY
Status: DISCONTINUED | OUTPATIENT
Start: 2024-10-16 | End: 2024-10-22 | Stop reason: HOSPADM

## 2024-10-16 RX ORDER — DORZOLAMIDE HCL 20 MG/ML
1 SOLUTION/ DROPS OPHTHALMIC 2 TIMES DAILY
Status: DISCONTINUED | OUTPATIENT
Start: 2024-10-16 | End: 2024-10-22 | Stop reason: HOSPADM

## 2024-10-16 RX ORDER — TRIMETHOPRIM 100 MG/1
50 TABLET ORAL DAILY
COMMUNITY

## 2024-10-16 RX ORDER — HYDROMORPHONE HYDROCHLORIDE 1 MG/ML
0.5 INJECTION, SOLUTION INTRAMUSCULAR; INTRAVENOUS; SUBCUTANEOUS EVERY 4 HOURS PRN
Status: COMPLETED | OUTPATIENT
Start: 2024-10-16 | End: 2024-10-20

## 2024-10-16 RX ADMIN — LINEZOLID 600 MG: 600 INJECTION, SOLUTION INTRAVENOUS at 01:52

## 2024-10-16 RX ADMIN — POLYVINYL ALCOHOL, POVIDONE 2 DROP: 14; 6 SOLUTION/ DROPS OPHTHALMIC at 15:34

## 2024-10-16 RX ADMIN — TIMOLOL MALEATE 1 DROP: 5 SOLUTION OPHTHALMIC at 11:49

## 2024-10-16 RX ADMIN — SODIUM CHLORIDE: 0.9 INJECTION, SOLUTION INTRAVENOUS at 01:50

## 2024-10-16 RX ADMIN — PIPERACILLIN SODIUM,TAZOBACTAM SODIUM 3375 MG: 3; .375 INJECTION, POWDER, FOR SOLUTION INTRAVENOUS at 17:04

## 2024-10-16 RX ADMIN — DORZOLAMIDE HYDROCHLORIDE 1 DROP: 20 SOLUTION OPHTHALMIC at 20:50

## 2024-10-16 RX ADMIN — BRIMONIDINE TARTRATE 1 DROP: 2 SOLUTION OPHTHALMIC at 20:50

## 2024-10-16 RX ADMIN — HEPARIN SODIUM 5000 UNITS: 5000 INJECTION INTRAVENOUS; SUBCUTANEOUS at 05:31

## 2024-10-16 RX ADMIN — POLYVINYL ALCOHOL, POVIDONE 2 DROP: 14; 6 SOLUTION/ DROPS OPHTHALMIC at 11:58

## 2024-10-16 RX ADMIN — SODIUM CHLORIDE 0.5 MG: 9 INJECTION INTRAMUSCULAR; INTRAVENOUS; SUBCUTANEOUS at 00:57

## 2024-10-16 RX ADMIN — BRIMONIDINE TARTRATE 1 DROP: 2 SOLUTION OPHTHALMIC at 11:44

## 2024-10-16 RX ADMIN — SODIUM CHLORIDE: 9 INJECTION, SOLUTION INTRAVENOUS at 06:24

## 2024-10-16 RX ADMIN — TIMOLOL MALEATE 1 DROP: 5 SOLUTION OPHTHALMIC at 20:50

## 2024-10-16 RX ADMIN — DORZOLAMIDE HYDROCHLORIDE 1 DROP: 20 SOLUTION OPHTHALMIC at 11:49

## 2024-10-16 RX ADMIN — HEPARIN SODIUM 5000 UNITS: 5000 INJECTION INTRAVENOUS; SUBCUTANEOUS at 20:50

## 2024-10-16 RX ADMIN — MIRTAZAPINE 7.5 MG: 15 TABLET, FILM COATED ORAL at 20:49

## 2024-10-16 RX ADMIN — HEPARIN SODIUM 5000 UNITS: 5000 INJECTION INTRAVENOUS; SUBCUTANEOUS at 15:34

## 2024-10-16 RX ADMIN — LINEZOLID 600 MG: 600 INJECTION, SOLUTION INTRAVENOUS at 11:57

## 2024-10-16 RX ADMIN — ATORVASTATIN CALCIUM 80 MG: 80 TABLET, FILM COATED ORAL at 20:50

## 2024-10-16 RX ADMIN — LINEZOLID 600 MG: 600 INJECTION, SOLUTION INTRAVENOUS at 23:48

## 2024-10-16 RX ADMIN — PIPERACILLIN AND TAZOBACTAM 4500 MG: 4; .5 INJECTION, POWDER, FOR SOLUTION INTRAVENOUS at 04:53

## 2024-10-16 RX ADMIN — LATANOPROST 1 DROP: 50 SOLUTION OPHTHALMIC at 20:50

## 2024-10-16 RX ADMIN — POLYVINYL ALCOHOL, POVIDONE 2 DROP: 14; 6 SOLUTION/ DROPS OPHTHALMIC at 20:50

## 2024-10-16 ASSESSMENT — PAIN SCALES - PAIN ASSESSMENT IN ADVANCED DEMENTIA (PAINAD)
FACIALEXPRESSION: SMILING OR INEXPRESSIVE
CONSOLABILITY: NO NEED TO CONSOLE
FACIALEXPRESSION: SMILING OR INEXPRESSIVE
BREATHING: NORMAL
FACIALEXPRESSION: SMILING OR INEXPRESSIVE
BREATHING: NORMAL
FACIALEXPRESSION: SMILING OR INEXPRESSIVE
TOTALSCORE: 0
BODYLANGUAGE: RELAXED
CONSOLABILITY: NO NEED TO CONSOLE
TOTALSCORE: 1
BREATHING: NORMAL
TOTALSCORE: 0
NEGVOCALIZATION: OCCASIONAL MOAN/GROAN, LOW SPEECH, NEGATIVE/DISAPPROVING QUALITY
TOTALSCORE: 0
BODYLANGUAGE: RELAXED
TOTALSCORE: 0
FACIALEXPRESSION: SMILING OR INEXPRESSIVE
FACIALEXPRESSION: SMILING OR INEXPRESSIVE
BODYLANGUAGE: RELAXED
CONSOLABILITY: NO NEED TO CONSOLE
BREATHING: NORMAL
TOTALSCORE: 0
CONSOLABILITY: NO NEED TO CONSOLE
BREATHING: NORMAL
CONSOLABILITY: NO NEED TO CONSOLE
BREATHING: NORMAL
BREATHING: NORMAL
CONSOLABILITY: NO NEED TO CONSOLE
BREATHING: NORMAL
FACIALEXPRESSION: SMILING OR INEXPRESSIVE
BREATHING: NORMAL
BODYLANGUAGE: RELAXED
BODYLANGUAGE: RELAXED
CONSOLABILITY: NO NEED TO CONSOLE
CONSOLABILITY: NO NEED TO CONSOLE
BODYLANGUAGE: RELAXED
BODYLANGUAGE: RELAXED
FACIALEXPRESSION: SMILING OR INEXPRESSIVE
TOTALSCORE: 4
BODYLANGUAGE: TENSE, DISTRESSED PACING, FIDGETING
FACIALEXPRESSION: SMILING OR INEXPRESSIVE
CONSOLABILITY: NO NEED TO CONSOLE
BREATHING: NORMAL
BREATHING: NORMAL
BODYLANGUAGE: RELAXED
BREATHING: NORMAL
BODYLANGUAGE: RELAXED
BODYLANGUAGE: RELAXED
FACIALEXPRESSION: SAD, FRIGHTENED, FROWN
FACIALEXPRESSION: SMILING OR INEXPRESSIVE
CONSOLABILITY: DISTRACTED OR REASSURED BY VOICE/TOUCH
FACIALEXPRESSION: SMILING OR INEXPRESSIVE
TOTALSCORE: 0
BODYLANGUAGE: RELAXED
TOTALSCORE: 0
CONSOLABILITY: DISTRACTED OR REASSURED BY VOICE/TOUCH
CONSOLABILITY: NO NEED TO CONSOLE
TOTALSCORE: 0

## 2024-10-16 NOTE — H&P
History and Physical      Name:  Jelani Martínez /Age/Sex: 1940  (83 y.o. male)   MRN & CSN:  [id number] & [id number] Encounter Date/Time: 10/15/2024 8:32 PM EDT   Location:  [facility] PCP: Ignacio Cortez MD       Hospital Day: 1    Assessment and Plan:     Patient is a 83 y.o. male who presented with AMS     Septic Shock 2/2 PNA and SBO  Chronic Hypoxemic RF  - Presented with AMS  - Hypotensive, afebrile, leukocytosis of 16. LA 4.9 repeat increased to 11.3 after 2L IVF. On home baseline 4L NC  - CXR shows bibasilar infiltrates   - Received IVF and Zosyn in ED    - Continue pressors, titrate to MAP >65  - Linezolid and Zosyn due to MONA  - UA pending and trend lactic acid   - Follow-up cultures  - IVF    SBO  -CT of abdomen shows distal partial small bowel obstruction with the point of the obstruction probably along the right lower abdomen and could be due to an adhesion but is indeterminate  - ED discussed case with GS at sons request, who reccommended conservative management with IVF and NGT placement before surgical interventions can be considered.     -N.p.o.   -IV fluids  -Pain Control and antiemetics  -GS consult   -NGT placement       Acute metabolic Encephalopathy superimposed on chronic dementia  - Likely multifactorial from septic shock, hypotension/hypoperfusion and SBO with underlying dementia  - Delirium precautions, monitor mental status with improvement of BP and tx of shock and SBO    MONA on CKD 3a  Hyperkalemia   -Cr 4.0 on admit (baseline ~1.2), K 5.4  -Likely pre-renal 2/2 decreased PO intake, IVVD and hypotension    -IVF  -Hold lokelma due to SBO, expect hyperkalemia to improve with MONA and Cr improvement   -Avoid nephrotoxin  -Monitor I/O  -Bladder scan     Non-MI troponin elevation  - Initial Tn elevated and plateaued. ECG without acute ischemic changes.  - Likely secondary to MONA and shock.     -Tele     CVA  -Continue aspirin, statin    HTN  -Hold atenolol, amlodipine,

## 2024-10-16 NOTE — PROGRESS NOTES
4 Eyes Skin Assessment     NAME:  Jelani Martínez  YOB: 1940  MEDICAL RECORD NUMBER:  [id number]    The patient is being assessed for  Admission    I agree that at least one RN has performed a thorough Head to Toe Skin Assessment on the patient. ALL assessment sites listed below have been assessed.      Areas assessed by both nurses:    Head, Face, Ears, Shoulders, Back, Chest, Arms, Elbows, Hands, Sacrum. Buttock, Coccyx, Ischium, Legs. Feet and Heels, and Under Medical Devices         Does the Patient have a Wound? No noted wound(s)       [place] Prevention initiated by RN: Yes  Wound Care Orders initiated by RN: No    Pressure Injury (Stage 3,4, Unstageable, DTI, NWPT, and Complex wounds) if present, place Wound referral order by RN under : No    New Ostomies, if present place, Ostomy referral order under : No     Nurse 1 eSignature: Electronically signed by Tere Villalta RN on 10/16/24 at 6:45 AM EDT    **SHARE this note so that the co-signing nurse can place an eSignature**    Nurse 2 eSignature: Electronically signed by April Hernandez RN on 10/16/24 at 6:46 AM EDT

## 2024-10-16 NOTE — PROGRESS NOTES
Hospitalist Progress Note    Name:  Jelani Martínez    /Age/Sex: 1940  (83 y.o. male)  MRN & CSN:  [id number] & [id number]    PCP: Ignacio Cortez MD    Date of Admission: 10/15/2024    Patient Status:  Inpatient     Chief Complaint:   Chief Complaint   Patient presents with    Altered Mental Status     Pt was sent from nursing home to ER for altered mental status. Nursing home staff reports that pt was rolling around trying to talk to women residents in his wheelchair and not responding to staff. Pt is DNR and paperwork is at bedside.        Hospital Course:   Patient is a 83 y.o. male with a PMHx of Dementia, CVA, HTN, HLD, CKD, chronic hypoxemic RF on 4L NC who presented to the ED with AMS. History is obtained from NH staff as patient confused. Patient is a NH resident with a pertinent hx of dementia who presents due to concerns of AMS. Per NH staff, they state je was more talkative this morning with female residents and then suddenly became unresponsive. Patient presents with a DNRCC, however given patients guarded prognosis, the son, who is his POA was called to be updated in hope of making comfort care. The son has decided to rescind his DNRCC and make him a full code in hopes of clinical improvement.      Subjective:  Today is:  Hospital Day: 2.  Patient seen and examined in [id number].     He has dementia at baseline and he is confused.  He does not appear to be in pain but withdraws like pain when we try to place the pulse ox on his finger.    Not following commands.       Medications:  Reviewed    Infusion Medications    norepinephrine      sodium chloride 125 mL/hr at 10/16/24 0624    sodium chloride Stopped (10/16/24 0453)     Scheduled Medications    Polyvinyl Alcohol-Povidone PF  2 drop Both Eyes TID    dorzolamide  1 drop Both Eyes BID    And    timolol  1 drop Both Eyes BID    piperacillin-tazobactam  3,375 mg IntraVENous Q12H    [Held by provider] amLODIPine  10 mg Oral Daily  placement     Acute metabolic Encephalopathy superimposed on chronic dementia  - Likely multifactorial from septic shock, hypotension/hypoperfusion and SBO with underlying dementia  - Delirium precautions, monitor mental status with improvement of BP and tx of shock and SBO     MONA on CKD 3a  Hyperkalemia   -Cr 4.0 on admit (baseline ~1.2), K 5.4  -Likely pre-renal 2/2 decreased PO intake, IVVD and hypotension  - improving with fluids, consider renal if not much improved by tmr.   -IVF  -Hold lokelma due to SBO, expect hyperkalemia to improve with MONA and Cr improvement   -Avoid nephrotoxin  -Monitor I/O  -Bladder scan      Non-MI troponin elevation  - Initial Tn elevated and plateaued. ECG without acute ischemic changes.  - Likely secondary to MONA and shock.      -Tele      CVA  -Continue aspirin, statin     HTN  -Hold atenolol, amlodipine, lisinopril due to septic shock      HLD  -Continue statin            Drugs that require monitoring for toxicity include: Zyvox and the method of monitoring was/is CBC    DVT ppx: Heparin  GI ppx: Not Indicated  Diet: Diet NPO Exceptions are: Sips of Water with Meds  Code Status: Full Code    PT/OT Eval Status: eval and treat when appropriate     Disposition:  Patient transferred out of ICU  Has an MONA on ckd, will consider nephro consult if not imoroved by tmr appears pre-renal  Continue abx.            Margarito Watson MD  10/16/2024  6:49 PM

## 2024-10-16 NOTE — CONSULTS
[facility]     Consult                                                     Patient Name: Jelani Martínez  MRN: [id number]  YOB: 1940  Admission date: 10/15/2024 12:28 PM   Date of evaluation: 10/16/2024  Primary Care Physician: Ignacio Cortez MD  Reason for consult: Abdominal pain  History of Present Illness:    Mr. Martínez is a 83 y.o. male who presents from [facility] with altered mental status. On presentation found to be hypotensive, leukocytosis and lactic acid of 4.9 that went up to 11.3. CT showed small bowel obstruction and surgery consulted. The patient is with dementia, minimally verbal and not able to provide any relevant history. Information for this document has been supplemented with chart review and discussion with staff. Medical conditions include acute metabolic encephalopathy superimposed on chronic dementia, acute on chronic renal failure, N-STEMI, history of CVA. No known abdominal surgery. 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, stage 1 through stage 4 or unspecified chronic kidney disease, without heart failure     Benign prostatic hyperplasia without lower urinary tract symptoms     BPH (benign prostatic hyperplasia)     Carpal tunnel syndrome     Cerebral infarction (HCC)     Chronic kidney disease, stage 2 (mild)     Cognitive communication deficit     Constipation     COVID-19 12/30/2021    Diverticulosis     Dysuria     Emphysema, unspecified (HCC)     Hyperlipidemia     Hypertension     Hypokalemia     Major depression     Muscle weakness     Neuralgia and neuritis     Neuralgia and neuritis     Nicotine dependence     Peripheral vascular disease (HCC)     Polyneuropathy     Polyosteoarthritis      new bilateral basal infiltrates or atelectasis.  This could represent multifocal pneumonia or aspiration.  There is a stable calcified nodule and the left mid lung zone.  The heart and mediastinal structures appear normal.  Osteophytes are noted in the thoracic spine.     New bilateral basal infiltrates or atelectasis. This could represent multifocal pneumonia or aspiration.     CT HEAD WO CONTRAST    Result Date: 10/15/2024  EXAMINATION: CT OF THE HEAD WITHOUT CONTRAST,  10/15/2024 1:13 pm TECHNIQUE: CT of the head was performed without the administration of intravenous contrast. Automated exposure control, iterative reconstruction, and/or weight based adjustment of the mA/kV was utilized to reduce the radiation dose to as low as reasonably achievable. COMPARISON: Prior studies most recent 05/25/2024 and MRI of the brain 05/28/2024. HISTORY: ORDERING SYSTEM PROVIDED HISTORY:  AMS TECHNOLOGIST PROVIDED HISTORY: Has a \"code stroke\" or \"stroke alert\" been called?  No Reason for Exam:  AMS Decision Support Exception - unselect if not a suspected or confirmed emergency medical condition->Emergency Medical Condition (MA) Reason for Exam:  AMS; stroke like symptoms. FINDINGS: BRAIN/VENTRICLES: Patient's head is tilted toward the right in the CT gantry. Motion/streak artifact is present of the examination.  The ventricular system is enlarged but unchanged.  Again noted is prominence of ventricular system which is out of proportion as that compared to overlying prominence of cerebral sulci.  While findings may be on the basis of central atrophy, changes of underlying NPH cannot be excluded as described previously.  No evidence of mass effect or midline shift.  Prominence of sulci overlying convexities of cerebral hemispheres and cerebellum consistent with atrophy. Abnormal low attenuation within periventricular/subcortical white matter is nonspecific however in part likely on the basis of changes of ischemic

## 2024-10-17 LAB
ALBUMIN SERPL-MCNC: 2.9 G/DL (ref 3.4–5)
ALBUMIN/GLOB SERPL: 0.7 {RATIO} (ref 1.1–2.2)
ALP SERPL-CCNC: 111 U/L (ref 40–129)
ALT SERPL-CCNC: 34 U/L (ref 10–40)
ANION GAP SERPL CALCULATED.3IONS-SCNC: 15 MMOL/L (ref 3–16)
AST SERPL-CCNC: 74 U/L (ref 15–37)
BACTERIA UR CULT: NORMAL
BASOPHILS # BLD: 0 K/UL (ref 0–0.2)
BASOPHILS NFR BLD: 0.2 %
BILIRUB SERPL-MCNC: 0.6 MG/DL (ref 0–1)
BUN SERPL-MCNC: 32 MG/DL (ref 7–20)
CALCIUM SERPL-MCNC: 8.7 MG/DL (ref 8.3–10.6)
CHLORIDE SERPL-SCNC: 110 MMOL/L (ref 99–110)
CO2 SERPL-SCNC: 22 MMOL/L (ref 21–32)
CREAT SERPL-MCNC: 1.9 MG/DL (ref 0.8–1.3)
CRP SERPL-MCNC: 410 MG/L (ref 0–5.1)
DEPRECATED RDW RBC AUTO: 12.6 % (ref 12.4–15.4)
EOSINOPHIL # BLD: 0 K/UL (ref 0–0.6)
EOSINOPHIL NFR BLD: 0.1 %
ERYTHROCYTE [SEDIMENTATION RATE] IN BLOOD BY WESTERGREN METHOD: 64 MM/HR (ref 0–20)
GFR SERPLBLD CREATININE-BSD FMLA CKD-EPI: 34 ML/MIN/{1.73_M2}
GLUCOSE SERPL-MCNC: 88 MG/DL (ref 70–99)
HCT VFR BLD AUTO: 33.5 % (ref 40.5–52.5)
HGB BLD-MCNC: 11.4 G/DL (ref 13.5–17.5)
LYMPHOCYTES # BLD: 2.8 K/UL (ref 1–5.1)
LYMPHOCYTES NFR BLD: 12.6 %
MCH RBC QN AUTO: 32.4 PG (ref 26–34)
MCHC RBC AUTO-ENTMCNC: 34.1 G/DL (ref 31–36)
MCV RBC AUTO: 95.1 FL (ref 80–100)
MONOCYTES # BLD: 1.2 K/UL (ref 0–1.3)
MONOCYTES NFR BLD: 5.1 %
NEUTROPHILS # BLD: 18.4 K/UL (ref 1.7–7.7)
NEUTROPHILS NFR BLD: 82 %
PLATELET # BLD AUTO: 232 K/UL (ref 135–450)
PMV BLD AUTO: 9.2 FL (ref 5–10.5)
POTASSIUM SERPL-SCNC: 3.7 MMOL/L (ref 3.5–5.1)
PROT SERPL-MCNC: 7 G/DL (ref 6.4–8.2)
RBC # BLD AUTO: 3.53 M/UL (ref 4.2–5.9)
SODIUM SERPL-SCNC: 147 MMOL/L (ref 136–145)
WBC # BLD AUTO: 22.4 K/UL (ref 4–11)

## 2024-10-17 PROCEDURE — APPNB30 APP NON BILLABLE TIME 0-30 MINS: Performed by: NURSE PRACTITIONER

## 2024-10-17 PROCEDURE — 2060000000 HC ICU INTERMEDIATE R&B

## 2024-10-17 PROCEDURE — APPSS15 APP SPLIT SHARED TIME 0-15 MINUTES: Performed by: NURSE PRACTITIONER

## 2024-10-17 PROCEDURE — 94761 N-INVAS EAR/PLS OXIMETRY MLT: CPT

## 2024-10-17 PROCEDURE — 6360000002 HC RX W HCPCS: Performed by: NURSE PRACTITIONER

## 2024-10-17 PROCEDURE — 6360000002 HC RX W HCPCS: Performed by: STUDENT IN AN ORGANIZED HEALTH CARE EDUCATION/TRAINING PROGRAM

## 2024-10-17 PROCEDURE — 2580000003 HC RX 258: Performed by: INTERNAL MEDICINE

## 2024-10-17 PROCEDURE — 2580000003 HC RX 258: Performed by: NURSE PRACTITIONER

## 2024-10-17 PROCEDURE — 86140 C-REACTIVE PROTEIN: CPT

## 2024-10-17 PROCEDURE — 6370000000 HC RX 637 (ALT 250 FOR IP): Performed by: INTERNAL MEDICINE

## 2024-10-17 PROCEDURE — 2700000000 HC OXYGEN THERAPY PER DAY

## 2024-10-17 PROCEDURE — 99232 SBSQ HOSP IP/OBS MODERATE 35: CPT | Performed by: SURGERY

## 2024-10-17 PROCEDURE — 80053 COMPREHEN METABOLIC PANEL: CPT

## 2024-10-17 PROCEDURE — 85652 RBC SED RATE AUTOMATED: CPT

## 2024-10-17 PROCEDURE — 6360000002 HC RX W HCPCS: Performed by: INTERNAL MEDICINE

## 2024-10-17 PROCEDURE — 36415 COLL VENOUS BLD VENIPUNCTURE: CPT

## 2024-10-17 PROCEDURE — 85025 COMPLETE CBC W/AUTO DIFF WBC: CPT

## 2024-10-17 RX ADMIN — LATANOPROST 1 DROP: 50 SOLUTION OPHTHALMIC at 20:19

## 2024-10-17 RX ADMIN — DORZOLAMIDE HYDROCHLORIDE 1 DROP: 20 SOLUTION OPHTHALMIC at 20:19

## 2024-10-17 RX ADMIN — PIPERACILLIN SODIUM,TAZOBACTAM SODIUM 3375 MG: 3; .375 INJECTION, POWDER, FOR SOLUTION INTRAVENOUS at 16:25

## 2024-10-17 RX ADMIN — WATER 2.5 MG: 1 INJECTION INTRAMUSCULAR; INTRAVENOUS; SUBCUTANEOUS at 15:45

## 2024-10-17 RX ADMIN — HEPARIN SODIUM 5000 UNITS: 5000 INJECTION INTRAVENOUS; SUBCUTANEOUS at 13:34

## 2024-10-17 RX ADMIN — POLYVINYL ALCOHOL, POVIDONE 2 DROP: 14; 6 SOLUTION/ DROPS OPHTHALMIC at 13:35

## 2024-10-17 RX ADMIN — HEPARIN SODIUM 5000 UNITS: 5000 INJECTION INTRAVENOUS; SUBCUTANEOUS at 20:19

## 2024-10-17 RX ADMIN — POLYVINYL ALCOHOL, POVIDONE 2 DROP: 14; 6 SOLUTION/ DROPS OPHTHALMIC at 20:19

## 2024-10-17 RX ADMIN — BRIMONIDINE TARTRATE 1 DROP: 2 SOLUTION OPHTHALMIC at 20:18

## 2024-10-17 RX ADMIN — LINEZOLID 600 MG: 600 INJECTION, SOLUTION INTRAVENOUS at 12:32

## 2024-10-17 RX ADMIN — BRIMONIDINE TARTRATE 1 DROP: 2 SOLUTION OPHTHALMIC at 10:11

## 2024-10-17 RX ADMIN — DORZOLAMIDE HYDROCHLORIDE 1 DROP: 20 SOLUTION OPHTHALMIC at 08:45

## 2024-10-17 RX ADMIN — TIMOLOL MALEATE 1 DROP: 5 SOLUTION OPHTHALMIC at 09:32

## 2024-10-17 RX ADMIN — HEPARIN SODIUM 5000 UNITS: 5000 INJECTION INTRAVENOUS; SUBCUTANEOUS at 06:34

## 2024-10-17 RX ADMIN — LINEZOLID 600 MG: 600 INJECTION, SOLUTION INTRAVENOUS at 23:54

## 2024-10-17 RX ADMIN — PIPERACILLIN SODIUM,TAZOBACTAM SODIUM 3375 MG: 3; .375 INJECTION, POWDER, FOR SOLUTION INTRAVENOUS at 04:30

## 2024-10-17 RX ADMIN — TIMOLOL MALEATE 1 DROP: 5 SOLUTION OPHTHALMIC at 20:18

## 2024-10-17 ASSESSMENT — PAIN SCALES - PAIN ASSESSMENT IN ADVANCED DEMENTIA (PAINAD)
BREATHING: NORMAL
BREATHING: NORMAL
BODYLANGUAGE: RELAXED
FACIALEXPRESSION: SMILING OR INEXPRESSIVE
BREATHING: NORMAL
FACIALEXPRESSION: SMILING OR INEXPRESSIVE
CONSOLABILITY: NO NEED TO CONSOLE
BODYLANGUAGE: RELAXED
TOTALSCORE: 0
CONSOLABILITY: NO NEED TO CONSOLE
FACIALEXPRESSION: SMILING OR INEXPRESSIVE
BREATHING: NORMAL
TOTALSCORE: 0
BREATHING: NORMAL
CONSOLABILITY: NO NEED TO CONSOLE
BODYLANGUAGE: RELAXED
TOTALSCORE: 0
FACIALEXPRESSION: SMILING OR INEXPRESSIVE
CONSOLABILITY: NO NEED TO CONSOLE
CONSOLABILITY: NO NEED TO CONSOLE
BREATHING: NORMAL
BODYLANGUAGE: RELAXED
CONSOLABILITY: NO NEED TO CONSOLE
BREATHING: NORMAL
TOTALSCORE: 0
FACIALEXPRESSION: SMILING OR INEXPRESSIVE
BODYLANGUAGE: RELAXED
FACIALEXPRESSION: SMILING OR INEXPRESSIVE
BREATHING: NORMAL
BODYLANGUAGE: RELAXED
CONSOLABILITY: NO NEED TO CONSOLE
TOTALSCORE: 0
FACIALEXPRESSION: SMILING OR INEXPRESSIVE
CONSOLABILITY: NO NEED TO CONSOLE
TOTALSCORE: 0
TOTALSCORE: 0
FACIALEXPRESSION: SMILING OR INEXPRESSIVE
BREATHING: NORMAL
FACIALEXPRESSION: SMILING OR INEXPRESSIVE
CONSOLABILITY: NO NEED TO CONSOLE
TOTALSCORE: 0
CONSOLABILITY: NO NEED TO CONSOLE
BREATHING: NORMAL
FACIALEXPRESSION: SMILING OR INEXPRESSIVE

## 2024-10-17 ASSESSMENT — PAIN SCALES - WONG BAKER: WONGBAKER_NUMERICALRESPONSE: NO HURT

## 2024-10-17 NOTE — PROGRESS NOTES
[facility]    10/17/2024 12:24 PM        Name: Jelani Martínez .              Admitted: 10/15/2024  Primary Care Provider: Ignacio Cortez MD (Tel: [phone])      Hospital course: 82 yo male presented from nursing home with altered mental status. He was reported to be talkative that morning then noted to have periods of decreased responsiveness. He was hypotensive on presentation. Admitted with SBO, sepsis and acute renal failure.He was initially admitted to ICU with pressor support, transferred out of unit 10/16.      Subjective:  Unable to obtain secondary dementia. Resting in bed, opens eyes to verbal stimuli but does not speak. Becomes agitated when nursing re-positions or checks BP.     Reviewed interval ancillary notes    Current Medications  HYDROmorphone HCl PF (DILAUDID) injection 0.5 mg, Q4H PRN  Polyvinyl Alcohol-Povidone PF (REFRESH) 1.4-0.6 % ophthalmic solution 2 drop, TID  dorzolamide (TRUSOPT) 2 % ophthalmic solution 1 drop, BID   And  timolol (TIMOPTIC) 0.5 % ophthalmic solution 1 drop, BID  piperacillin-tazobactam (ZOSYN) 3,375 mg in sodium chloride 0.9 % 50 mL IVPB (mini-bag), Q12H  norepinephrine (LEVOPHED) 16 mg in sodium chloride 0.9 % 250 mL infusion, Continuous  phenol 1.4 % mouth spray 1 spray, Q2H PRN  [Held by provider] amLODIPine (NORVASC) tablet 10 mg, Daily  aspirin chewable tablet 81 mg, Daily  [Held by provider] atenolol (TENORMIN) tablet 100 mg, Daily  atorvastatin (LIPITOR) tablet 80 mg, Nightly  brimonidine (ALPHAGAN) 0.2 % ophthalmic solution 1 drop, BID  latanoprost (XALATAN) 0.005 % ophthalmic solution 1 drop, Nightly  [Held by provider] lisinopril (PRINIVIL;ZESTRIL) tablet 20 mg, Daily  melatonin tablet 9 mg, Nightly PRN  mirtazapine (REMERON) tablet 7.5 mg, Nightly  sodium chloride flush 0.9 % injection 5-40 mL, 2 times per day  sodium chloride flush 0.9 % injection 5-40    Discussed care with melinda Galvan. Normally patient is conversant, ambulatory, able to recognize family members.      Diet: Diet NPO Exceptions are: Sips of Water with Meds  Code:Full Code  DVT PPX: heparin      LOLITA Goode - CNP   10/17/2024 12:24 PM

## 2024-10-17 NOTE — SIGNIFICANT EVENT
Contacted by GIO Chacon for re-consult. Per Oswaldo - IM attending Dr Watson re-consulted general surgery due to not seeing a note in the chart. No phone number provided by Oswaldo for Dr Watson for attempted direct physician to physician conversation.    There is a complete consult note written by Dr Sullivan at 7:06 pm on 10/16/24.     I attempted to contact Dr Watson via [employer], no response. Contacted covering IM - Dr Joshi.    Call general surgery service if additional concerns.

## 2024-10-17 NOTE — PLAN OF CARE
Problem: Safety - Medical Restraint  Goal: Remains free of injury from restraints (Restraint for Interference with Medical Device)  Description: INTERVENTIONS:  1. Determine that other, less restrictive measures have been tried or would not be effective before applying the restraint  2. Evaluate the patient's condition at the time of restraint application  3. Inform patient/family regarding the reason for restraint  4. Q2H: Monitor safety, psychosocial status, comfort, nutrition and hydration  10/17/2024 1124 by Sabiha Espinoza RN  Outcome: Progressing  10/17/2024 0105 by Sofi Wolf RN  Outcome: Progressing  Flowsheets (Taken 10/17/2024 0105)  Remains free of injury from restraints (restraint for interference with medical device):   Determine that other, less restrictive measures have been tried or would not be effective before applying the restraint   Evaluate the patient's condition at the time of restraint application   Every 2 hours: Monitor safety, psychosocial status, comfort, nutrition and hydration     Problem: Discharge Planning  Goal: Discharge to home or other facility with appropriate resources  10/17/2024 1124 by Sabiha Espinoza RN  Outcome: Progressing  Flowsheets (Taken 10/17/2024 0710)  Discharge to home or other facility with appropriate resources: Refer to discharge planning if patient needs post-hospital services based on physician order or complex needs related to functional status, cognitive ability or social support system  10/17/2024 0105 by Sofi Wolf, RN  Outcome: Progressing  Flowsheets (Taken 10/17/2024 0105)  Discharge to home or other facility with appropriate resources:   Identify barriers to discharge with patient and caregiver   Refer to discharge planning if patient needs post-hospital services based on physician order or complex needs related to functional status, cognitive ability or social support system   Identify discharge learning needs (meds, wound care, etc)    Arrange for needed discharge resources and transportation as appropriate     Problem: Pain  Goal: Verbalizes/displays adequate comfort level or baseline comfort level  10/17/2024 1124 by Sabiha Espinoza RN  Outcome: Progressing  10/17/2024 0105 by Sofi Wolf RN  Outcome: Progressing  Flowsheets (Taken 10/17/2024 0105)  Verbalizes/displays adequate comfort level or baseline comfort level:   Encourage patient to monitor pain and request assistance   Assess pain using appropriate pain scale   Administer analgesics based on type and severity of pain and evaluate response   Implement non-pharmacological measures as appropriate and evaluate response     Problem: Safety - Adult  Goal: Free from fall injury  10/17/2024 1124 by Sabiha Espinoza RN  Outcome: Progressing  10/17/2024 0105 by Sofi Wolf RN  Outcome: Progressing  Flowsheets (Taken 10/17/2024 0105)  Free From Fall Injury:   Instruct family/caregiver on patient safety   Based on caregiver fall risk screen, instruct family/caregiver to ask for assistance with transferring infant if caregiver noted to have fall risk factors     Problem: Skin/Tissue Integrity  Goal: Absence of new skin breakdown  Description: 1.  Monitor for areas of redness and/or skin breakdown  2.  Assess vascular access sites hourly  3.  Every 4-6 hours minimum:  Change oxygen saturation probe site  4.  Every 4-6 hours:  If on nasal continuous positive airway pressure, respiratory therapy assess nares and determine need for appliance change or resting period.  10/17/2024 1124 by Sabiha Espinoza RN  Outcome: Progressing  10/17/2024 0105 by Sofi Wolf RN  Outcome: Progressing

## 2024-10-17 NOTE — CONSULTS
MD Alex Stern MD Aldo Estella,               Office: [phone]                      Fax: [phone]               [employer]                     Nephrology consult received. Full consult report will follow.   Thank you for allowing us to participate in this patient's care. Please do not hesitate to contact us anytime. We will follow along with you.       Alex Perla MD  [facility]   [phone] or [address].

## 2024-10-17 NOTE — PLAN OF CARE
Problem: Safety - Medical Restraint  Goal: Remains free of injury from restraints (Restraint for Interference with Medical Device)  Description: INTERVENTIONS:  1. Determine that other, less restrictive measures have been tried or would not be effective before applying the restraint  2. Evaluate the patient's condition at the time of restraint application  3. Inform patient/family regarding the reason for restraint  4. Q2H: Monitor safety, psychosocial status, comfort, nutrition and hydration  Outcome: Progressing  Flowsheets (Taken 10/17/2024 0105)  Remains free of injury from restraints (restraint for interference with medical device):   Determine that other, less restrictive measures have been tried or would not be effective before applying the restraint   Evaluate the patient's condition at the time of restraint application   Every 2 hours: Monitor safety, psychosocial status, comfort, nutrition and hydration     Problem: Discharge Planning  Goal: Discharge to home or other facility with appropriate resources  Outcome: Progressing  Flowsheets (Taken 10/17/2024 0105)  Discharge to home or other facility with appropriate resources:   Identify barriers to discharge with patient and caregiver   Refer to discharge planning if patient needs post-hospital services based on physician order or complex needs related to functional status, cognitive ability or social support system   Identify discharge learning needs (meds, wound care, etc)   Arrange for needed discharge resources and transportation as appropriate     Problem: Pain  Goal: Verbalizes/displays adequate comfort level or baseline comfort level  Outcome: Progressing  Flowsheets (Taken 10/17/2024 0105)  Verbalizes/displays adequate comfort level or baseline comfort level:   Encourage patient to monitor pain and request assistance   Assess pain using appropriate pain scale   Administer analgesics based on type and severity of pain and evaluate response

## 2024-10-17 NOTE — CONSULTS
MD Alex Stern MD Aldo Estella,                  Office: [phone]                      Fax: [phone]             [employer]                     NEPHROLOGY INITIAL CONSULT NOTE:     PATIENT NAME: Jelani Martínez  : 1940  MRN: [id number]  REASON FOR CONSULT: For evaluation and management of Acute Kidney Injury . (My recommendations will be communicated by way of shared medical record.)  Ordered On  Ordered By   10/17/2024  2:26 PM Haily Brandt, APRN - CNP       RECOMMENDATIONS:   With poor access, unable to get labs, okay to get PICC or midline, and dominant arm, if needed from renal standpoint    Fup w/ palliative care for GOC     Check CK level while on statin     cc/hr  Monitor mild hypernatremia with n.p.o.,    Follow-up urine culture  Antibiotic per primary and pharmacy team    Hold lisinopril    Monitor urine output with external urinary catheter  no need for dialysis,    at higher risk for decompensation, needing closer monitoring.    D/C plan from renal stand point:- likely ~1-2 days more       Thank you for allowing me to participate in this patient's care. Please do not hesitate to contact me anytime. We will follow along with you.       Alex Perla MD,  [facility]  Office: [phone] or [employer]  Fax: [phone]        IMPRESSION:       Admitted on:  10/15/2024 12:28 PM   For:    Hospital Problems             Last Modified POA    * (Principal) Septic shock (HCC) 10/15/2024 Yes         MONA : On admission  H/O CKD: stage 3A   - BL S.Cr.: 1.2 ,  BL eGFR 40-50s, last known 2024     - on admission S.Cr.: 4.0  - NOW Estimated Creatinine Clearance: 25 mL/min (A) (based on SCr of 1.9 mg/dL (H)).     - Etiology of MONA -likely prerenal in, with improvement w/ supportive care  - UA : results reviewed: On 10-6-2024, multiple abnormalities most likely urinary tract infection  - Renal imaging: results reviewed:  along the right inguinal region which is unchanged.  There are no bowel loops in the area.  The appendix is normal with no pericecal inflammation. Peritoneum/Retroperitoneum:   The aorta is normal caliber with no aneurysm and no retroperitoneal mass or adenopathy is seen. Bones/Soft Tissues:   There are moderate degenerative changes throughout the spine with no aggressive osseous lesion.     Findings in keeping with a distal partial small bowel obstruction with the point of the obstruction probably along the right lower abdomen and could be due to an adhesion but is indeterminate.  Recommend surgical follow-up. Moderate irregular subsegmental parenchymal infiltrate left lower lobe extending superiorly which could represent pneumonia or less likely a mass lesion.  Recommend follow-up until resolution. Postop changes along the right upper chest from previous partial pneumonectomy with hazy ground-glass infiltrates along the right lower lobe posteriorly which could represent early pneumonia.  Recommend follow-up. 3 cm irregular parenchymal opacity left upper lobe which is partially calcified probably represents post inflammatory scarring vs old granulomatous disease or less likely any type of active process in the area.  Recommend follow-up to assure long-term stability Mild constipation and a normal appendix. Mild hydrops of the gallbladder with no gallstones or wall thickening. Probable inguinal hernias bilaterally which is more prominent on the left and are unchanged Mild prostatic enlargement with no pelvic mass or active inflammation. Mild compression fracture of T9 which may be chronic.  Recommend clinical follow-up.     XR CHEST PORTABLE    Result Date: 10/15/2024  EXAMINATION: ONE XRAY VIEW OF THE CHEST 10/15/2024 1:39 pm COMPARISON: 08/14/2024 HISTORY: ORDERING SYSTEM PROVIDED HISTORY: AMS TECHNOLOGIST PROVIDED HISTORY: Reason for exam:->AMS Reason for Exam: Altered Mental Status (Pt was sent from nursing home

## 2024-10-17 NOTE — PROGRESS NOTES
[facility]        Progress Note    Patient Name: Jelani Martínez  MRN: [id number]  YOB: 1940  Date of Evaluation: 10/17/2024    Chief Complaint: Altered mental status, abdominal pain    Subjective:  No acute events overnight  No acute distress  Patient does not communicate/answer questions  No reports of vomiting with NGT in place, low output reported  RN reports large stool  Resting in bed at this time      Vital Signs:  Patient Vitals for the past 24 hrs:   BP Temp Temp src Pulse Resp SpO2 Weight   10/17/24 0710 (!) 157/76 99.6 °F (37.6 °C) Axillary (!) 109 16 96 % --   10/17/24 0630 -- -- -- -- -- -- 70.8 kg (156 lb 1.6 oz)   10/17/24 0430 (!) 156/77 98.7 °F (37.1 °C) Axillary (!) 102 20 98 % --   10/16/24 2352 (!) 168/71 98.6 °F (37 °C) Axillary (!) 101 20 -- --   10/16/24 2102 -- -- -- -- -- 98 % --   10/16/24 2039 (!) 148/81 98.8 °F (37.1 °C) Axillary (!) 102 20 98 % --   10/16/24 1630 (!) 131/56 100 °F (37.8 °C) Oral (!) 105 20 96 % --      TEMPERATURE HISTORY 24H: Temp (24hrs), Av.1 °F (37.3 °C), Min:98.6 °F (37 °C), Max:100 °F (37.8 °C)    BLOOD PRESSURE HISTORY: Systolic (36hrs), Av , Min:131 , Max:168    Diastolic (36hrs), Av, Min:52, Max:114      Intake/Output:  I/O last 3 completed shifts:  In: 1334.9 [I.V.:959; IV Piggyback:375.8]  Out: 700 [Urine:700]  No intake/output data recorded.  Drain/tube Output:       Physical Exam:  General: awake, drowsy, no acute distress, does not answer questions  Cardiovascular:  tachycardic with regular rhythm and no murmur noted  Lungs: clear to auscultation  Abdomen: soft, mildly distended, mild poorly localized tenderness but difficult to fully assess as patient is not cooperative with exam/combative, bowel sounds present     Labs:  CBC:    Recent Labs     10/15/24  1347 10/16/24  0500 10/17/24  1433   WBC 16.2* 14.2* 22.4*   HGB 16.1 11.6* 11.4*   HCT 49.2 33.8* 33.5*    264 232     BMP:  IV hydration; monitor and correct electrolytes  4. Antibiotics per primary team  5. Activity as tolerated  6. PRN analgesics and antiemetics--minimizing narcotics as tolerated  7. Management of medical comorbid etiologies per primary team and consulting services  8. Disposition: Recommend palliative care consultation for help determine goals of care    EDUCATION:  Educated patient on plan of care and disease process--all questions answered.    Plans discussed with patient and nursing.  Reviewed and discussed with Dr. Sullivan.      Signed:  Mariam Stahl, LOLITA - CNP  10/17/2024 3:13 PM    I have personally performed a face to face diagnostic evaluation on this patient. I have interviewed and examined the patient and I agree with the assessment above. Time was spent including but not limited to reviewing patient chart (reviewing patient chart, current encounter and prior encounter notes procedures and interventions, labs, imaging and other testing), interviewing and counseling patient and present family members, performing physical exam, documentation of my findings and subsequent follow up of ordered medication and testing, placing referrals and communication with patient care providers, coordinating future care as well as documentation in the EHR. This encompassed more than 50% of the total encounter time. In summary, my findings and plan are the following:   Mr. Martínez is a 83 y.o. male:  Assessment:  Small bowel obstruction  Multifocal pneumonia     Plan:  The patient has a small bowel obstruction that is likely from adhesive disease but with unclear if any abdominal surgical history. Anticipate will respond to conservative measures but if he does not respond to conservative management or clinically deteriorates, may need surgical exploration  Will order small bowel follow through which may be both diagnostic and therapeutic tomorrow  Patient pulled NG, continue bowel rest though  Monitor bowel function, passed

## 2024-10-17 NOTE — CONSULTS
PALLIATIVE MEDICINE CONSULTATION     Patient name:Jelani Martínez   MRN:[id number]    :1940  Room/Bed:[facility]3369/3369-01   LOS: 2 days         Date of consult:10/17/2024    Inpatient consult to Palliative Care  Consult performed by: Amparo Chavez APRN - CNP  Consult ordered by: Haily Brandt APRN - CNP  Reason for consult: GOC and code status              ASSESSMENT/RECOMMENDATIONS     83 y.o. male with AMS and SBO with underlying dementia       Symptom Management:  AMS- pt has dementia is wheelchair bound and lives in a memory care unit at baseline today he is minimally responsive with no attempts at verbal response  SBO- surgery following pt had BM this am    Goals of Care- pt is non-verbal at present he has a signed DNRCC signed by himself and MD at facility from . According to chart review melinda Galvan rescinded that and made pt FULL code on admission. Left message for son to discuss what GOC for his dad are at present    Patient/Family Goals of Care :    pt is non-verbal at present he has a signed DNRCC signed by himself and MD at facility from . According to chart review melinda Galvan rescinded that and made pt FULL code on admission. Left message for son to discuss what GOC for his dad are at present    Disposition/Discharge Plan:   An outpatient [facility] referral was ordered for post-discharge to followup with the patient once they return home.    Advance Directives:    The patient has appointed the following active healthcare agents:    Primary Decision Maker: Cole Martínez - Child - [phone]    Secondary Decision Maker: Mamta Deleon - Child - [phone]    The Patient has the following current code status:    Code Status: Full Code      Interactive exchange regarding medications,tests and procedures with: patient, floor RN, Haily MCHUGH  Thank you for allowing us to participate in the care of this patient.      HISTORY     CC: AMS  HPI: The

## 2024-10-18 ENCOUNTER — APPOINTMENT (OUTPATIENT)
Dept: GENERAL RADIOLOGY | Age: 84
DRG: 871 | End: 2024-10-18
Payer: MEDICARE

## 2024-10-18 PROBLEM — E66.3 OVERWEIGHT (BMI 25.0-29.9): Status: ACTIVE | Noted: 2024-10-18

## 2024-10-18 PROBLEM — R79.82 CRP ELEVATED: Status: ACTIVE | Noted: 2024-10-18

## 2024-10-18 PROBLEM — R65.20 SEVERE SEPSIS (HCC): Status: ACTIVE | Noted: 2024-08-14

## 2024-10-18 PROBLEM — R70.0 ELEVATED SED RATE: Status: ACTIVE | Noted: 2024-10-18

## 2024-10-18 PROBLEM — K56.609 SBO (SMALL BOWEL OBSTRUCTION) (HCC): Status: ACTIVE | Noted: 2024-10-18

## 2024-10-18 PROBLEM — Z86.59 HISTORY OF DEPRESSION: Status: ACTIVE | Noted: 2024-10-18

## 2024-10-18 PROBLEM — G62.9 POLYNEUROPATHY: Status: ACTIVE | Noted: 2024-10-18

## 2024-10-18 PROBLEM — E78.2 MIXED HYPERLIPIDEMIA: Status: ACTIVE | Noted: 2024-10-18

## 2024-10-18 LAB
EKG ATRIAL RATE: 74 BPM
EKG DIAGNOSIS: NORMAL
EKG P AXIS: 83 DEGREES
EKG P-R INTERVAL: 174 MS
EKG Q-T INTERVAL: 434 MS
EKG QRS DURATION: 124 MS
EKG QTC CALCULATION (BAZETT): 481 MS
EKG R AXIS: 34 DEGREES
EKG T AXIS: 48 DEGREES
EKG VENTRICULAR RATE: 74 BPM

## 2024-10-18 PROCEDURE — 6360000002 HC RX W HCPCS: Performed by: NURSE PRACTITIONER

## 2024-10-18 PROCEDURE — 99223 1ST HOSP IP/OBS HIGH 75: CPT | Performed by: INTERNAL MEDICINE

## 2024-10-18 PROCEDURE — APPSS15 APP SPLIT SHARED TIME 0-15 MINUTES: Performed by: NURSE PRACTITIONER

## 2024-10-18 PROCEDURE — 6360000002 HC RX W HCPCS: Performed by: STUDENT IN AN ORGANIZED HEALTH CARE EDUCATION/TRAINING PROGRAM

## 2024-10-18 PROCEDURE — 2580000003 HC RX 258: Performed by: INTERNAL MEDICINE

## 2024-10-18 PROCEDURE — 2580000003 HC RX 258: Performed by: STUDENT IN AN ORGANIZED HEALTH CARE EDUCATION/TRAINING PROGRAM

## 2024-10-18 PROCEDURE — 6360000002 HC RX W HCPCS: Performed by: INTERNAL MEDICINE

## 2024-10-18 PROCEDURE — 99232 SBSQ HOSP IP/OBS MODERATE 35: CPT | Performed by: SURGERY

## 2024-10-18 PROCEDURE — 6370000000 HC RX 637 (ALT 250 FOR IP): Performed by: STUDENT IN AN ORGANIZED HEALTH CARE EDUCATION/TRAINING PROGRAM

## 2024-10-18 PROCEDURE — 2060000000 HC ICU INTERMEDIATE R&B

## 2024-10-18 PROCEDURE — 74019 RADEX ABDOMEN 2 VIEWS: CPT

## 2024-10-18 PROCEDURE — 2580000003 HC RX 258: Performed by: NURSE PRACTITIONER

## 2024-10-18 PROCEDURE — 94761 N-INVAS EAR/PLS OXIMETRY MLT: CPT

## 2024-10-18 PROCEDURE — APPNB30 APP NON BILLABLE TIME 0-30 MINS: Performed by: NURSE PRACTITIONER

## 2024-10-18 PROCEDURE — 93010 ELECTROCARDIOGRAM REPORT: CPT | Performed by: INTERNAL MEDICINE

## 2024-10-18 PROCEDURE — 2700000000 HC OXYGEN THERAPY PER DAY

## 2024-10-18 RX ORDER — HYDRALAZINE HYDROCHLORIDE 20 MG/ML
5 INJECTION INTRAMUSCULAR; INTRAVENOUS EVERY 4 HOURS PRN
Status: DISCONTINUED | OUTPATIENT
Start: 2024-10-18 | End: 2024-10-22 | Stop reason: HOSPADM

## 2024-10-18 RX ORDER — LABETALOL HYDROCHLORIDE 5 MG/ML
5 INJECTION, SOLUTION INTRAVENOUS
Status: COMPLETED | OUTPATIENT
Start: 2024-10-18 | End: 2024-10-18

## 2024-10-18 RX ORDER — SODIUM CHLORIDE 9 MG/ML
INJECTION, SOLUTION INTRAVENOUS CONTINUOUS
Status: DISCONTINUED | OUTPATIENT
Start: 2024-10-18 | End: 2024-10-19

## 2024-10-18 RX ORDER — LABETALOL HYDROCHLORIDE 5 MG/ML
5 INJECTION, SOLUTION INTRAVENOUS EVERY 4 HOURS PRN
Status: DISCONTINUED | OUTPATIENT
Start: 2024-10-18 | End: 2024-10-22 | Stop reason: HOSPADM

## 2024-10-18 RX ORDER — LABETALOL HYDROCHLORIDE 5 MG/ML
5 INJECTION, SOLUTION INTRAVENOUS EVERY 6 HOURS
Status: DISCONTINUED | OUTPATIENT
Start: 2024-10-18 | End: 2024-10-22 | Stop reason: HOSPADM

## 2024-10-18 RX ADMIN — DORZOLAMIDE HYDROCHLORIDE 1 DROP: 20 SOLUTION OPHTHALMIC at 09:41

## 2024-10-18 RX ADMIN — HEPARIN SODIUM 5000 UNITS: 5000 INJECTION INTRAVENOUS; SUBCUTANEOUS at 13:27

## 2024-10-18 RX ADMIN — LABETALOL HYDROCHLORIDE 5 MG: 5 INJECTION, SOLUTION INTRAVENOUS at 15:17

## 2024-10-18 RX ADMIN — LINEZOLID 600 MG: 600 INJECTION, SOLUTION INTRAVENOUS at 13:27

## 2024-10-18 RX ADMIN — LABETALOL HYDROCHLORIDE 5 MG: 5 INJECTION, SOLUTION INTRAVENOUS at 20:33

## 2024-10-18 RX ADMIN — MICAFUNGIN SODIUM 100 MG: 100 INJECTION, POWDER, LYOPHILIZED, FOR SOLUTION INTRAVENOUS at 16:48

## 2024-10-18 RX ADMIN — LABETALOL HYDROCHLORIDE 5 MG: 5 INJECTION, SOLUTION INTRAVENOUS at 03:04

## 2024-10-18 RX ADMIN — PIPERACILLIN SODIUM,TAZOBACTAM SODIUM 3375 MG: 3; .375 INJECTION, POWDER, FOR SOLUTION INTRAVENOUS at 03:04

## 2024-10-18 RX ADMIN — BRIMONIDINE TARTRATE 1 DROP: 2 SOLUTION OPHTHALMIC at 09:41

## 2024-10-18 RX ADMIN — HEPARIN SODIUM 5000 UNITS: 5000 INJECTION INTRAVENOUS; SUBCUTANEOUS at 04:44

## 2024-10-18 RX ADMIN — WATER 2.5 MG: 1 INJECTION INTRAMUSCULAR; INTRAVENOUS; SUBCUTANEOUS at 09:34

## 2024-10-18 RX ADMIN — TIMOLOL MALEATE 1 DROP: 5 SOLUTION OPHTHALMIC at 09:41

## 2024-10-18 RX ADMIN — HEPARIN SODIUM 5000 UNITS: 5000 INJECTION INTRAVENOUS; SUBCUTANEOUS at 22:16

## 2024-10-18 RX ADMIN — MEROPENEM 1000 MG: 1 INJECTION INTRAVENOUS at 22:15

## 2024-10-18 RX ADMIN — ACETAMINOPHEN 650 MG: 650 SUPPOSITORY RECTAL at 22:38

## 2024-10-18 RX ADMIN — ACETAMINOPHEN 650 MG: 650 SUPPOSITORY RECTAL at 04:44

## 2024-10-18 RX ADMIN — SODIUM CHLORIDE, PRESERVATIVE FREE 10 ML: 5 INJECTION INTRAVENOUS at 22:27

## 2024-10-18 RX ADMIN — MEROPENEM 1000 MG: 1 INJECTION INTRAVENOUS at 09:34

## 2024-10-18 RX ADMIN — ACETAMINOPHEN 650 MG: 650 SUPPOSITORY RECTAL at 13:23

## 2024-10-18 RX ADMIN — SODIUM CHLORIDE: 9 INJECTION, SOLUTION INTRAVENOUS at 09:32

## 2024-10-18 ASSESSMENT — PAIN SCALES - PAIN ASSESSMENT IN ADVANCED DEMENTIA (PAINAD)
BREATHING: NORMAL
BODYLANGUAGE: RELAXED
BODYLANGUAGE: RELAXED
BREATHING: NORMAL
BREATHING: NORMAL
FACIALEXPRESSION: SMILING OR INEXPRESSIVE
TOTALSCORE: 0
TOTALSCORE: 0
BODYLANGUAGE: RELAXED
CONSOLABILITY: NO NEED TO CONSOLE
BODYLANGUAGE: RELAXED
CONSOLABILITY: NO NEED TO CONSOLE
TOTALSCORE: 0
CONSOLABILITY: NO NEED TO CONSOLE
BODYLANGUAGE: RELAXED
CONSOLABILITY: NO NEED TO CONSOLE
BODYLANGUAGE: RELAXED
CONSOLABILITY: NO NEED TO CONSOLE
FACIALEXPRESSION: SMILING OR INEXPRESSIVE
FACIALEXPRESSION: SMILING OR INEXPRESSIVE
BREATHING: NORMAL
CONSOLABILITY: NO NEED TO CONSOLE
FACIALEXPRESSION: SMILING OR INEXPRESSIVE
FACIALEXPRESSION: SMILING OR INEXPRESSIVE
TOTALSCORE: 0
TOTALSCORE: 0
BREATHING: NORMAL
TOTALSCORE: 0
BREATHING: NORMAL
FACIALEXPRESSION: SMILING OR INEXPRESSIVE

## 2024-10-18 ASSESSMENT — PAIN SCALES - GENERAL: PAINLEVEL_OUTOF10: 0

## 2024-10-18 NOTE — PLAN OF CARE
Problem: Safety - Medical Restraint  Goal: Remains free of injury from restraints (Restraint for Interference with Medical Device)  Description: INTERVENTIONS:  1. Determine that other, less restrictive measures have been tried or would not be effective before applying the restraint  2. Evaluate the patient's condition at the time of restraint application  3. Inform patient/family regarding the reason for restraint  4. Q2H: Monitor safety, psychosocial status, comfort, nutrition and hydration  10/18/2024 1434 by Noy Murrell RN  Outcome: Progressing     Problem: Discharge Planning  Goal: Discharge to home or other facility with appropriate resources  10/18/2024 1434 by Noy Murrell RN  Outcome: Progressing     Problem: Pain  Goal: Verbalizes/displays adequate comfort level or baseline comfort level  10/18/2024 1434 by Noy Murrell RN  Outcome: Progressing     Problem: Safety - Adult  Goal: Free from fall injury  10/18/2024 1434 by Noy Murrell RN  Outcome: Progressing     Problem: Skin/Tissue Integrity  Goal: Absence of new skin breakdown  Description: 1.  Monitor for areas of redness and/or skin breakdown  2.  Assess vascular access sites hourly  3.  Every 4-6 hours minimum:  Change oxygen saturation probe site  4.  Every 4-6 hours:  If on nasal continuous positive airway pressure, respiratory therapy assess nares and determine need for appliance change or resting period.  10/18/2024 1434 by Noy Murrell RN  Outcome: Progressing     Problem: ABCDS Injury Assessment  Goal: Absence of physical injury  10/18/2024 1434 by Noy Murrell RN  Outcome: Progressing     Problem: Safety - Medical Restraint  Goal: Remains free of injury from restraints (Restraint for Interference with Medical Device)  Description: INTERVENTIONS:  1. Determine that other, less restrictive measures have been tried or would not be effective before applying the restraint  2. Evaluate the patient's condition at the time of  restraint application  3. Inform patient/family regarding the reason for restraint  4. Q2H: Monitor safety, psychosocial status, comfort, nutrition and hydration  10/18/2024 1434 by Noy Murrell RN  Outcome: Progressing  10/18/2024 1434 by Noy Murrell RN  Outcome: Not Progressing     Problem: Discharge Planning  Goal: Discharge to home or other facility with appropriate resources  10/18/2024 1434 by Noy Murrell RN  Outcome: Progressing  10/18/2024 1434 by Noy Murrell RN  Outcome: Not Progressing     Problem: Pain  Goal: Verbalizes/displays adequate comfort level or baseline comfort level  10/18/2024 1434 by Noy Murrell RN  Outcome: Progressing  10/18/2024 1434 by Noy Murrell RN  Outcome: Not Progressing     Problem: Safety - Adult  Goal: Free from fall injury  10/18/2024 1434 by Noy Murrell RN  Outcome: Progressing  10/18/2024 1434 by Noy Murrell RN  Outcome: Not Progressing     Problem: Skin/Tissue Integrity  Goal: Absence of new skin breakdown  Description: 1.  Monitor for areas of redness and/or skin breakdown  2.  Assess vascular access sites hourly  3.  Every 4-6 hours minimum:  Change oxygen saturation probe site  4.  Every 4-6 hours:  If on nasal continuous positive airway pressure, respiratory therapy assess nares and determine need for appliance change or resting period.  10/18/2024 1434 by Noy Murrell RN  Outcome: Progressing  10/18/2024 1434 by Noy Murrell RN  Outcome: Not Progressing     Problem: ABCDS Injury Assessment  Goal: Absence of physical injury  10/18/2024 1434 by Noy Murrell RN  Outcome: Progressing  10/18/2024 1434 by Noy Murrell RN  Outcome: Not Progressing

## 2024-10-18 NOTE — CONSULTS
Infectious Diseases   Consult Note        Admission Date: 10/15/2024  Hospital Day: Hospital Day: 4   Attending: Jesika Mukherjee MD  Date of service: 10/18/24     Reason for admission: SBO (small bowel obstruction) ([facility]) [K56.609]  Septic shock ([facility]) [A41.9, R65.21]  Acute renal failure, unspecified acute renal failure type ([facility]) [N17.9]  Altered mental status, unspecified altered mental status type [R41.82]    Chief complaint/ Reason for consult: Severe sepsis    Microbiology:        I have reviewed allavailable micro lab data and cultures    Results       Procedure Component Value Units Date/Time    Culture, Blood 2 [[id number]]     Order Status: Sent Specimen: Blood     Culture, Blood 1 [[id number]]     Order Status: Sent Specimen: Blood     Culture, Urine [[id number]] Collected: 10/16/24 0504    Order Status: Completed Specimen: Urine, clean catch Updated: 10/17/24 1108     Urine Culture, Routine No growth at 18 to 36 hours    Narrative:      ORDER#: [id number]                          ORDERED BY: DOYLE PECK  SOURCE: Urine Clean Catch  Urine Clean CatcCOLLECTED:  10/16/24 05:04  ANTIBIOTICS AT [facility].:                      RECEIVED :  10/16/24 14:09    Culture, MRSA, Screening [[id number]] Collected: 10/16/24 0156    Order Status: Completed Specimen: Nares Updated: 10/17/24 2321     MRSA Culture Only Further report to follow    Narrative:      ORDER#: [id number]                          ORDERED BY: MARQUITA PALMER  SOURCE: Nares                              COLLECTED:  10/16/24 01:56  ANTIBIOTICS AT [facility].:                      RECEIVED :  10/16/24 14:09    COVID-19 & Influenza Combo [[id number]] Collected: 10/16/24 0156    Order Status: Completed Specimen: Nasopharyngeal Swab Updated: 10/16/24 0239     SARS-CoV-2 RNA, RT PCR NOT DETECTED     Comment: Not Detected results do not preclude SARS-CoV-2 infection and  should not be used as the sole basis for patient management  decisions.  Results must be  follow. If you have any additional questions, please do not hesitate to contact me.    Subjective:     Presenting complaint in ER:     Chief Complaint   Patient presents with    Altered Mental Status     Pt was sent from nursing home to ER for altered mental status. Nursing home staff reports that pt was rolling around trying to talk to women residents in his wheelchair and not responding to staff. Pt is DNR and paperwork is at bedside.         HPI: Jelani Martínez is a 83 y.o. male patient, who was seen at the request of Dr. Mukherjee, Jesika Finn MD.    History was obtained from chart review and rn    The patient was admitted on 10/15/2024. I have been consulted to see the patient for above mentioned reason(s).    The patient has multiple medical comorbidities, and presented to the ER for concern for altered mental status.  The patient is a resident of nursing facility.  The patient came increasingly confused at the nursing home.  His symptoms started rather quickly.    Acute disorder, worsening in nature with no aggravating or leaving factors for the patient has some chronic dementia issues.    The patient was brought to the ER.  He was hospitalized.  He was checked for COVID-19 influenza twice and was negative.  His white cell count was 16,200 on admission.    The patient was started on IV linezolid and IV Zosyn on admission.  Urine culture 1 send a blood culture was sent out on 10/16/2024, which is in process.    The patient spiked a high fever of 100.5 earlier this morning and the white cell count has gone up to 22,400.    I have been asked for my opinion for management for this patient.                   Past Medical History: All past medical history reviewed today.    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

## 2024-10-18 NOTE — PROGRESS NOTES
[facility]        Progress Note    Patient Name: Jelani Martínez  MRN: [id number]  YOB: 1940  Date of Evaluation: 10/18/2024    Chief Complaint: Altered mental status, abdominal pain    Subjective:  No acute events overnight, but having fevers  No acute distress  Patient does not communicate/answer questions  No reports of vomiting since patient removed NGT yesterday  Passing stool  Resting in bed at this time      Vital Signs:  Patient Vitals for the past 24 hrs:   BP Temp Temp src Pulse Resp SpO2 Weight   10/18/24 0830 (!) 142/68 99.2 °F (37.3 °C) Axillary (!) 109 18 94 % --   10/18/24 0600 -- -- -- -- -- -- 71.2 kg (156 lb 15.5 oz)   10/18/24 0400 (!) 153/71 (!) 102.5 °F (39.2 °C) Axillary (!) 109 18 95 % --   10/18/24 0010 (!) 174/81 100.1 °F (37.8 °C) Axillary (!) 116 18 94 % --   10/17/24 2000 (!) 160/80 97.4 °F (36.3 °C) Oral (!) 116 -- 96 % --   10/17/24 1757 (!) 162/72 -- -- -- -- -- --   10/17/24 1550 (!) 182/77 99.2 °F (37.3 °C) Axillary (!) 112 17 98 % --   10/17/24 1204 (!) 185/86 98.3 °F (36.8 °C) Axillary 78 18 99 % --      TEMPERATURE HISTORY 24H: Temp (24hrs), Av.5 °F (37.5 °C), Min:97.4 °F (36.3 °C), Max:102.5 °F (39.2 °C)    BLOOD PRESSURE HISTORY: Systolic (36hrs), Av , Min:142 , Max:185    Diastolic (36hrs), Av, Min:68, Max:86      Intake/Output:  No intake/output data recorded.  No intake/output data recorded.  Drain/tube Output:       Physical Exam:  General: awake, drowsy, no acute distress, does not answer questions  Cardiovascular:  tachycardic with regular rhythm and no murmur noted  Lungs: diminished, congested cough  Abdomen: soft, mildly distended, mild poorly localized tenderness but difficult to fully assess as patient is not cooperative with exam/covering abdomen with arms, bowel sounds present     Labs:  CBC:    Recent Labs     10/15/24  1347 10/16/24  0500 10/17/24  1433   WBC 16.2* 14.2* 22.4*   HGB 16.1

## 2024-10-18 NOTE — PROGRESS NOTES
Pt Temp is 102.5, Tylenol given. Perfectserved Natalio-Marker Yahaira, elvia COLIN. Waiting for a response

## 2024-10-18 NOTE — PROGRESS NOTES
Pt BP is 176/78 with a map of 111, perfectserved Natalio Marker Yahaira if she can please put in prn hydralazine for the pt. Waiting for a response

## 2024-10-19 LAB
ANION GAP SERPL CALCULATED.3IONS-SCNC: 17 MMOL/L (ref 3–16)
BASOPHILS # BLD: 0.1 K/UL (ref 0–0.2)
BASOPHILS NFR BLD: 0.7 %
BUN SERPL-MCNC: 23 MG/DL (ref 7–20)
CALCIUM SERPL-MCNC: 8.6 MG/DL (ref 8.3–10.6)
CHLORIDE SERPL-SCNC: 118 MMOL/L (ref 99–110)
CK SERPL-CCNC: 307 U/L (ref 39–308)
CO2 SERPL-SCNC: 21 MMOL/L (ref 21–32)
CREAT SERPL-MCNC: 1.8 MG/DL (ref 0.8–1.3)
DEPRECATED RDW RBC AUTO: 14 % (ref 12.4–15.4)
EOSINOPHIL # BLD: 0 K/UL (ref 0–0.6)
EOSINOPHIL NFR BLD: 0.1 %
GFR SERPLBLD CREATININE-BSD FMLA CKD-EPI: 36 ML/MIN/{1.73_M2}
GLUCOSE SERPL-MCNC: 89 MG/DL (ref 70–99)
HCT VFR BLD AUTO: 34.9 % (ref 40.5–52.5)
HGB BLD-MCNC: 11.2 G/DL (ref 13.5–17.5)
LACTATE BLDV-SCNC: 3.6 MMOL/L (ref 0.4–2)
LYMPHOCYTES # BLD: 2.8 K/UL (ref 1–5.1)
LYMPHOCYTES NFR BLD: 13.7 %
MCH RBC QN AUTO: 31.2 PG (ref 26–34)
MCHC RBC AUTO-ENTMCNC: 32.1 G/DL (ref 31–36)
MCV RBC AUTO: 97.2 FL (ref 80–100)
MONOCYTES # BLD: 0.9 K/UL (ref 0–1.3)
MONOCYTES NFR BLD: 4.1 %
MRSA SPEC QL CULT: NORMAL
NEUTROPHILS # BLD: 16.9 K/UL (ref 1.7–7.7)
NEUTROPHILS NFR BLD: 81.4 %
PLATELET # BLD AUTO: 244 K/UL (ref 135–450)
PMV BLD AUTO: 10.5 FL (ref 5–10.5)
POTASSIUM SERPL-SCNC: 4.3 MMOL/L (ref 3.5–5.1)
PROCALCITONIN SERPL IA-MCNC: 1.97 NG/ML (ref 0–0.15)
RBC # BLD AUTO: 3.59 M/UL (ref 4.2–5.9)
SODIUM SERPL-SCNC: 156 MMOL/L (ref 136–145)
WBC # BLD AUTO: 20.7 K/UL (ref 4–11)

## 2024-10-19 PROCEDURE — 6360000002 HC RX W HCPCS: Performed by: NURSE PRACTITIONER

## 2024-10-19 PROCEDURE — 84145 PROCALCITONIN (PCT): CPT

## 2024-10-19 PROCEDURE — 6360000002 HC RX W HCPCS: Performed by: STUDENT IN AN ORGANIZED HEALTH CARE EDUCATION/TRAINING PROGRAM

## 2024-10-19 PROCEDURE — 82550 ASSAY OF CK (CPK): CPT

## 2024-10-19 PROCEDURE — 6370000000 HC RX 637 (ALT 250 FOR IP): Performed by: STUDENT IN AN ORGANIZED HEALTH CARE EDUCATION/TRAINING PROGRAM

## 2024-10-19 PROCEDURE — 99232 SBSQ HOSP IP/OBS MODERATE 35: CPT | Performed by: SURGERY

## 2024-10-19 PROCEDURE — 2580000003 HC RX 258: Performed by: INTERNAL MEDICINE

## 2024-10-19 PROCEDURE — 6360000002 HC RX W HCPCS: Performed by: INTERNAL MEDICINE

## 2024-10-19 PROCEDURE — 36415 COLL VENOUS BLD VENIPUNCTURE: CPT

## 2024-10-19 PROCEDURE — 2580000003 HC RX 258: Performed by: NURSE PRACTITIONER

## 2024-10-19 PROCEDURE — 80048 BASIC METABOLIC PNL TOTAL CA: CPT

## 2024-10-19 PROCEDURE — 83605 ASSAY OF LACTIC ACID: CPT

## 2024-10-19 PROCEDURE — 2060000000 HC ICU INTERMEDIATE R&B

## 2024-10-19 PROCEDURE — 85025 COMPLETE CBC W/AUTO DIFF WBC: CPT

## 2024-10-19 PROCEDURE — 6370000000 HC RX 637 (ALT 250 FOR IP): Performed by: INTERNAL MEDICINE

## 2024-10-19 RX ORDER — LIDOCAINE HYDROCHLORIDE 20 MG/ML
100 INJECTION, SOLUTION INTRAVENOUS ONCE
Status: DISCONTINUED | OUTPATIENT
Start: 2024-10-19 | End: 2024-10-19 | Stop reason: CLARIF

## 2024-10-19 RX ORDER — SODIUM CHLORIDE 0.9 % (FLUSH) 0.9 %
5-40 SYRINGE (ML) INJECTION PRN
Status: DISCONTINUED | OUTPATIENT
Start: 2024-10-19 | End: 2024-10-22 | Stop reason: HOSPADM

## 2024-10-19 RX ORDER — LIDOCAINE HYDROCHLORIDE 10 MG/ML
50 INJECTION, SOLUTION EPIDURAL; INFILTRATION; INTRACAUDAL; PERINEURAL ONCE
Status: DISCONTINUED | OUTPATIENT
Start: 2024-10-19 | End: 2024-10-22 | Stop reason: HOSPADM

## 2024-10-19 RX ORDER — SODIUM CHLORIDE 450 MG/100ML
INJECTION, SOLUTION INTRAVENOUS CONTINUOUS
Status: ACTIVE | OUTPATIENT
Start: 2024-10-19 | End: 2024-10-20

## 2024-10-19 RX ORDER — SODIUM CHLORIDE 0.9 % (FLUSH) 0.9 %
5-40 SYRINGE (ML) INJECTION EVERY 12 HOURS SCHEDULED
Status: DISCONTINUED | OUTPATIENT
Start: 2024-10-19 | End: 2024-10-22 | Stop reason: HOSPADM

## 2024-10-19 RX ORDER — SODIUM CHLORIDE 9 MG/ML
INJECTION, SOLUTION INTRAVENOUS PRN
Status: DISCONTINUED | OUTPATIENT
Start: 2024-10-19 | End: 2024-10-22 | Stop reason: HOSPADM

## 2024-10-19 RX ORDER — SODIUM CHLORIDE 450 MG/100ML
INJECTION, SOLUTION INTRAVENOUS CONTINUOUS
Status: DISCONTINUED | OUTPATIENT
Start: 2024-10-19 | End: 2024-10-19

## 2024-10-19 RX ADMIN — WATER 5 MG: 1 INJECTION INTRAMUSCULAR; INTRAVENOUS; SUBCUTANEOUS at 09:21

## 2024-10-19 RX ADMIN — TIMOLOL MALEATE 1 DROP: 5 SOLUTION OPHTHALMIC at 12:12

## 2024-10-19 RX ADMIN — LINEZOLID 600 MG: 600 INJECTION, SOLUTION INTRAVENOUS at 00:52

## 2024-10-19 RX ADMIN — BRIMONIDINE TARTRATE 1 DROP: 2 SOLUTION OPHTHALMIC at 09:33

## 2024-10-19 RX ADMIN — LABETALOL HYDROCHLORIDE 5 MG: 5 INJECTION, SOLUTION INTRAVENOUS at 09:21

## 2024-10-19 RX ADMIN — MICAFUNGIN SODIUM 100 MG: 100 INJECTION, POWDER, LYOPHILIZED, FOR SOLUTION INTRAVENOUS at 12:30

## 2024-10-19 RX ADMIN — HEPARIN SODIUM 5000 UNITS: 5000 INJECTION INTRAVENOUS; SUBCUTANEOUS at 16:29

## 2024-10-19 RX ADMIN — LABETALOL HYDROCHLORIDE 5 MG: 5 INJECTION, SOLUTION INTRAVENOUS at 16:29

## 2024-10-19 RX ADMIN — SODIUM CHLORIDE: 4.5 INJECTION, SOLUTION INTRAVENOUS at 13:49

## 2024-10-19 RX ADMIN — LABETALOL HYDROCHLORIDE 5 MG: 5 INJECTION, SOLUTION INTRAVENOUS at 02:55

## 2024-10-19 RX ADMIN — HYDRALAZINE HYDROCHLORIDE 5 MG: 20 INJECTION INTRAMUSCULAR; INTRAVENOUS at 13:52

## 2024-10-19 RX ADMIN — POLYVINYL ALCOHOL, POVIDONE 2 DROP: 14; 6 SOLUTION/ DROPS OPHTHALMIC at 12:11

## 2024-10-19 RX ADMIN — HYDROMORPHONE HYDROCHLORIDE 0.5 MG: 1 INJECTION, SOLUTION INTRAMUSCULAR; INTRAVENOUS; SUBCUTANEOUS at 13:51

## 2024-10-19 RX ADMIN — DORZOLAMIDE HYDROCHLORIDE 1 DROP: 20 SOLUTION OPHTHALMIC at 09:36

## 2024-10-19 RX ADMIN — MEROPENEM 1000 MG: 1 INJECTION INTRAVENOUS at 09:23

## 2024-10-19 RX ADMIN — SODIUM CHLORIDE: 9 INJECTION, SOLUTION INTRAVENOUS at 02:04

## 2024-10-19 RX ADMIN — HEPARIN SODIUM 5000 UNITS: 5000 INJECTION INTRAVENOUS; SUBCUTANEOUS at 06:23

## 2024-10-19 RX ADMIN — HEPARIN SODIUM 5000 UNITS: 5000 INJECTION INTRAVENOUS; SUBCUTANEOUS at 21:33

## 2024-10-19 RX ADMIN — LINEZOLID 600 MG: 600 INJECTION, SOLUTION INTRAVENOUS at 12:10

## 2024-10-19 RX ADMIN — ACETAMINOPHEN 650 MG: 650 SUPPOSITORY RECTAL at 14:15

## 2024-10-19 RX ADMIN — SODIUM CHLORIDE: 9 INJECTION, SOLUTION INTRAVENOUS at 12:09

## 2024-10-19 ASSESSMENT — PAIN SCALES - WONG BAKER: WONGBAKER_NUMERICALRESPONSE: NO HURT

## 2024-10-19 ASSESSMENT — PAIN SCALES - PAIN ASSESSMENT IN ADVANCED DEMENTIA (PAINAD)
BREATHING: OCCASIONAL LABORED BREATHING, SHORT PERIOD OF HYPERVENTILATION
TOTALSCORE: 7
BODYLANGUAGE: TENSE, DISTRESSED PACING, FIDGETING
NEGVOCALIZATION: REPEATED TROUBLED CALLING OUT, LOUD MOANING/GROANING, CRYING
CONSOLABILITY: UNABLE TO CONSOLE, DISTRACT OR REASSURE
FACIALEXPRESSION: SAD, FRIGHTENED, FROWN

## 2024-10-19 NOTE — PLAN OF CARE
Problem: Safety - Medical Restraint  Goal: Remains free of injury from restraints (Restraint for Interference with Medical Device)  Description: INTERVENTIONS:  1. Determine that other, less restrictive measures have been tried or would not be effective before applying the restraint  2. Evaluate the patient's condition at the time of restraint application  3. Inform patient/family regarding the reason for restraint  4. Q2H: Monitor safety, psychosocial status, comfort, nutrition and hydration  Outcome: Progressing     Problem: Discharge Planning  Goal: Discharge to home or other facility with appropriate resources  Outcome: Progressing     Problem: Safety - Adult  Goal: Free from fall injury  Outcome: Progressing     Problem: Skin/Tissue Integrity  Goal: Absence of new skin breakdown  Description: 1.  Monitor for areas of redness and/or skin breakdown  2.  Assess vascular access sites hourly  3.  Every 4-6 hours minimum:  Change oxygen saturation probe site  4.  Every 4-6 hours:  If on nasal continuous positive airway pressure, respiratory therapy assess nares and determine need for appliance change or resting period.  Outcome: Progressing     Problem: ABCDS Injury Assessment  Goal: Absence of physical injury  Outcome: Progressing     Problem: Pain  Goal: Verbalizes/displays adequate comfort level or baseline comfort level  Outcome: Not Progressing

## 2024-10-19 NOTE — PROGRESS NOTES
Pt had low grade fever and hypotension this afternoon. PRN Hydralazine and tylenol given per orders. Pt also anxious and pulling at things as well as low moaning. PRN Dilaudid given for possible pain despite pt unable to communicate it.

## 2024-10-19 NOTE — PROGRESS NOTES
[facility]        Progress Note    Patient Name: Jelani Martínez  MRN: [id number]  YOB: 1940  Date of Evaluation: 10/19/2024    Chief Complaint: Altered mental status, abdominal pain    Subjective:  No acute events overnight, but having fevers  No acute distress  Patient does not communicate/answer questions  No reports of vomiting  Passing stool  Resting in bed at this time      Vital Signs:  Patient Vitals for the past 24 hrs:   BP Temp Temp src Pulse Resp SpO2 Weight   10/19/24 0920 (!) 175/76 99.7 °F (37.6 °C) Oral (!) 124 20 94 % --   10/19/24 0700 -- -- -- -- -- -- 66.4 kg (146 lb 4.8 oz)   10/19/24 0329 (!) 165/66 98.2 °F (36.8 °C) Oral 99 22 97 % --   10/18/24 2359 (!) 164/82 97.7 °F (36.5 °C) Oral (!) 115 22 98 % --   10/18/24 2330 (!) 158/80 -- -- (!) 116 -- -- --   10/18/24 1951 (!) 188/88 (!) 103 °F (39.4 °C) Axillary (!) 125 22 93 % --   10/18/24 1815 -- (!) 101.1 °F (38.4 °C) Axillary -- -- -- --   10/18/24 1500 (!) 157/76 100.1 °F (37.8 °C) Axillary (!) 116 18 97 % --      TEMPERATURE HISTORY 24H: Temp (24hrs), Av °F (37.8 °C), Min:97.7 °F (36.5 °C), Max:103 °F (39.4 °C)    BLOOD PRESSURE HISTORY: Systolic (36hrs), Av , Min:142 , Max:188    Diastolic (36hrs), Av, Min:66, Max:88      Intake/Output:  No intake/output data recorded.  No intake/output data recorded.  Drain/tube Output:       Physical Exam:  General: awake, drowsy, no acute distress, does not answer questions  Cardiovascular: regular rhythm  Lungs: diminished, congested  Abdomen: firm, mildly distended, active bowel sounds present     Labs:  CBC:    Recent Labs     10/17/24  1433   WBC 22.4*   HGB 11.4*   HCT 33.5*        BMP:    Recent Labs     10/17/24  1433   *   K 3.7      CO2 22   BUN 32*   CREATININE 1.9*   GLUCOSE 88     Hepatic:    Recent Labs     10/17/24  1433   AST 74*   ALT 34   BILITOT 0.6   ALKPHOS 111     Amylase:  No results found

## 2024-10-19 NOTE — PROGRESS NOTES
[facility]    10/19/2024 12:25 PM        Name: Jelani Martínez .              Admitted: 10/15/2024  Primary Care Provider: Ignacio Cortez MD (Tel: [phone])      Hospital course: 84 yo male presented from nursing home with altered mental status. He was reported to be talkative that morning then noted to have periods of decreased responsiveness. He was hypotensive on presentation. Admitted with SBO, sepsis and acute renal failure.He was initially admitted to ICU with pressor support, transferred out of unit 10/16.      Subjective:  lying in bed lethargic not oriented x 3 no fever overnight    Reviewed interval ancillary notes    Current Medications  0.9 % sodium chloride infusion, Continuous  meropenem (MERREM) 1,000 mg in sodium chloride 0.9 % 100 mL IVPB (mini-bag), Q12H  micafungin (MYCAMINE) 100 mg in sodium chloride 0.9 % 100 mL IVPB, Daily  labetalol (NORMODYNE;TRANDATE) injection 5 mg, Q6H  labetalol (NORMODYNE;TRANDATE) injection 5 mg, Q4H PRN  OLANZapine (ZyPREXA) 5 mg in sterile water 1 mL injection, Daily  hydrALAZINE (APRESOLINE) injection 5 mg, Q4H PRN  HYDROmorphone HCl PF (DILAUDID) injection 0.5 mg, Q4H PRN  Polyvinyl Alcohol-Povidone PF (REFRESH) 1.4-0.6 % ophthalmic solution 2 drop, TID  dorzolamide (TRUSOPT) 2 % ophthalmic solution 1 drop, BID   And  timolol (TIMOPTIC) 0.5 % ophthalmic solution 1 drop, BID  phenol 1.4 % mouth spray 1 spray, Q2H PRN  aspirin chewable tablet 81 mg, Daily  atorvastatin (LIPITOR) tablet 80 mg, Nightly  brimonidine (ALPHAGAN) 0.2 % ophthalmic solution 1 drop, BID  latanoprost (XALATAN) 0.005 % ophthalmic solution 1 drop, Nightly  melatonin tablet 9 mg, Nightly PRN  mirtazapine (REMERON) tablet 7.5 mg, Nightly  sodium chloride flush 0.9 % injection 5-40 mL, 2 times per day  sodium chloride flush 0.9 % injection 5-40 mL, PRN  0.9 % sodium chloride infusion,  Additional Contrast? None   Final Result   Findings in keeping with a distal partial small bowel obstruction with the   point of the obstruction probably along the right lower abdomen and could be   due to an adhesion but is indeterminate.  Recommend surgical follow-up.      Moderate irregular subsegmental parenchymal infiltrate left lower lobe   extending superiorly which could represent pneumonia or less likely a mass   lesion.  Recommend follow-up until resolution.      Postop changes along the right upper chest from previous partial   pneumonectomy with hazy ground-glass infiltrates along the right lower lobe   posteriorly which could represent early pneumonia.  Recommend follow-up.      3 cm irregular parenchymal opacity left upper lobe which is partially   calcified probably represents post inflammatory scarring vs old granulomatous   disease or less likely any type of active process in the area.  Recommend   follow-up to assure long-term stability      Mild constipation and a normal appendix.      Mild hydrops of the gallbladder with no gallstones or wall thickening.      Probable inguinal hernias bilaterally which is more prominent on the left and   are unchanged      Mild prostatic enlargement with no pelvic mass or active inflammation.      Mild compression fracture of T9 which may be chronic.  Recommend clinical   follow-up.         XR CHEST PORTABLE   Final Result   New bilateral basal infiltrates or atelectasis. This could represent   multifocal pneumonia or aspiration.         CT HEAD WO CONTRAST   Final Result   No evidence of acute intracranial abnormality.               Problem List  Principal Problem:    Septic shock (HCC)  Active Problems:    Essential hypertension    Complicated UTI (urinary tract infection)    Severe sepsis (HCC)    SBO (small bowel obstruction) (HCC)    Polyneuropathy    Overweight (BMI 25.0-29.9)    History of depression    Mixed hyperlipidemia    CRP elevated    Elevated sed

## 2024-10-20 LAB
ANION GAP SERPL CALCULATED.3IONS-SCNC: 13 MMOL/L (ref 3–16)
ANION GAP SERPL CALCULATED.3IONS-SCNC: 14 MMOL/L (ref 3–16)
BACTERIA BLD CULT: NORMAL
BASOPHILS # BLD: 0.1 K/UL (ref 0–0.2)
BASOPHILS NFR BLD: 0.7 %
BUN SERPL-MCNC: 22 MG/DL (ref 7–20)
BUN SERPL-MCNC: 25 MG/DL (ref 7–20)
CALCIUM SERPL-MCNC: 8.2 MG/DL (ref 8.3–10.6)
CALCIUM SERPL-MCNC: 8.5 MG/DL (ref 8.3–10.6)
CHLORIDE SERPL-SCNC: 120 MMOL/L (ref 99–110)
CHLORIDE SERPL-SCNC: 121 MMOL/L (ref 99–110)
CO2 SERPL-SCNC: 20 MMOL/L (ref 21–32)
CO2 SERPL-SCNC: 22 MMOL/L (ref 21–32)
CREAT SERPL-MCNC: 1.6 MG/DL (ref 0.8–1.3)
CREAT SERPL-MCNC: 1.7 MG/DL (ref 0.8–1.3)
DEPRECATED RDW RBC AUTO: 13.7 % (ref 12.4–15.4)
EOSINOPHIL # BLD: 0 K/UL (ref 0–0.6)
EOSINOPHIL NFR BLD: 0.1 %
GFR SERPLBLD CREATININE-BSD FMLA CKD-EPI: 39 ML/MIN/{1.73_M2}
GFR SERPLBLD CREATININE-BSD FMLA CKD-EPI: 42 ML/MIN/{1.73_M2}
GLUCOSE SERPL-MCNC: 100 MG/DL (ref 70–99)
GLUCOSE SERPL-MCNC: 95 MG/DL (ref 70–99)
HCT VFR BLD AUTO: 34.6 % (ref 40.5–52.5)
HGB BLD-MCNC: 11.2 G/DL (ref 13.5–17.5)
LYMPHOCYTES # BLD: 2.6 K/UL (ref 1–5.1)
LYMPHOCYTES NFR BLD: 15.8 %
MCH RBC QN AUTO: 31 PG (ref 26–34)
MCHC RBC AUTO-ENTMCNC: 32.5 G/DL (ref 31–36)
MCV RBC AUTO: 95.3 FL (ref 80–100)
MONOCYTES # BLD: 0.8 K/UL (ref 0–1.3)
MONOCYTES NFR BLD: 5 %
NEUTROPHILS # BLD: 13.1 K/UL (ref 1.7–7.7)
NEUTROPHILS NFR BLD: 78.4 %
PLATELET # BLD AUTO: 214 K/UL (ref 135–450)
PMV BLD AUTO: 8.9 FL (ref 5–10.5)
POTASSIUM SERPL-SCNC: 3 MMOL/L (ref 3.5–5.1)
POTASSIUM SERPL-SCNC: 3.7 MMOL/L (ref 3.5–5.1)
RBC # BLD AUTO: 3.63 M/UL (ref 4.2–5.9)
SODIUM SERPL-SCNC: 154 MMOL/L (ref 136–145)
SODIUM SERPL-SCNC: 156 MMOL/L (ref 136–145)
WBC # BLD AUTO: 16.7 K/UL (ref 4–11)

## 2024-10-20 PROCEDURE — 6360000002 HC RX W HCPCS: Performed by: STUDENT IN AN ORGANIZED HEALTH CARE EDUCATION/TRAINING PROGRAM

## 2024-10-20 PROCEDURE — 2580000003 HC RX 258: Performed by: HOSPITALIST

## 2024-10-20 PROCEDURE — 6360000002 HC RX W HCPCS: Performed by: NURSE PRACTITIONER

## 2024-10-20 PROCEDURE — 80048 BASIC METABOLIC PNL TOTAL CA: CPT

## 2024-10-20 PROCEDURE — 87040 BLOOD CULTURE FOR BACTERIA: CPT

## 2024-10-20 PROCEDURE — 6370000000 HC RX 637 (ALT 250 FOR IP): Performed by: INTERNAL MEDICINE

## 2024-10-20 PROCEDURE — 36415 COLL VENOUS BLD VENIPUNCTURE: CPT

## 2024-10-20 PROCEDURE — 2060000000 HC ICU INTERMEDIATE R&B

## 2024-10-20 PROCEDURE — 6360000002 HC RX W HCPCS: Performed by: INTERNAL MEDICINE

## 2024-10-20 PROCEDURE — 2580000003 HC RX 258: Performed by: INTERNAL MEDICINE

## 2024-10-20 PROCEDURE — 99232 SBSQ HOSP IP/OBS MODERATE 35: CPT | Performed by: SURGERY

## 2024-10-20 PROCEDURE — 6370000000 HC RX 637 (ALT 250 FOR IP): Performed by: STUDENT IN AN ORGANIZED HEALTH CARE EDUCATION/TRAINING PROGRAM

## 2024-10-20 PROCEDURE — 85025 COMPLETE CBC W/AUTO DIFF WBC: CPT

## 2024-10-20 PROCEDURE — 2580000003 HC RX 258: Performed by: STUDENT IN AN ORGANIZED HEALTH CARE EDUCATION/TRAINING PROGRAM

## 2024-10-20 PROCEDURE — 2580000003 HC RX 258: Performed by: NURSE PRACTITIONER

## 2024-10-20 RX ORDER — DEXTROSE MONOHYDRATE 50 MG/ML
INJECTION, SOLUTION INTRAVENOUS CONTINUOUS
Status: ACTIVE | OUTPATIENT
Start: 2024-10-20 | End: 2024-10-21

## 2024-10-20 RX ADMIN — SODIUM CHLORIDE, PRESERVATIVE FREE 10 ML: 5 INJECTION INTRAVENOUS at 10:13

## 2024-10-20 RX ADMIN — MEROPENEM 1000 MG: 1 INJECTION INTRAVENOUS at 00:54

## 2024-10-20 RX ADMIN — TIMOLOL MALEATE 1 DROP: 5 SOLUTION OPHTHALMIC at 10:15

## 2024-10-20 RX ADMIN — LINEZOLID 600 MG: 600 INJECTION, SOLUTION INTRAVENOUS at 18:42

## 2024-10-20 RX ADMIN — DORZOLAMIDE HYDROCHLORIDE 1 DROP: 20 SOLUTION OPHTHALMIC at 10:15

## 2024-10-20 RX ADMIN — SODIUM CHLORIDE, PRESERVATIVE FREE 10 ML: 5 INJECTION INTRAVENOUS at 23:17

## 2024-10-20 RX ADMIN — SODIUM CHLORIDE 25 ML: 9 INJECTION, SOLUTION INTRAVENOUS at 10:52

## 2024-10-20 RX ADMIN — ACETAMINOPHEN 650 MG: 650 SUPPOSITORY RECTAL at 21:00

## 2024-10-20 RX ADMIN — POLYVINYL ALCOHOL, POVIDONE 2 DROP: 14; 6 SOLUTION/ DROPS OPHTHALMIC at 10:15

## 2024-10-20 RX ADMIN — POLYVINYL ALCOHOL, POVIDONE 2 DROP: 14; 6 SOLUTION/ DROPS OPHTHALMIC at 14:45

## 2024-10-20 RX ADMIN — HEPARIN SODIUM 5000 UNITS: 5000 INJECTION INTRAVENOUS; SUBCUTANEOUS at 05:43

## 2024-10-20 RX ADMIN — LABETALOL HYDROCHLORIDE 5 MG: 5 INJECTION, SOLUTION INTRAVENOUS at 18:42

## 2024-10-20 RX ADMIN — BRIMONIDINE TARTRATE 1 DROP: 2 SOLUTION OPHTHALMIC at 10:15

## 2024-10-20 RX ADMIN — LABETALOL HYDROCHLORIDE 5 MG: 5 INJECTION, SOLUTION INTRAVENOUS at 04:11

## 2024-10-20 RX ADMIN — WATER 5 MG: 1 INJECTION INTRAMUSCULAR; INTRAVENOUS; SUBCUTANEOUS at 10:13

## 2024-10-20 RX ADMIN — SODIUM CHLORIDE, PRESERVATIVE FREE 10 ML: 5 INJECTION INTRAVENOUS at 14:45

## 2024-10-20 RX ADMIN — MEROPENEM 1000 MG: 1 INJECTION INTRAVENOUS at 14:44

## 2024-10-20 RX ADMIN — MICAFUNGIN SODIUM 100 MG: 100 INJECTION, POWDER, LYOPHILIZED, FOR SOLUTION INTRAVENOUS at 10:53

## 2024-10-20 RX ADMIN — LABETALOL HYDROCHLORIDE 5 MG: 5 INJECTION, SOLUTION INTRAVENOUS at 00:49

## 2024-10-20 RX ADMIN — LINEZOLID 600 MG: 600 INJECTION, SOLUTION INTRAVENOUS at 00:51

## 2024-10-20 RX ADMIN — MEROPENEM 1000 MG: 1 INJECTION INTRAVENOUS at 23:14

## 2024-10-20 RX ADMIN — HEPARIN SODIUM 5000 UNITS: 5000 INJECTION INTRAVENOUS; SUBCUTANEOUS at 23:15

## 2024-10-20 RX ADMIN — LABETALOL HYDROCHLORIDE 5 MG: 5 INJECTION, SOLUTION INTRAVENOUS at 10:14

## 2024-10-20 RX ADMIN — HYDROMORPHONE HYDROCHLORIDE 0.5 MG: 1 INJECTION, SOLUTION INTRAMUSCULAR; INTRAVENOUS; SUBCUTANEOUS at 21:00

## 2024-10-20 RX ADMIN — DEXTROSE MONOHYDRATE: 50 INJECTION, SOLUTION INTRAVENOUS at 14:43

## 2024-10-20 ASSESSMENT — PAIN SCALES - WONG BAKER: WONGBAKER_NUMERICALRESPONSE: HURTS WHOLE LOT

## 2024-10-20 NOTE — PROGRESS NOTES
MD Alex Stern MD Aldo Estella, DO                 Office: [phone]                      Fax: [phone]             [employer]                     NEPHROLOGY INITIAL CONSULT NOTE:     PATIENT NAME: Jelani Martínez  : 1940  MRN: [id number]  REASON FOR CONSULT: For evaluation and management of Acute Kidney Injury . (My recommendations will be communicated by way of shared medical record.)  Ordered On  Ordered By   10/17/2024  2:26 PM Haily Brandt, APRN - CNP     Interval history:  -INAD. Poor po intake.    RECOMMENDATIONS:   With poor access, unable to get labs, okay to get PICC or midline, and dominant arm, if needed from renal standpoint    Fup w/ palliative care for GOC     #MONA   -improving slowly with IVF    #hypernatremia   moderate hypernatremia with n.p.o.,  -switched to D5W, follow sNa. Slowly improving    Antibiotic per primary and pharmacy team    Hold lisinopril    Monitor urine output with external urinary catheter  no need for dialysis,    at higher risk for decompensation, needing closer monitoring.    D/C plan from renal stand point:- likely ~2 days more       Thank you for allowing me to participate in this patient's care. Please do not hesitate to contact me anytime. We will follow along with you.       Dieter Zamora DO,  [facility]  Office: [phone] or Via [employer]  Fax: [phone]        IMPRESSION:       Admitted on:  10/15/2024 12:28 PM   For:    Hospital Problems             Last Modified POA    * (Principal) Septic shock (HCC) 10/15/2024 Yes    Essential hypertension 10/18/2024 Yes    Complicated UTI (urinary tract infection) 10/18/2024 Yes    Severe sepsis (HCC) 10/18/2024 Yes    SBO (small bowel obstruction) ([facility]) 10/18/2024 Yes    Polyneuropathy 10/18/2024 Yes    Overweight (BMI 25.0-29.9) 10/18/2024 Yes    History of depression 10/18/2024 Yes    Mixed hyperlipidemia 10/18/2024 Yes    CRP elevated

## 2024-10-20 NOTE — PROGRESS NOTES
Pt inadvertently lost IV access while patient was being cleaned and change this evening. Attempt was made to place new IV and unsuccessful. Text was placed to on call hospitalist for orders to place PICC in order to continue IV abx therapy. Received orders to place PICC line. Report given to oncoming shift RN Salomon and the need to get PICC line.

## 2024-10-20 NOTE — PROGRESS NOTES
MD Alex Stern MD Aldo Estella, DO                 Office: [phone]                      Fax: [phone]             [employer]                     NEPHROLOGY INITIAL CONSULT NOTE:     PATIENT NAME: Jelani Martínez  : 1940  MRN: [id number]  REASON FOR CONSULT: For evaluation and management of Acute Kidney Injury . (My recommendations will be communicated by way of shared medical record.)  Ordered On  Ordered By   10/17/2024  2:26 PM Haily Brandt, APRN - CNP     Interval history:  -INAD, has shivers. Poor po intake.    RECOMMENDATIONS:   With poor access, unable to get labs, okay to get PICC or midline, and dominant arm, if needed from renal standpoint    Fup w/ palliative care for GOC     #MONA   -improving slowly with IVF    Currently on 1/2 NS    moderate hypernatremia with n.p.o.,  -switch to D5W 75cc/hr if hypernatremia worsens.    Antibiotic per primary and pharmacy team    Hold lisinopril    Monitor urine output with external urinary catheter  no need for dialysis,    at higher risk for decompensation, needing closer monitoring.    D/C plan from renal stand point:- likely ~2 days more       Thank you for allowing me to participate in this patient's care. Please do not hesitate to contact me anytime. We will follow along with you.       Dieter Zamora DO,  [facility]  Office: [phone] or Via [employer]/  Fax: [phone]        IMPRESSION:       Admitted on:  10/15/2024 12:28 PM   For:    Hospital Problems             Last Modified POA    * (Principal) Septic shock (HCC) 10/15/2024 Yes    Essential hypertension 10/18/2024 Yes    Complicated UTI (urinary tract infection) 10/18/2024 Yes    Severe sepsis (HCC) 10/18/2024 Yes    SBO (small bowel obstruction) (HCC) 10/18/2024 Yes    Polyneuropathy 10/18/2024 Yes    Overweight (BMI 25.0-29.9) 10/18/2024 Yes    History of depression 10/18/2024 Yes    Mixed hyperlipidemia 10/18/2024 Yes  point appears in the right lower quadrant with no obvious wall thickening or surrounding inflammation in the area.  The stomach is mildly dilated with a moderate air-fluid level.  The colon is normal caliber.  There is mild feces throughout the left colon. Pelvis:   The bladder is unremarkable.  The prostate gland is mildly enlarged.  There is mild fat density along the inguinal regions with mild fluid density along the right inguinal region which is unchanged.  There are no bowel loops in the area.  The appendix is normal with no pericecal inflammation. Peritoneum/Retroperitoneum:   The aorta is normal caliber with no aneurysm and no retroperitoneal mass or adenopathy is seen. Bones/Soft Tissues:   There are moderate degenerative changes throughout the spine with no aggressive osseous lesion.     Findings in keeping with a distal partial small bowel obstruction with the point of the obstruction probably along the right lower abdomen and could be due to an adhesion but is indeterminate.  Recommend surgical follow-up. Moderate irregular subsegmental parenchymal infiltrate left lower lobe extending superiorly which could represent pneumonia or less likely a mass lesion.  Recommend follow-up until resolution. Postop changes along the right upper chest from previous partial pneumonectomy with hazy ground-glass infiltrates along the right lower lobe posteriorly which could represent early pneumonia.  Recommend follow-up. 3 cm irregular parenchymal opacity left upper lobe which is partially calcified probably represents post inflammatory scarring vs old granulomatous disease or less likely any type of active process in the area.  Recommend follow-up to assure long-term stability Mild constipation and a normal appendix. Mild hydrops of the gallbladder with no gallstones or wall thickening. Probable inguinal hernias bilaterally which is more prominent on the left and are unchanged Mild prostatic enlargement with no pelvic

## 2024-10-20 NOTE — PROGRESS NOTES
[facility]        Progress Note    Patient Name: Jelani Martínez  MRN: [id number]  YOB: 1940  Date of Evaluation: 10/20/2024    Chief Complaint: Altered mental status, abdominal pain    Subjective:  No acute events overnight, febrile  No acute distress  Patient moans a couple words this am  No vomiting  Resting in bed at this time      Vital Signs:  Patient Vitals for the past 24 hrs:   BP Temp Temp src Pulse Resp SpO2 Weight   10/20/24 1014 (!) 164/74 -- -- (!) 112 -- -- --   10/20/24 0750 -- -- -- -- -- 96 % --   10/20/24 0748 (!) 184/80 -- -- (!) 114 16 99 % --   10/20/24 0559 -- -- -- -- -- -- 65.5 kg (144 lb 4.8 oz)   10/20/24 0425 (!) 161/90 -- -- 99 18 -- --   10/20/24 0400 (!) 175/76 100 °F (37.8 °C) Temporal (!) 121 18 -- --   10/20/24 0130 (!) 165/84 -- -- (!) 118 19 94 % --   10/19/24 2345 (!) 179/92 99.1 °F (37.3 °C) Temporal (!) 132 19 95 % --   10/19/24 2145 (!) 162/76 -- -- (!) 129 18 -- --   10/19/24 2111 (!) 184/92 -- -- -- -- -- --   10/19/24 2100 (!) 180/81 100.3 °F (37.9 °C) Temporal (!) 135 18 95 % --   10/19/24 1632 -- (!) 101.5 °F (38.6 °C) Temporal -- -- -- --   10/19/24 1616 (!) 160/79 (!) 102.8 °F (39.3 °C) Axillary (!) 136 -- (!) 87 % --   10/19/24 1421 -- -- -- -- 16 -- --   10/19/24 1232 (!) 176/87 (!) 100.6 °F (38.1 °C) Axillary (!) 130 -- 90 % --      TEMPERATURE HISTORY 24H: Temp (24hrs), Av.7 °F (38.2 °C), Min:99.1 °F (37.3 °C), Max:102.8 °F (39.3 °C)    BLOOD PRESSURE HISTORY: Systolic (36hrs), Av , Min:158 , Max:184    Diastolic (36hrs), Av, Min:66, Max:92      Intake/Output:  I/O last 3 completed shifts:  In: 5814.7 [I.V.:2922.8; IV Piggyback:2892]  Out: 350 [Urine:350]  No intake/output data recorded.  Drain/tube Output:       Physical Exam:  General: awake, drowsy, no acute distress, does not answer questions  Cardiovascular: regular rhythm  Lungs: diminished, congested  Abdomen: firm, hypoactive bowel  sodium chloride 25 mL (10/20/24 1052)    sodium chloride Stopped (10/16/24 1552)     PRN Meds:.sodium chloride flush, sodium chloride, labetalol, hydrALAZINE, HYDROmorphone, phenol, melatonin, sodium chloride flush, sodium chloride, ondansetron **OR** ondansetron, polyethylene glycol, acetaminophen **OR** acetaminophen      Assessment:  83 y.o. male admitted with   1. SBO (small bowel obstruction) (HCC)    2. Septic shock (HCC)    3. Acute renal failure, unspecified acute renal failure type (HCC)    4. Altered mental status, unspecified altered mental status type        Small bowel obstruction  Multifocal pneumonia  Septic shock  Acute encephalopathy on chronic dementia  Acute on chronic kidney disease  Elevated troponin      Plan:    Pt with congestion and fever  WBC a little lower  Abd stable exam, AXR  Ongoing pneumonia treatment  Continue antibiotics  Not anticipating need for abd surgery at this point  Not suitable to try oral diet tho - pt not oriented  Probably needs swallow eval before any oral diet - not sure how he is going to eat  Ultimate goals of care uncertain      Inocente Kapadia MD

## 2024-10-20 NOTE — PLAN OF CARE
Problem: Safety - Medical Restraint  Goal: Remains free of injury from restraints (Restraint for Interference with Medical Device)  Description: INTERVENTIONS:  1. Determine that other, less restrictive measures have been tried or would not be effective before applying the restraint  2. Evaluate the patient's condition at the time of restraint application  3. Inform patient/family regarding the reason for restraint  4. Q2H: Monitor safety, psychosocial status, comfort, nutrition and hydration  Outcome: Progressing     Problem: Discharge Planning  Goal: Discharge to home or other facility with appropriate resources  Outcome: Progressing

## 2024-10-20 NOTE — PROCEDURES
PROCEDURE NOTE  Date: 10/20/2024   Name: Jelani Martínez  YOB: 1940    Procedures    PIV line placed in left palmar median antebrachial in 1 attempts.  CHG dressing applied.  Report given to Salomon POWERS.

## 2024-10-21 VITALS
BODY MASS INDEX: 24.34 KG/M2 | OXYGEN SATURATION: 94 % | HEIGHT: 65 IN | RESPIRATION RATE: 20 BRPM | TEMPERATURE: 99.1 F | HEART RATE: 113 BPM | WEIGHT: 146.1 LBS | DIASTOLIC BLOOD PRESSURE: 90 MMHG | SYSTOLIC BLOOD PRESSURE: 178 MMHG

## 2024-10-21 PROBLEM — E44.1 MILD MALNUTRITION (HCC): Chronic | Status: ACTIVE | Noted: 2024-10-21

## 2024-10-21 LAB
ANION GAP SERPL CALCULATED.3IONS-SCNC: 13 MMOL/L (ref 3–16)
BASOPHILS # BLD: 0.1 K/UL (ref 0–0.2)
BASOPHILS NFR BLD: 0.4 %
BUN SERPL-MCNC: 22 MG/DL (ref 7–20)
CALCIUM SERPL-MCNC: 8 MG/DL (ref 8.3–10.6)
CHLORIDE SERPL-SCNC: 114 MMOL/L (ref 99–110)
CO2 SERPL-SCNC: 21 MMOL/L (ref 21–32)
CREAT SERPL-MCNC: 1.5 MG/DL (ref 0.8–1.3)
DEPRECATED RDW RBC AUTO: 14 % (ref 12.4–15.4)
EOSINOPHIL # BLD: 0.1 K/UL (ref 0–0.6)
EOSINOPHIL NFR BLD: 0.7 %
GFR SERPLBLD CREATININE-BSD FMLA CKD-EPI: 45 ML/MIN/{1.73_M2}
GLUCOSE SERPL-MCNC: 127 MG/DL (ref 70–99)
HCT VFR BLD AUTO: 33.2 % (ref 40.5–52.5)
HGB BLD-MCNC: 10.8 G/DL (ref 13.5–17.5)
LYMPHOCYTES # BLD: 2.1 K/UL (ref 1–5.1)
LYMPHOCYTES NFR BLD: 16.2 %
MAGNESIUM SERPL-MCNC: 2.12 MG/DL (ref 1.8–2.4)
MCH RBC QN AUTO: 30.8 PG (ref 26–34)
MCHC RBC AUTO-ENTMCNC: 32.6 G/DL (ref 31–36)
MCV RBC AUTO: 94.4 FL (ref 80–100)
MONOCYTES # BLD: 0.4 K/UL (ref 0–1.3)
MONOCYTES NFR BLD: 3.4 %
NEUTROPHILS # BLD: 10.3 K/UL (ref 1.7–7.7)
NEUTROPHILS NFR BLD: 79.3 %
PLATELET # BLD AUTO: 204 K/UL (ref 135–450)
PMV BLD AUTO: 9.1 FL (ref 5–10.5)
POTASSIUM SERPL-SCNC: 3.2 MMOL/L (ref 3.5–5.1)
RBC # BLD AUTO: 3.51 M/UL (ref 4.2–5.9)
SODIUM SERPL-SCNC: 148 MMOL/L (ref 136–145)
WBC # BLD AUTO: 12.9 K/UL (ref 4–11)

## 2024-10-21 PROCEDURE — 2580000003 HC RX 258: Performed by: HOSPITALIST

## 2024-10-21 PROCEDURE — 6360000002 HC RX W HCPCS: Performed by: INTERNAL MEDICINE

## 2024-10-21 PROCEDURE — 94760 N-INVAS EAR/PLS OXIMETRY 1: CPT

## 2024-10-21 PROCEDURE — 2580000003 HC RX 258: Performed by: INTERNAL MEDICINE

## 2024-10-21 PROCEDURE — 99232 SBSQ HOSP IP/OBS MODERATE 35: CPT | Performed by: SURGERY

## 2024-10-21 PROCEDURE — 6360000002 HC RX W HCPCS: Performed by: STUDENT IN AN ORGANIZED HEALTH CARE EDUCATION/TRAINING PROGRAM

## 2024-10-21 PROCEDURE — 92526 ORAL FUNCTION THERAPY: CPT

## 2024-10-21 PROCEDURE — APPSS15 APP SPLIT SHARED TIME 0-15 MINUTES: Performed by: NURSE PRACTITIONER

## 2024-10-21 PROCEDURE — 6360000002 HC RX W HCPCS: Performed by: NURSE PRACTITIONER

## 2024-10-21 PROCEDURE — 80048 BASIC METABOLIC PNL TOTAL CA: CPT

## 2024-10-21 PROCEDURE — 2580000003 HC RX 258: Performed by: NURSE PRACTITIONER

## 2024-10-21 PROCEDURE — 36415 COLL VENOUS BLD VENIPUNCTURE: CPT

## 2024-10-21 PROCEDURE — 2700000000 HC OXYGEN THERAPY PER DAY

## 2024-10-21 PROCEDURE — 99232 SBSQ HOSP IP/OBS MODERATE 35: CPT | Performed by: INTERNAL MEDICINE

## 2024-10-21 PROCEDURE — 85025 COMPLETE CBC W/AUTO DIFF WBC: CPT

## 2024-10-21 PROCEDURE — APPNB30 APP NON BILLABLE TIME 0-30 MINS: Performed by: NURSE PRACTITIONER

## 2024-10-21 PROCEDURE — 83735 ASSAY OF MAGNESIUM: CPT

## 2024-10-21 PROCEDURE — 92610 EVALUATE SWALLOWING FUNCTION: CPT

## 2024-10-21 RX ORDER — POTASSIUM CHLORIDE 7.45 MG/ML
10 INJECTION INTRAVENOUS
Status: COMPLETED | OUTPATIENT
Start: 2024-10-21 | End: 2024-10-21

## 2024-10-21 RX ORDER — POTASSIUM CHLORIDE 7.45 MG/ML
10 INJECTION INTRAVENOUS
Status: DISPENSED | OUTPATIENT
Start: 2024-10-21 | End: 2024-10-21

## 2024-10-21 RX ORDER — DEXTROSE MONOHYDRATE 50 MG/ML
INJECTION, SOLUTION INTRAVENOUS CONTINUOUS
Status: DISCONTINUED | OUTPATIENT
Start: 2024-10-21 | End: 2024-10-22 | Stop reason: HOSPADM

## 2024-10-21 RX ADMIN — POTASSIUM CHLORIDE 10 MEQ: 10 INJECTION, SOLUTION INTRAVENOUS at 14:39

## 2024-10-21 RX ADMIN — BRIMONIDINE TARTRATE 1 DROP: 2 SOLUTION OPHTHALMIC at 12:25

## 2024-10-21 RX ADMIN — DEXTROSE MONOHYDRATE: 50 INJECTION, SOLUTION INTRAVENOUS at 01:14

## 2024-10-21 RX ADMIN — POTASSIUM CHLORIDE 10 MEQ: 10 INJECTION, SOLUTION INTRAVENOUS at 11:19

## 2024-10-21 RX ADMIN — SODIUM CHLORIDE, PRESERVATIVE FREE 10 ML: 5 INJECTION INTRAVENOUS at 16:57

## 2024-10-21 RX ADMIN — TIMOLOL MALEATE 1 DROP: 5 SOLUTION OPHTHALMIC at 12:25

## 2024-10-21 RX ADMIN — POTASSIUM CHLORIDE 10 MEQ: 10 INJECTION, SOLUTION INTRAVENOUS at 16:54

## 2024-10-21 RX ADMIN — MICAFUNGIN SODIUM 100 MG: 100 INJECTION, POWDER, LYOPHILIZED, FOR SOLUTION INTRAVENOUS at 14:53

## 2024-10-21 RX ADMIN — LINEZOLID 600 MG: 600 INJECTION, SOLUTION INTRAVENOUS at 16:25

## 2024-10-21 RX ADMIN — POTASSIUM CHLORIDE 10 MEQ: 10 INJECTION, SOLUTION INTRAVENOUS at 18:09

## 2024-10-21 RX ADMIN — LABETALOL HYDROCHLORIDE 5 MG: 5 INJECTION, SOLUTION INTRAVENOUS at 12:26

## 2024-10-21 RX ADMIN — LABETALOL HYDROCHLORIDE 5 MG: 5 INJECTION, SOLUTION INTRAVENOUS at 01:49

## 2024-10-21 RX ADMIN — DORZOLAMIDE HYDROCHLORIDE 1 DROP: 20 SOLUTION OPHTHALMIC at 12:25

## 2024-10-21 RX ADMIN — MEROPENEM 1000 MG: 1 INJECTION INTRAVENOUS at 11:16

## 2024-10-21 RX ADMIN — LINEZOLID 600 MG: 600 INJECTION, SOLUTION INTRAVENOUS at 01:54

## 2024-10-21 ASSESSMENT — PAIN SCALES - PAIN ASSESSMENT IN ADVANCED DEMENTIA (PAINAD)
FACIALEXPRESSION: SMILING OR INEXPRESSIVE
BODYLANGUAGE: RELAXED
TOTALSCORE: 0
CONSOLABILITY: NO NEED TO CONSOLE
CONSOLABILITY: NO NEED TO CONSOLE
FACIALEXPRESSION: SMILING OR INEXPRESSIVE
BODYLANGUAGE: RELAXED
BREATHING: NORMAL
TOTALSCORE: 0
BREATHING: NORMAL

## 2024-10-21 NOTE — PROGRESS NOTES
PALLIATIVE MEDICINE PROGRESS NOTE     Patient name:Jelani Martínez    MRN:[id number] :1940  Room/Bed:[facility]3369/3369-01    LOS: 6 days        ASSESSMENT/RECOMMENDATIONS   83 y.o. male with AMS and SBO with underlying dementia         Symptom Management:  AMS- pt has dementia is wheelchair bound and lives in a memory care unit at baseline today he is minimally responsive with no attempts at verbal response, pt becomes agitated with any touch   SBO- surgery following it is resolving    Goals of Care- talked to son Cole he was hopeful that pt would \"snap back\" like he has with past acute issues but he now sees that pt is terminal and wants his code changed back to DNRCC and is asking to talk to HOC tomorrow and send pt back to LTC on Wednesday with Hospice support. Referral faxed to [facility] per family preference and code status returned to DNRCC.      Patient/Family Goals of Care :    10/17  pt is non-verbal at present he has a signed DNRCC signed by himself and MD at facility from . According to chart review melinda Galvan rescinded that and made pt FULL code on admission. Left message for son to discuss what GOC for his dad are at present    10/21  talked to melinda Galvan he was hopeful that pt would \"snap back\" like he has with past acute issues but he now sees that pt is terminal and wants his code changed back to DNRCC and is asking to talk to HOC tomorrow and send pt back to LTC on Wednesday with Hospice support. Referral faxed to [facility] per family preference and code status returned to DNRCC.      Disposition/Discharge Plan:   pending     Advance Directives:     The patient has appointed the following active healthcare agents:    Primary Decision Maker: TanyaCole - Child - [phone]    Secondary Decision Maker: Mamta Deleon - Child - [phone]     The Patient has the following current code status:    Code Status: DNRCC        Interactive exchange regarding medications,tests and procedures with:

## 2024-10-21 NOTE — PROGRESS NOTES
Nutrition Note    RECOMMENDATIONS  PO Diet: SLP consult with pleasure feeds as appropriate  Nutrition Support: Should goals of care change and pt remains NPO, recommend initiation of nutrition support. RD remains available to provide orders/recommendations.      ASSESSMENT   Pt triggered for NPO status throughout admission now x 6 days r/t SBO with no plans for surgical intervention. Hx of dementia, currently oriented x1 to person. Per palliative note this morning, son wanting to send pt back to LTC with hospice support. RD will continue to monitor should goals of care change.     Malnutrition Status: Mild malnutrition  Acute Illness  Findings of the 6 clinical characteristics of malnutrition:  Energy Intake:  50% or less of estimated energy requirements for 5 or more days  Weight Loss:  No significant weight loss     Body Fat Loss:  Unable to assess     Muscle Mass Loss:  Unable to assess    Fluid Accumulation:  No significant fluid accumulation     Strength:  Not Performed    NUTRITION DIAGNOSIS   Inadequate protein-energy intake related to cognitive or neurological impairment, altered GI function as evidenced by NPO or clear liquid status due to medical condition    Goals: Initiate nutrition support, Initiate PO diet, by next RD assessment     NUTRITION RELATED FINDINGS  Objective: D5% at 100 mL/hr. Na+ 148. LBM 10/19. No edema noted.  Wounds: None    CURRENT NUTRITION THERAPIES  Diet NPO Exceptions are: Sips of Water with Meds     PO Intake: NPO   PO Supplement Intake:NPO    Additional Calorie Sources:  D5% at 100 mL/hr provides 408 calories per day from dextrose.    ANTHROPOMETRICS  Current Height: 165.1 cm (5' 5\")  Current Weight - Scale: 66.3 kg (146 lb 1.6 oz)    Admission weight: 68 kg (149 lb 14.6 oz)  Ideal Body Weight (IBW): 136 lbs  (62 kg)        BMI: 24.3    COMPARATIVE STANDARDS  Total Energy Requirements (kcals/day): 7939-5280     Protein (g):  66-79       Fluid (mL/day):

## 2024-10-21 NOTE — PROGRESS NOTES
MD Alex Stern MD Aldo Estella, DO                 Office: [phone]                      Fax: [phone]             [employer]         NEPHROLOGY PROGRESS NOTE:     PATIENT NAME: Jelani Martínez  : 1940  MRN: [id number]  REASON FOR CONSULT: For evaluation and management of Acute Kidney Injury . (My recommendations will be communicated by way of shared medical record.)  Ordered On  Ordered By   10/17/2024  2:26 PM Haily Brandt, APRN - CNP     Interval history:  -INAD. Poor po intake.    RECOMMENDATIONS:   Creatinine better today  Currently DNR comfort care  D5W at 100 mL/h for 24 more hours  Effer-K 40 mEq x 1      Fup w/ palliative care for GOC     #MONA   -improving slowly with IVF    #hypernatremia-improving   moderate hypernatremia with n.p.o.,  -switched to D5W, follow sNa. Slowly improving    Antibiotic per primary and pharmacy team    Hold lisinopril        D/C plan from renal stand point:- likely ~2 days more       Thank you for allowing me to participate in this patient's care. Please do not hesitate to contact me anytime. We will follow along with you.       Ranjeet Gray MD,  [facility]  Office: [phone] or [address] [employer]  Fax: [phone]        IMPRESSION:       Admitted on:  10/15/2024 12:28 PM   For:    Hospital Problems             Last Modified POA    * (Principal) Septic shock (HCC) 10/15/2024 Yes    Essential hypertension 10/18/2024 Yes    Complicated UTI (urinary tract infection) 10/18/2024 Yes    Severe sepsis (HCC) 10/18/2024 Yes    SBO (small bowel obstruction) (HCC) 10/18/2024 Yes    Polyneuropathy 10/18/2024 Yes    Overweight (BMI 25.0-29.9) 10/18/2024 Yes    History of depression 10/18/2024 Yes    Mixed hyperlipidemia 10/18/2024 Yes    CRP elevated 10/18/2024 Yes    Elevated sed rate 10/18/2024 Yes    Mild malnutrition (HCC) (Chronic) 10/21/2024 Yes         MONA : On admission.  Slowly improving  H/O                =======================================================================================   =======================================================================================    Chart reviewed, patient's pertinent electronic medical records , Medical history and medication reviewed as needed for my evaulation.        CHIEF COMPLAINT:   Chief Complaint   Patient presents with    Altered Mental Status     Pt was sent from nursing home to ER for altered mental status. Nursing home staff reports that pt was rolling around trying to talk to women residents in his wheelchair and not responding to staff. Pt is DNR and paperwork is at bedside.      History Obtained From:  reason patient could not give history:  altered mental status + treatment team + Electronic Medical Records  as needed for my evaulation.    HPI: Mr. Martínez is a 83 y.o. 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, stage 1 through stage 4 or unspecified chronic kidney disease, without heart failure     Benign prostatic hyperplasia without lower urinary tract symptoms     BPH (benign prostatic hyperplasia)     Carpal tunnel syndrome     Cerebral infarction (HCC)     Chronic kidney disease, stage 2 (mild)     Cognitive communication deficit     Constipation     COVID-19 12/30/2021    Diverticulosis     Dysuria     Emphysema, unspecified (HCC)     Hyperlipidemia     Hypertension     Hypokalemia     Major depression     Muscle weakness     Neuralgia and neuritis     Neuralgia and neuritis     Nicotine dependence     Peripheral vascular disease (HCC)     Polyneuropathy     Polyosteoarthritis     Primary localized osteoarthritis     Radiculopathy     TIA (transient ischemic attack)     TIA (transient ischemic attack)     Urge incontinence     Vascular

## 2024-10-21 NOTE — PROGRESS NOTES
[facility]        Progress Note    Patient Name: Jelani Martínez  MRN: [id number]  YOB: 1940  Date of Evaluation: 10/21/2024    Chief Complaint: Altered mental status, abdominal pain    Subjective:  No acute events overnight  No acute distress  Patient somewhat interactive, answering 'yes' or 'no' questions  Denies pain  No reports of vomiting, would like food  Passing stool  Resting in bed at this time      Vital Signs:  Patient Vitals for the past 24 hrs:   BP Temp Temp src Pulse Resp SpO2 Height Weight   10/21/24 1226 -- -- -- (!) 122 -- -- -- --   10/21/24 0955 -- -- -- -- -- -- 1.651 m (5' 5\") --   10/21/24 0855 -- -- -- (!) 103 -- 99 % -- --   10/21/24 0700 (!) 151/75 98 °F (36.7 °C) Temporal (!) 106 24 -- -- --   10/21/24 0533 -- -- -- -- -- -- -- 66.3 kg (146 lb 1.6 oz)   10/21/24 0304 (!) 164/72 99.3 °F (37.4 °C) Temporal (!) 102 19 93 % -- --   10/20/24 2252 (!) 177/76 98.8 °F (37.1 °C) Temporal (!) 103 18 97 % -- --   10/20/24 2130 -- -- -- -- 18 -- -- --   10/20/24 2045 (!) 171/87 (!) 101.1 °F (38.4 °C) Temporal (!) 122 18 94 % -- --   10/20/24 1842 (!) 178/82 -- -- (!) 109 -- -- -- --      TEMPERATURE HISTORY 24H: Temp (24hrs), Av.3 °F (37.4 °C), Min:98 °F (36.7 °C), Max:101.1 °F (38.4 °C)    BLOOD PRESSURE HISTORY: Systolic (36hrs), Av , Min:151 , Max:184    Diastolic (36hrs), Av, Min:72, Max:90      Intake/Output:  I/O last 3 completed shifts:  In: 2833.9 [I.V.:1872.7; IV Piggyback:961.2]  Out: 350 [Urine:350]  No intake/output data recorded.  Drain/tube Output:       Physical Exam:  General: awake, alert, no acute distress, answering simple questions  Cardiovascular: tachycardic with regular rhythm and no murmur noted  Lungs: diminished  Abdomen: soft, non-distended, non-tender, bowel sounds present     Labs:  CBC:    Recent Labs     10/19/24  0514 10/20/24  0830 10/21/24  0821   WBC 20.7* 16.7* 12.9*   HGB 11.2* 11.2* 10.8*

## 2024-10-21 NOTE — CARE COORDINATION
Discharge Planning Note    noted that patient from [facility].   also noted HOC has been consulted.   spoke with Rosie with HOC who states she is working with family and anticipates if they agree to HOC patient will return to [facility] with HOC.   called and left message for Coco at [facility] to confirm patients status and ability to return.   following for confirmation of discharge plan.      spoke with Coco at Home at [facility] who states patient can return to facility with or with out hospice without need for precert.     spoke with Rosie with HOC who states son would like to think about Hospice services and meet with HOC tomorrow.       to follow for confirmation of discharge plan.   Electronically signed by JEFF José on 10/21/2024 at 1:29 PM  Phone [phone]

## 2024-10-21 NOTE — PROGRESS NOTES
Speech Language Pathology  [facility]  [phone]  SLP Clinical Swallow Evaluation       Patient: Jelani Martínez   : 1940   MRN: [id number]      Evaluation Date: 10/21/2024      Admitting Dx: SBO (small bowel obstruction) ([facility]) [K56.609]  Septic shock ([facility]) [A41.9, R65.21]  Acute renal failure, unspecified acute renal failure type (HCC) [N17.9]  Altered mental status, unspecified altered mental status type [R41.82]  Treatment Diagnosis: Oropharyngeal Dysphagia   Pain: Did not state                                  Recommendations      Recommended Diet and Intervention 10/21/2024:  Diet Solids Recommendation:  NPO  Liquid Consistency Recommendation:  NPO Recommended form of Meds: Meds via alternative means      Compensatory strategies: To be determined     Discharge Recommendations:  Recommend ongoing SLP for dysphagia therapy upon discharge from hospital     History/Course of Treatment     H&P:   Patient is a 83 y.o. male with a PMHx of Dementia, CVA, HTN, HLD, CKD, chronic hypoxemic RF on 4L NC who presented to the ED with AMS. History is obtained from NH staff as patient confused. Patient is a NH resident with a pertinent hx of dementia who presents due to concerns of AMS. Per NH staff, they state je was more talkative this morning with female residents and then suddenly became unresponsive. Patient presents with a DNRCC, however given patients guarded prognosis, the son, who is his POA was called to be updated in hope of making comfort care. The son has decided to rescind his DNRCC and make him a full code in hopes of clinical improvement.      History obtained from: Patient, ED provider, EMR and family     Imaging:  CT Chest:   Findings in keeping with a distal partial small bowel obstruction with the  point of the obstruction probably along the right lower abdomen and could be  due to an adhesion but is indeterminate.  Recommend surgical follow-up.      Moderate irregular subsegmental parenchymal infiltrate left lower lobe  extending superiorly which could represent pneumonia or less likely a mass  lesion.  Recommend follow-up until resolution.     Postop changes along the right upper chest from previous partial  pneumonectomy with hazy ground-glass infiltrates along the right lower lobe  posteriorly which could represent early pneumonia.  Recommend follow-up.     3 cm irregular parenchymal opacity left upper lobe which is partially  calcified probably represents post inflammatory scarring vs old granulomatous  disease or less likely any type of active process in the area.  Recommend  follow-up to assure long-term stability     Mild constipation and a normal appendix.     Mild hydrops of the gallbladder with no gallstones or wall thickening.     Probable inguinal hernias bilaterally which is more prominent on the left and  are unchanged     Mild prostatic enlargement with no pelvic mass or active inflammation.     Mild compression fracture of T9 which may be chronic.  Recommend clinical  follow-up.    Head CT:   No evidence of acute intracranial abnormality.     History/Prior Level of Function:   Living Status: lives in a skilled nursing facility  Prior Dysphagia History: Pt was seen here in January and August where he was evaluated and put on a minced and moist diet with thin liquids and soft and bite sized diet with thin liquids. Pt on a minced and moist diet at his ECF.   Reason for referral: SLP evaluation orders received due to dysphagia risk .      Evaluation/Treatment   DYSPHAGIA BEDSIDE SWALLOW EVALUATION   Dysphagia Impressions/Dysphagia Diagnosis: Oropharyngeal Dysphagia   Pt alert and confused, with impaired comprehension of questions and commands and significantly impaired speech intelligibility. Positioned Upright in bed . On  4L O2 via nasal cannula  with RR <20/min. Oral motor and laryngeal function exam limited due to pt's cognitive status and

## 2024-10-21 NOTE — DISCHARGE INSTR - COC
Continuity of Care Form    Patient Name: Jelani Martínez   :  1940  MRN:  [id number]    Admit date:  10/15/2024  Discharge date:  ***    Code Status Order: Full Code   Advance Directives:   Advance Care Flowsheet Documentation             Admitting Physician:  Chris Joshi MD  PCP: Ignacio Cortez MD    Discharging Nurse: ***  Discharging Hospital Unit/Room#: 3TN-3369/3369-01  Discharging Unit Phone Number: ***    Emergency Contact:   Extended Emergency Contact Information  Primary Emergency Contact: Cole Martínez  Address: [address] [place] [address]  Home Phone: [phone]  Relation: Child  Secondary Emergency Contact: Leonardo Garland  Home Phone: [phone]  Mobile Phone: [phone]  Relation: Niece/Nephew   needed? No    Past Surgical History:  Past Surgical History:   Procedure Laterality Date    LUNG REMOVAL, PARTIAL Right     noted on CT scan    TONSILLECTOMY         Immunization History:   Immunization History   Administered Date(s) Administered    COVID-19, PFIZER Bivalent, DO NOT Dilute, (age 12y+), IM, 30 mcg/0.3 mL 07/10/2023    COVID-19, PFIZER PURPLE top, DILUTE for use, (age 12 y+), 30mcg/0.3mL 2021, 2021, 2021       Active Problems:  Patient Active Problem List   Diagnosis Code    Chest pain R07.9    Unstable angina ([facility]) I20.0    Essential hypertension I10    Dementia ([facility]) F03.90    Musculoskeletal chest pain R07.89    Pneumonia J18.9    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    Emphysema, unspecified ([facility]) J43.9    Altered mental status R41.82    Benign prostatic hyperplasia without lower urinary tract symptoms N40.0    Severe vascular dementia ([facility]) F01.C0    Hypertensive emergency I16.1    Hypertensive encephalopathy I67.4    NPH (normal pressure hydrocephalus) ([facility]) G91.2    Complicated UTI (urinary tract infection) N39.0    Severe sepsis ([facility]) A41.9, R65.20    Lactic acidosis E87.20    Septic shock ([facility]) A41.9,  R65.21    SBO (small bowel obstruction) ([facility]) K56.609    Polyneuropathy G62.9    Overweight (BMI 25.0-29.9) E66.3    History of depression Z86.59    Mixed hyperlipidemia E78.2    CRP elevated R79.82    Elevated sed rate R70.0       Isolation/Infection:   Isolation            No Isolation          Patient Infection Status       None to display                     Nurse Assessment:  Last Vital Signs: BP (!) 151/75   Pulse (!) 106   Temp 98 °F (36.7 °C) (Temporal)   Resp 24   Wt 66.3 kg (146 lb 1.6 oz)   SpO2 93%   BMI 24.31 kg/m²     Last documented pain score (0-10 scale): Pain Level: 0  Last Weight:   Wt Readings from Last 1 Encounters:   10/21/24 66.3 kg (146 lb 1.6 oz)     Mental Status:  {IP PT MENTAL STATUS:20030}    IV Access:  { CHE IV ACCESS:022979074}    Nursing Mobility/ADLs:  Walking   {CHP DME ADLs:674434759}  Transfer  {CHP DME ADLs:871943105}  Bathing  {CHP DME ADLs:722283691}  Dressing  {CHP DME ADLs:569777063}  Toileting  {CHP DME ADLs:133495573}  Feeding  {P DME ADLs:295127850}  Med Admin  {P DME ADLs:565883612}  Med Delivery   { CHE MED Delivery:992088243}    Wound Care Documentation and Therapy:        Elimination:  Continence:   Bowel: {YES / NO:19727}  Bladder: {YES / NO:19727}  Urinary Catheter: {Urinary Catheter:674403219}   Colostomy/Ileostomy/Ileal Conduit: {YES / NO:19727}       Date of Last BM: ***    Intake/Output Summary (Last 24 hours) at 10/21/2024 0830  Last data filed at 10/21/2024 0533  Gross per 24 hour   Intake 1554.22 ml   Output --   Net 1554.22 ml     I/O last 3 completed shifts:  In: 2833.9 [I.V.:1872.7; IV Piggyback:961.2]  Out: 350 [Urine:350]    Safety Concerns:     { CHE Safety Concerns:445112807}    Impairments/Disabilities:      {MH CHE Impairments/Disabilities:474562499}    Nutrition Therapy:  Current Nutrition Therapy:   {[facility] Diet List:523594054}    Routes of Feeding: {[facility] DME Other Feedings:524076047}  Liquids: {Slp liquid thickness:86053}  Daily

## 2024-10-21 NOTE — PROGRESS NOTES
[facility]        Progress Note    Patient Name: Jelani Martínez  MRN: [id number]  YOB: 1940  Date of Evaluation: 10/21/2024    Chief Complaint: Altered mental status, abdominal pain    Subjective:  No acute events overnight  No acute distress  Patient somewhat interactive, answering 'yes' or 'no' questions  Denies pain  No reports of vomiting, would like food  Passing stool  Resting in bed at this time      Vital Signs:  Patient Vitals for the past 24 hrs:   BP Temp Temp src Pulse Resp SpO2 Height Weight   10/21/24 0955 -- -- -- -- -- -- 1.651 m (5' 5\") --   10/21/24 0855 -- -- -- (!) 103 -- 99 % -- --   10/21/24 0700 (!) 151/75 98 °F (36.7 °C) Temporal (!) 106 24 -- -- --   10/21/24 0533 -- -- -- -- -- -- -- 66.3 kg (146 lb 1.6 oz)   10/21/24 0304 (!) 164/72 99.3 °F (37.4 °C) Temporal (!) 102 19 93 % -- --   10/20/24 2252 (!) 177/76 98.8 °F (37.1 °C) Temporal (!) 103 18 97 % -- --   10/20/24 2130 -- -- -- -- 18 -- -- --   10/20/24 2045 (!) 171/87 (!) 101.1 °F (38.4 °C) Temporal (!) 122 18 94 % -- --   10/20/24 1842 (!) 178/82 -- -- (!) 109 -- -- -- --      TEMPERATURE HISTORY 24H: Temp (24hrs), Av.3 °F (37.4 °C), Min:98 °F (36.7 °C), Max:101.1 °F (38.4 °C)    BLOOD PRESSURE HISTORY: Systolic (36hrs), Av , Min:151 , Max:184    Diastolic (36hrs), Av, Min:72, Max:92      Intake/Output:  I/O last 3 completed shifts:  In: 2833.9 [I.V.:1872.7; IV Piggyback:961.2]  Out: 350 [Urine:350]  No intake/output data recorded.  Drain/tube Output:       Physical Exam:  General: awake, alert, no acute distress, answering simple questions  Cardiovascular: tachycardic with regular rhythm and no murmur noted  Lungs: diminished  Abdomen: soft, non-distended, non-tender, bowel sounds present     Labs:  CBC:    Recent Labs     10/19/24  0514 10/20/24  0830 10/21/24  0821   WBC 20.7* 16.7* 12.9*   HGB 11.2* 11.2* 10.8*   HCT 34.9* 34.6* 33.2*    214 204      Urine culture  Lab Results   Component Value Date/Time    LABURIN No growth at 18 to 36 hours 10/16/2024 05:04 AM       Pathology:  No relevant pathology     Imaging:  I have personally reviewed the following films:    No results found.    Scheduled Meds:   potassium chloride  10 mEq IntraVENous Q1H    sodium chloride flush  5-40 mL IntraVENous 2 times per day    lidocaine 1 % injection  50 mg IntraDERmal Once    meropenem  1,000 mg IntraVENous Q12H    micafungin  100 mg IntraVENous Daily    labetalol  5 mg IntraVENous Q6H    OLANZapine (ZyPREXA) 5 mg in sterile water 1 mL injection  5 mg IntraMUSCular Daily    Polyvinyl Alcohol-Povidone PF  2 drop Both Eyes TID    dorzolamide  1 drop Both Eyes BID    And    timolol  1 drop Both Eyes BID    aspirin  81 mg Oral Daily    atorvastatin  80 mg Oral Nightly    brimonidine  1 drop Both Eyes BID    latanoprost  1 drop Both Eyes Nightly    mirtazapine  7.5 mg Oral Nightly    sodium chloride flush  5-40 mL IntraVENous 2 times per day    heparin (porcine)  5,000 Units SubCUTAneous 3 times per day    linezolid  600 mg IntraVENous Q12H     Continuous Infusions:   dextrose 100 mL/hr at 10/21/24 0114    sodium chloride 25 mL (10/20/24 1052)    sodium chloride Stopped (10/16/24 1552)     PRN Meds:.sodium chloride flush, sodium chloride, labetalol, hydrALAZINE, phenol, melatonin, sodium chloride flush, sodium chloride, ondansetron **OR** ondansetron, polyethylene glycol, acetaminophen **OR** acetaminophen      Assessment:  83 y.o. male admitted with   1. SBO (small bowel obstruction) ([facility])    2. Septic shock (HCC)    3. Acute renal failure, unspecified acute renal failure type (HCC)    4. Altered mental status, unspecified altered mental status type        Small bowel obstruction  Multifocal pneumonia  Septic shock  Acute encephalopathy on chronic dementia  Acute on chronic kidney disease  Elevated troponin      Plan:  1. No acute distress, patient somewhat interactive,

## 2024-10-21 NOTE — PROGRESS NOTES
[facility]    1500 Spoke with pt's son Margarito stated that he would like to talk to HL tomorrow. HL gave informational HOC pamphlet. HL will reach out to Pts son in the am. Pts not being d/c until pt is able to eat, no currently taking PO.    Thank you for this referral,  Please call HOC for questions/concerns at [phone]     Isadora CristinaRosieUlises Wheatley, MSN, RN  Hospital Liaison    [address] [place] [address], [address], [place] [id number]     C: [phone]/ F: [phone]    Main & Referrals: [phone]    [facility]

## 2024-10-21 NOTE — PROGRESS NOTES
[facility] called & spoke with pts Elan Pimentel to discuss hospice philosophy and services.  Discussed covered vs noncovered equipment, services, and medications and patient/family right to request a review of requested/noncovered items. Time spent listening to events of illness, answering questions, and providing emotional support. Pts son Margarito stated he was coming in to [facility] around 1:00 PM to sign Called Admissions Coco at [facility] to see if able to receive pt back to facility later this afternoon.     Thank you for this referral,  Please call HOC for questions/concerns at [phone]     Isadora (Rosie) Corry, MSN, RN  Hospital Liaison    [address] [place] [address], [address], [place] [id number]     C: [phone]/ F: [phone]    Main & Referrals: [phone]    [facility]

## 2024-10-21 NOTE — PROGRESS NOTES
[facility]    10/20/2024 9:25 PM        Name: Jelani Martínez .              Admitted: 10/15/2024  Primary Care Provider: Ignacio Cortez MD (Tel: [phone])      Hospital course: 82 yo male presented from nursing home with altered mental status. He was reported to be talkative that morning then noted to have periods of decreased responsiveness. He was hypotensive on presentation. Admitted with SBO, sepsis and acute renal failure.He was initially admitted to ICU with pressor support, transferred out of unit 10/16.      Subjective:  still lethargic fever last night    Reviewed interval ancillary notes    Current Medications  dextrose 5 % solution, Continuous  sodium chloride flush 0.9 % injection 5-40 mL, 2 times per day  sodium chloride flush 0.9 % injection 5-40 mL, PRN  0.9 % sodium chloride infusion, PRN  lidocaine PF 1 % injection 50 mg, Once  meropenem (MERREM) 1,000 mg in sodium chloride 0.9 % 100 mL IVPB (mini-bag), Q12H  micafungin (MYCAMINE) 100 mg in sodium chloride 0.9 % 100 mL IVPB, Daily  labetalol (NORMODYNE;TRANDATE) injection 5 mg, Q6H  labetalol (NORMODYNE;TRANDATE) injection 5 mg, Q4H PRN  OLANZapine (ZyPREXA) 5 mg in sterile water 1 mL injection, Daily  hydrALAZINE (APRESOLINE) injection 5 mg, Q4H PRN  Polyvinyl Alcohol-Povidone PF (REFRESH) 1.4-0.6 % ophthalmic solution 2 drop, TID  dorzolamide (TRUSOPT) 2 % ophthalmic solution 1 drop, BID   And  timolol (TIMOPTIC) 0.5 % ophthalmic solution 1 drop, BID  phenol 1.4 % mouth spray 1 spray, Q2H PRN  aspirin chewable tablet 81 mg, Daily  atorvastatin (LIPITOR) tablet 80 mg, Nightly  brimonidine (ALPHAGAN) 0.2 % ophthalmic solution 1 drop, BID  latanoprost (XALATAN) 0.005 % ophthalmic solution 1 drop, Nightly  melatonin tablet 9 mg, Nightly PRN  mirtazapine (REMERON) tablet 7.5 mg, Nightly  sodium chloride flush 0.9 % injection 5-40 mL, 2 times

## 2024-10-21 NOTE — PROGRESS NOTES
transcription, some errors in transcription may have occurred inadvertently. If you may need any clarification, please do not hesitate to contact me through EPIC or at the phone number provided below with my electronic signature.  Any pictures or media included in this note were obtained after taking informed verbal consent from the patient and with their approval to include those in the patient's medical record.        Júnior Butler MD, MPH, FACP, FIDSA  10/21/2024, 1:53 PM  Central Office Phone: [phone]  Central Office Fax: [phone]    [facility]   [address], [address] ([address] [place] [address]  [facility] days:  Tuesday & Thursday AM    [facility]  [address] , [address] (Medical office Building)  [place], [place], [address]  [facility] Clinic days: Wednesday AM

## 2024-10-22 NOTE — PROGRESS NOTES
This RN notified by [employer] at  that patients HR was in the 30's. Upon entering the room patient was unresponsive, with no respirations, and a HR of 0. Dr. Higgins was notified and pronounced the patient  at . This RN called the patients son Cole, and notified him of the patients passing. Son informed RN he would contact the  home. Patient transferred to the [facility].

## 2024-10-22 NOTE — DEATH NOTES
Death Pronouncement Note  Patient's Name: Jelani Martínez   Patient's YOB: 1940  MRN Number: [id number]    Admitting Provider: Chris Joshi MD  Attending Provider: Ivette Wilson MD    Patient was examined and the following were absent: Pulses, Blood Pressure, and Respiratory effort    I declared the patient dead on  at 20:39 on 10/21/2024.    Preliminary Cause of Death:     SBO   Pneumonia   Septic shock resolved   Acute metabolic encephalopathy superimposed on chronic dementia  MONA on CKD stage III and hyperkalemia   Hypernatmriea   Hypokalemia    Electronically signed by Gisela Singletary MD on 10/21/24 at 9:43 PM EDT  Patient family informed patient CODE STATUS is DNR CC.  Time spent 31 minutes

## 2024-10-25 LAB
BACTERIA BLD CULT ORG #2: NORMAL
BACTERIA BLD CULT: NORMAL

## 2024-10-31 NOTE — DISCHARGE SUMMARY
Hospital Medicine Discharge Summary    Patient: Jelani Martínez     Gender: male  : 1940   Age: 83 y.o.  MRN: [id number]    Admitting Physician: Chris Joshi MD  Discharge Physician: Ivette Wilson MD     Code Status: Prior     Admit Date: 10/15/2024   Discharge Date: 10/21/2024      Disposition:        Discharge Diagnoses:    Active Hospital Problems    Diagnosis Date Noted    Essential hypertension [I10] 2022     Priority: Medium    Mild malnutrition (HCC) [E44.1] 10/21/2024    SBO (small bowel obstruction) (HCC) [K56.609] 10/18/2024    Polyneuropathy [G62.9] 10/18/2024    Overweight (BMI 25.0-29.9) [E66.3] 10/18/2024    History of depression [Z86.59] 10/18/2024    Mixed hyperlipidemia [E78.2] 10/18/2024    CRP elevated [R79.82] 10/18/2024    Elevated sed rate [R70.0] 10/18/2024    Septic shock (HCC) [A41.9, R65.21] 10/15/2024    Complicated UTI (urinary tract infection) [N39.0] 2024    Severe sepsis (HCC) [A41.9, R65.20] 2024           Hospital Course:   Admitted to the hospital with small bowel obstruction treated with conservative measures including NG tube patient with mild NG tube continue n.p.o. and pneumonia treated with IV antibiotics MONA on CKD 3 treated with IV fluids  Patient was not doing well hospice was consulted and is planning to follow-up with hospice unfortunately patient  on 10/21/2024 at 9:43 PM